# Patient Record
Sex: MALE | Race: WHITE | NOT HISPANIC OR LATINO | URBAN - METROPOLITAN AREA
[De-identification: names, ages, dates, MRNs, and addresses within clinical notes are randomized per-mention and may not be internally consistent; named-entity substitution may affect disease eponyms.]

---

## 2019-06-23 ENCOUNTER — INPATIENT HOSPITAL (OUTPATIENT)
Dept: URBAN - METROPOLITAN AREA HOSPITAL 90 | Facility: HOSPITAL | Age: 69
End: 2019-06-23

## 2019-06-23 DIAGNOSIS — F10.10 ALCOHOL ABUSE, UNCOMPLICATED: ICD-10-CM

## 2019-06-23 DIAGNOSIS — K85.20 ALCOHOL INDUCED ACUTE PANCREATITIS WITHOUT NECROSIS OR INFEC: ICD-10-CM

## 2019-06-23 DIAGNOSIS — R11.2 NAUSEA WITH VOMITING, UNSPECIFIED: ICD-10-CM

## 2019-06-23 DIAGNOSIS — K70.10 ALCOHOLIC HEPATITIS WITHOUT ASCITES: ICD-10-CM

## 2019-06-23 PROCEDURE — 99223 1ST HOSP IP/OBS HIGH 75: CPT

## 2019-06-24 ENCOUNTER — INPATIENT HOSPITAL (OUTPATIENT)
Dept: URBAN - METROPOLITAN AREA HOSPITAL 90 | Facility: HOSPITAL | Age: 69
End: 2019-06-24

## 2019-06-24 DIAGNOSIS — F10.10 ALCOHOL ABUSE, UNCOMPLICATED: ICD-10-CM

## 2019-06-24 DIAGNOSIS — R93.3 ABNORMAL FINDINGS ON DIAGNOSTIC IMAGING OF OTHER PARTS OF DI: ICD-10-CM

## 2019-06-24 DIAGNOSIS — R10.13 EPIGASTRIC PAIN: ICD-10-CM

## 2019-06-24 DIAGNOSIS — K22.70 BARRETT'S ESOPHAGUS WITHOUT DYSPLASIA: ICD-10-CM

## 2019-06-24 DIAGNOSIS — R11.2 NAUSEA WITH VOMITING, UNSPECIFIED: ICD-10-CM

## 2019-06-24 DIAGNOSIS — K31.7 POLYP OF STOMACH AND DUODENUM: ICD-10-CM

## 2019-06-24 DIAGNOSIS — K70.10 ALCOHOLIC HEPATITIS WITHOUT ASCITES: ICD-10-CM

## 2019-06-24 DIAGNOSIS — K85.20 ALCOHOL INDUCED ACUTE PANCREATITIS WITHOUT NECROSIS OR INFEC: ICD-10-CM

## 2019-06-24 PROCEDURE — 43239 EGD BIOPSY SINGLE/MULTIPLE: CPT

## 2019-07-30 ENCOUNTER — OFFICE VISIT (OUTPATIENT)
Dept: FAMILY MEDICINE CLINIC | Facility: CLINIC | Age: 69
End: 2019-07-30

## 2019-07-30 VITALS
DIASTOLIC BLOOD PRESSURE: 83 MMHG | SYSTOLIC BLOOD PRESSURE: 128 MMHG | HEIGHT: 61 IN | BODY MASS INDEX: 46.07 KG/M2 | OXYGEN SATURATION: 95 % | WEIGHT: 244 LBS | HEART RATE: 72 BPM | TEMPERATURE: 98.3 F

## 2019-07-30 DIAGNOSIS — I10 ESSENTIAL HYPERTENSION: ICD-10-CM

## 2019-07-30 DIAGNOSIS — R79.89 ELEVATED LIVER FUNCTION TESTS: ICD-10-CM

## 2019-07-30 DIAGNOSIS — N40.1 BENIGN PROSTATIC HYPERPLASIA WITH URINARY FREQUENCY: ICD-10-CM

## 2019-07-30 DIAGNOSIS — K22.70 BARRETT'S ESOPHAGUS WITHOUT DYSPLASIA: Primary | ICD-10-CM

## 2019-07-30 DIAGNOSIS — R79.9 ABNORMAL FINDING OF BLOOD CHEMISTRY: ICD-10-CM

## 2019-07-30 DIAGNOSIS — R35.0 BENIGN PROSTATIC HYPERPLASIA WITH URINARY FREQUENCY: ICD-10-CM

## 2019-07-30 DIAGNOSIS — F51.01 PRIMARY INSOMNIA: ICD-10-CM

## 2019-07-30 DIAGNOSIS — Z11.59 ENCOUNTER FOR SCREENING FOR OTHER VIRAL DISEASES: ICD-10-CM

## 2019-07-30 DIAGNOSIS — Z28.39 IMMUNIZATION DEFICIENCY: ICD-10-CM

## 2019-07-30 PROBLEM — M17.0 OSTEOARTHRITIS OF BOTH KNEES: Status: ACTIVE | Noted: 2019-07-30

## 2019-07-30 PROBLEM — R11.2 NON-INTRACTABLE VOMITING WITH NAUSEA: Status: ACTIVE | Noted: 2019-07-30

## 2019-07-30 PROBLEM — J43.8 OTHER EMPHYSEMA: Status: ACTIVE | Noted: 2019-07-30

## 2019-07-30 PROBLEM — F33.41 RECURRENT MAJOR DEPRESSIVE DISORDER, IN PARTIAL REMISSION (HCC): Status: ACTIVE | Noted: 2019-07-30

## 2019-07-30 PROBLEM — J30.1 SEASONAL ALLERGIC RHINITIS DUE TO POLLEN: Status: ACTIVE | Noted: 2019-07-30

## 2019-07-30 PROBLEM — K29.50 ANTRAL GASTRITIS: Status: ACTIVE | Noted: 2019-07-30

## 2019-07-30 PROBLEM — M1A.09X0 CHRONIC GOUT OF MULTIPLE SITES: Status: ACTIVE | Noted: 2019-07-30

## 2019-07-30 PROCEDURE — G0009 ADMIN PNEUMOCOCCAL VACCINE: HCPCS | Performed by: FAMILY MEDICINE

## 2019-07-30 PROCEDURE — 99214 OFFICE O/P EST MOD 30 MIN: CPT | Performed by: FAMILY MEDICINE

## 2019-07-30 PROCEDURE — 90732 PPSV23 VACC 2 YRS+ SUBQ/IM: CPT | Performed by: FAMILY MEDICINE

## 2019-07-30 RX ORDER — AMLODIPINE BESYLATE AND BENAZEPRIL HYDROCHLORIDE 5; 40 MG/1; MG/1
1 CAPSULE ORAL DAILY
Refills: 3 | COMMUNITY
Start: 2019-06-27 | End: 2019-11-24 | Stop reason: SDUPTHER

## 2019-07-30 RX ORDER — CLONAZEPAM 0.5 MG/1
0.5 TABLET ORAL DAILY
COMMUNITY
End: 2020-03-03 | Stop reason: SDUPTHER

## 2019-07-30 RX ORDER — PANTOPRAZOLE SODIUM 40 MG/1
40 TABLET, DELAYED RELEASE ORAL 2 TIMES DAILY
Refills: 11 | COMMUNITY
Start: 2019-06-20 | End: 2020-02-19

## 2019-07-30 RX ORDER — FLUTICASONE PROPIONATE 50 MCG
1 SPRAY, SUSPENSION (ML) NASAL DAILY
COMMUNITY
Start: 2019-05-12 | End: 2020-09-03 | Stop reason: SDUPTHER

## 2019-07-30 RX ORDER — AZELASTINE 1 MG/ML
2 SPRAY, METERED NASAL 2 TIMES DAILY
COMMUNITY
End: 2020-09-03

## 2019-07-30 RX ORDER — METOPROLOL TARTRATE 50 MG/1
1 TABLET, FILM COATED ORAL 2 TIMES DAILY
COMMUNITY
Start: 2019-07-27 | End: 2019-10-23 | Stop reason: SDUPTHER

## 2019-07-30 NOTE — PROGRESS NOTES
Chief Complaint   Patient presents with   • Anxiety   • Hypertension   • Heartburn       Subjective   Álvaro Jacques is a 69 y.o. male.     Pt had episode about a month ago where he was unable to keep anything down, vomiting but no diarrhea. Was admitted to [place]       Anxiety   Patient reports no chest pain, confusion, depressed mood or shortness of breath.       Hypertension   Associated symptoms include anxiety. Pertinent negatives include no blurred vision, chest pain or shortness of breath.   Heartburn   He reports no abdominal pain, no chest pain, no sore throat or no wheezing. Pertinent negatives include no fatigue.       F/U ALLA with Coronel's.  Doing well with meds.  No SE.   F/U HTN.  No orthostasis.  On meds regularly.    F/U nausea for 3 days.  Admitted to [facility] 6/22/19.  No real answer for problem.  Still with occ yellowing of eyes.   I am drinking about 2 drinks 3 times a week or so.      The following portions of the patient's history were reviewed and updated as appropriate: allergies, current medications, past family history, past medical history, past social history, past surgical history and problem list.    Review of Systems   Constitutional: Negative for appetite change and fatigue.   HENT: Negative for nosebleeds and sore throat.    Eyes: Negative for blurred vision and visual disturbance.   Respiratory: Negative for shortness of breath and wheezing.    Cardiovascular: Negative for chest pain and leg swelling.   Gastrointestinal: Negative for abdominal distention and abdominal pain.   Endocrine: Negative for cold intolerance and polyuria.   Genitourinary: Negative for dysuria and hematuria.   Musculoskeletal: Negative for arthralgias and myalgias.   Skin: Negative for color change and rash.   Neurological: Negative for weakness and confusion.   Psychiatric/Behavioral: Negative for agitation and depressed mood.       Patient Active Problem List   Diagnosis   • Seasonal allergic rhinitis  due to pollen   • Antral gastritis   • Coronel's esophagus without dysplasia   • Recurrent major depressive disorder, in partial remission (CMS/HCC)   • Elevated liver function tests   • Other emphysema (CMS/HCC)   • Essential hypertension   • Chronic gout of multiple sites   • Osteoarthritis of both knees   • Primary insomnia   • Benign prostatic hyperplasia with urinary frequency   • Immunization deficiency   • Non-intractable vomiting with nausea       No Known Allergies      Current Outpatient Medications:   •  amLODIPine-benazepril (LOTREL) 5-40 MG per capsule, Take 1 capsule by mouth Daily., Disp: , Rfl: 3  •  azelastine (ASTELIN) 0.1 % nasal spray, 2 sprays into the nostril(s) as directed by provider 2 (Two) Times a Day. Use in each nostril as directed, Disp: , Rfl:   •  clonazePAM (KlonoPIN) 0.5 MG tablet, Take 0.5 mg by mouth Daily., Disp: , Rfl:   •  fluticasone (FLONASE) 50 MCG/ACT nasal spray, 1 spray into the nostril(s) as directed by provider Daily., Disp: , Rfl:   •  metoprolol tartrate (LOPRESSOR) 50 MG tablet, Take 1 tablet by mouth 2 (Two) Times a Day., Disp: , Rfl:   •  pantoprazole (PROTONIX) 40 MG EC tablet, Take 40 mg by mouth 2 (Two) Times a Day., Disp: , Rfl: 11    Past Medical History:   Diagnosis Date   • Allergic rhinitis    • Anxiety    • BPH (benign prostatic hyperplasia)    • Emphysema lung (CMS/HCC)    • GERD (gastroesophageal reflux disease)    • Gout    • Hypertension    • Insomnia    • Osteoporosis        Past Surgical History:   Procedure Laterality Date   • COLONOSCOPY         History reviewed. No pertinent family history.    Social History     Tobacco Use   • Smoking status: Former Smoker     Types: Cigarettes, Cigars     Last attempt to quit: 1970     Years since quittin.0   • Smokeless tobacco: Never Used   • Tobacco comment: still smokes cigars occasionally    Substance Use Topics   • Alcohol use: Yes            Objective     Vitals:    19 1340   BP: 128/83    Pulse: 72   Temp: 98.3 °F (36.8 °C)   SpO2: 95%     Body mass index is 46.1 kg/m².    Physical Exam   Constitutional: He is oriented to person, place, and time. He appears well-developed and well-nourished.   HENT:   Head: Normocephalic and atraumatic.   Right Ear: External ear normal.   Left Ear: External ear normal.   Nose: Nose normal.   Mouth/Throat: Oropharynx is clear and moist.   Eyes: EOM are normal. Pupils are equal, round, and reactive to light.   Questionable sl icteric in conjunctiva   Neck: Normal range of motion. Neck supple. No tracheal deviation present. No thyromegaly present.   Cardiovascular: Normal rate, regular rhythm and normal heart sounds. Exam reveals no gallop and no friction rub.   No murmur heard.  Pulmonary/Chest: Effort normal and breath sounds normal. No respiratory distress. He exhibits no tenderness.   Abdominal: Soft. Bowel sounds are normal. He exhibits no distension. There is no tenderness.   Musculoskeletal: Normal range of motion. He exhibits no edema or tenderness.   Lymphadenopathy:     He has no cervical adenopathy.   Neurological: He is alert and oriented to person, place, and time. He displays normal reflexes. No cranial nerve deficit or sensory deficit. Coordination normal.   Skin: Skin is warm and dry.   Psychiatric: He has a normal mood and affect. His behavior is normal. Judgment and thought content normal.   Nursing note and vitals reviewed.      No results found for: GLUCOSE, BUN, CREATININE, EGFRIFNONA, EGFRIFAFRI, BCR, K, CO2, CALCIUM, PROTENTOTREF, ALBUMIN, LABIL2, AST, ALT    No results found for: WBC, RBC, HGB, HCT, MCV, MCH, MCHC, RDW, RDWSD, MPV, PLT, NEUTRORELPCT, LYMPHORELPCT, MONORELPCT, EOSRELPCT, BASORELPCT, AUTOIGPER, NEUTROABS, LYMPHSABS, MONOSABS, EOSABS, BASOSABS, AUTOIGNUM, NRBC    No results found for: HGBA1C    No results found for: [id number]    No results found for: TSH    No results found for: CHOL  No results found for: TRIG  No results found  for: HDL  No results found for: LDL  No results found for: VLDL  No results found for: LDLHDL      Procedures    Assessment/Plan   Problems Addressed this Visit        Cardiovascular and Mediastinum    Essential hypertension    Relevant Orders    Lipid Panel With / Chol / HDL Ratio    TSH Rfx On Abnormal To Free T4       Digestive    Coronel's esophagus without dysplasia - Primary       Genitourinary    Benign prostatic hyperplasia with urinary frequency    Relevant Orders    PSA DIAGNOSTIC       Other    Elevated liver function tests    Relevant Orders    CBC & Differential    Comprehensive Metabolic Panel    Amylase    Lipase    Lipid Panel With / Chol / HDL Ratio    Hepatitis C Virus Ab Cascade    Hepatitis B Surface Antigen    Hepatitis B Surface Antibody    Ferritin    Iron    TSH Rfx On Abnormal To Free T4    ALEXA by IFA, Reflex 9-biomarkers profile    Ceruloplasmin    Hepatitis A Antibody, IgM    Primary insomnia    Immunization deficiency      Other Visit Diagnoses     Encounter for screening for other viral diseases         Relevant Orders    Hepatitis B Surface Antigen    Hepatitis B Surface Antibody    Abnormal finding of blood chemistry         Relevant Orders    Ferritin    Iron          Orders Placed This Encounter   Procedures   • Pneumococcal Polysaccharide Vaccine 23-Valent Greater Than or Equal To 1yo Subcutaneous / IM   • Comprehensive Metabolic Panel   • Amylase   • Lipase   • Lipid Panel With / Chol / HDL Ratio   • PSA DIAGNOSTIC   • Hepatitis C Virus Ab Cascade   • Hepatitis B Surface Antigen   • Hepatitis B Surface Antibody   • Ferritin   • Iron   • TSH Rfx On Abnormal To Free T4   • ALEXA by IFA, Reflex 9-biomarkers profile   • Ceruloplasmin   • Hepatitis A Antibody, IgM   • CBC & Differential     Order Specific Question:   Manual Differential     Answer:   No       Current Outpatient Medications   Medication Sig Dispense Refill   • amLODIPine-benazepril (LOTREL) 5-40 MG per capsule Take 1  capsule by mouth Daily.  3   • azelastine (ASTELIN) 0.1 % nasal spray 2 sprays into the nostril(s) as directed by provider 2 (Two) Times a Day. Use in each nostril as directed     • clonazePAM (KlonoPIN) 0.5 MG tablet Take 0.5 mg by mouth Daily.     • fluticasone (FLONASE) 50 MCG/ACT nasal spray 1 spray into the nostril(s) as directed by provider Daily.     • metoprolol tartrate (LOPRESSOR) 50 MG tablet Take 1 tablet by mouth 2 (Two) Times a Day.     • pantoprazole (PROTONIX) 40 MG EC tablet Take 40 mg by mouth 2 (Two) Times a Day.  11     No current facility-administered medications for this visit.        Álvaro Jacques had no medications administered during this visit.    No Follow-up on file.    There are no Patient Instructions on file for this visit.

## 2019-07-31 LAB
ALBUMIN SERPL-MCNC: 4.7 G/DL (ref 3.5–5.2)
ALBUMIN/GLOB SERPL: 1.7 G/DL
ALP SERPL-CCNC: 62 U/L (ref 39–117)
ALT SERPL-CCNC: 32 U/L (ref 1–41)
AMYLASE SERPL-CCNC: 113 U/L (ref 28–100)
ANA TITR SER IF: NEGATIVE {TITER}
AST SERPL-CCNC: 37 U/L (ref 1–40)
BASOPHILS # BLD AUTO: 0.07 10*3/MM3 (ref 0–0.2)
BASOPHILS NFR BLD AUTO: 1.2 % (ref 0–1.5)
BILIRUB SERPL-MCNC: 2 MG/DL (ref 0.2–1.2)
BUN SERPL-MCNC: 7 MG/DL (ref 8–23)
BUN/CREAT SERPL: 7.7 (ref 7–25)
CALCIUM SERPL-MCNC: 9.6 MG/DL (ref 8.6–10.5)
CERULOPLASMIN SERPL-MCNC: 20.7 MG/DL (ref 16–31)
CHLORIDE SERPL-SCNC: 99 MMOL/L (ref 98–107)
CHOLEST SERPL-MCNC: 149 MG/DL (ref 0–200)
CHOLEST/HDLC SERPL: 1.54 {RATIO}
CO2 SERPL-SCNC: 25.7 MMOL/L (ref 22–29)
CREAT SERPL-MCNC: 0.91 MG/DL (ref 0.76–1.27)
EOSINOPHIL # BLD AUTO: 0.08 10*3/MM3 (ref 0–0.4)
EOSINOPHIL NFR BLD AUTO: 1.4 % (ref 0.3–6.2)
ERYTHROCYTE [DISTWIDTH] IN BLOOD BY AUTOMATED COUNT: 15.2 % (ref 12.3–15.4)
FERRITIN SERPL-MCNC: 218 NG/ML (ref 30–400)
GLOBULIN SER CALC-MCNC: 2.8 GM/DL
GLUCOSE SERPL-MCNC: 94 MG/DL (ref 65–99)
HAV IGM SERPL QL IA: NEGATIVE
HBV SURFACE AB SER QL: NON REACTIVE
HBV SURFACE AG SERPL QL IA: NEGATIVE
HCT VFR BLD AUTO: 47.5 % (ref 37.5–51)
HCV AB S/CO SERPL IA: <0.1 S/CO RATIO (ref 0–0.9)
HCV AB SERPL QL IA: NORMAL
HDLC SERPL-MCNC: 97 MG/DL (ref 40–60)
HGB BLD-MCNC: 16 G/DL (ref 13–17.7)
IMM GRANULOCYTES # BLD AUTO: 0.02 10*3/MM3 (ref 0–0.05)
IMM GRANULOCYTES NFR BLD AUTO: 0.3 % (ref 0–0.5)
IRON SERPL-MCNC: 181 MCG/DL (ref 59–158)
LABORATORY COMMENT REPORT: NORMAL
LDLC SERPL CALC-MCNC: 37 MG/DL (ref 0–100)
LIPASE SERPL-CCNC: 45 U/L (ref 13–60)
LYMPHOCYTES # BLD AUTO: 0.92 10*3/MM3 (ref 0.7–3.1)
LYMPHOCYTES NFR BLD AUTO: 15.8 % (ref 19.6–45.3)
MCH RBC QN AUTO: 30.7 PG (ref 26.6–33)
MCHC RBC AUTO-ENTMCNC: 33.7 G/DL (ref 31.5–35.7)
MCV RBC AUTO: 91 FL (ref 79–97)
MONOCYTES # BLD AUTO: 0.43 10*3/MM3 (ref 0.1–0.9)
MONOCYTES NFR BLD AUTO: 7.4 % (ref 5–12)
NEUTROPHILS # BLD AUTO: 4.32 10*3/MM3 (ref 1.7–7)
NEUTROPHILS NFR BLD AUTO: 73.9 % (ref 42.7–76)
NRBC BLD AUTO-RTO: 0 /100 WBC (ref 0–0.2)
PLATELET # BLD AUTO: 198 10*3/MM3 (ref 140–450)
POTASSIUM SERPL-SCNC: 3.9 MMOL/L (ref 3.5–5.2)
PROT SERPL-MCNC: 7.5 G/DL (ref 6–8.5)
PSA SERPL-MCNC: 3.78 NG/ML (ref 0–4)
RBC # BLD AUTO: 5.22 10*6/MM3 (ref 4.14–5.8)
SODIUM SERPL-SCNC: 138 MMOL/L (ref 136–145)
TRIGL SERPL-MCNC: 74 MG/DL (ref 0–150)
TSH SERPL DL<=0.005 MIU/L-ACNC: 2.85 MIU/ML (ref 0.27–4.2)
VLDLC SERPL CALC-MCNC: 14.8 MG/DL
WBC # BLD AUTO: 5.84 10*3/MM3 (ref 3.4–10.8)

## 2019-10-23 RX ORDER — METOPROLOL TARTRATE 50 MG/1
TABLET, FILM COATED ORAL
Qty: 180 TABLET | Refills: 0 | Status: SHIPPED | OUTPATIENT
Start: 2019-10-23 | End: 2020-02-20 | Stop reason: SDUPTHER

## 2019-10-31 ENCOUNTER — OFFICE VISIT (OUTPATIENT)
Dept: FAMILY MEDICINE CLINIC | Facility: CLINIC | Age: 69
End: 2019-10-31

## 2019-10-31 VITALS
TEMPERATURE: 98.5 F | BODY MASS INDEX: 47.58 KG/M2 | DIASTOLIC BLOOD PRESSURE: 70 MMHG | WEIGHT: 252 LBS | HEIGHT: 61 IN | OXYGEN SATURATION: 95 % | SYSTOLIC BLOOD PRESSURE: 128 MMHG | HEART RATE: 74 BPM

## 2019-10-31 DIAGNOSIS — F41.1 GAD (GENERALIZED ANXIETY DISORDER): ICD-10-CM

## 2019-10-31 DIAGNOSIS — R74.8 HYPERAMYLASEMIA: ICD-10-CM

## 2019-10-31 DIAGNOSIS — I10 ESSENTIAL HYPERTENSION: ICD-10-CM

## 2019-10-31 DIAGNOSIS — Z00.00 MEDICARE ANNUAL WELLNESS VISIT, SUBSEQUENT: Primary | ICD-10-CM

## 2019-10-31 PROCEDURE — 99214 OFFICE O/P EST MOD 30 MIN: CPT | Performed by: FAMILY MEDICINE

## 2019-10-31 PROCEDURE — G0439 PPPS, SUBSEQ VISIT: HCPCS | Performed by: FAMILY MEDICINE

## 2019-10-31 NOTE — PROGRESS NOTES
The ABCs of the Annual Wellness Visit  Subsequent Medicare Wellness Visit    Chief Complaint   Patient presents with   • Hypertension   • Anxiety   • Heartburn       Subjective   History of Present Illness:  Álvaro Jacques is a 69 y.o. male who presents for a Subsequent Medicare Wellness Visit.  F/U elevated amylase.  Amylase 113 from 3 months ago.  Drinking about 2 ounce of bourbon 5 times a week.  No nausea.  Doing well now.  Lipase normal.    FU HTN.  No orthostasis.  Doing well with meds.  I am exercising regularly now.     FU ELEAZAR.  Doing well with clonazepam prn and rarely given.    HEALTH RISK ASSESSMENT    Recent Hospitalizations:  Recently treated at the following:  Other: [facility]    Current Medical Providers:  Patient Care Team:  Corey Martínez MD as PCP - General (Family Medicine)    Smoking Status:  Social History     Tobacco Use   Smoking Status Former Smoker   • Types: Cigarettes, Cigars   • Last attempt to quit: 1970   • Years since quittin.3   Smokeless Tobacco Never Used   Tobacco Comment    still smokes cigars occasionally        Alcohol Consumption:  Social History     Substance and Sexual Activity   Alcohol Use Yes       Depression Screen:   PHQ-2/PHQ-9 Depression Screening 2019   Little interest or pleasure in doing things 0   Feeling down, depressed, or hopeless 0   Total Score 0       Fall Risk Screen:  STEADI Fall Risk Assessment has not been completed.    Health Habits and Functional and Cognitive Screening:  Functional & Cognitive Status 10/31/2019   Do you have difficulty preparing food and eating? No   Do you have difficulty bathing yourself, getting dressed or grooming yourself? No   Do you have difficulty using the toilet? No   Do you have difficulty moving around from place to place? No   Do you have trouble with steps or getting out of a bed or a chair? No   Current Diet Well Balanced Diet   Dental Exam Up to date   Eye Exam Not up to date   Exercise (times per  week) 4 times per week   Current Exercise Activities Include Walking   Do you need help using the phone?  No   Are you deaf or do you have serious difficulty hearing?  No   Do you need help with transportation? No   Do you need help shopping? No   Do you need help preparing meals?  No   Do you need help with housework?  No   Do you need help with laundry? No   Do you need help taking your medications? No   Do you need help managing money? No   Do you ever drive or ride in a car without wearing a seat belt? No   Have you felt unusual stress, anger or loneliness in the last month? No   Who do you live with? Spouse   If you need help, do you have trouble finding someone available to you? No   Have you been bothered in the last four weeks by sexual problems? No   Do you have difficulty concentrating, remembering or making decisions? No         Does the patient have evidence of cognitive impairment? No    Asprin use counseling:Does not need ASA (and currently is not on it)    Age-appropriate Screening Schedule:  Refer to the list below for future screening recommendations based on patient's age, sex and/or medical conditions. Orders for these recommended tests are listed in the plan section. The patient has been provided with a written plan.    Health Maintenance   Topic Date Due   • TDAP/TD VACCINES (1 - Tdap) 05/30/1971   • ZOSTER VACCINE (1 of 2) 05/30/2002   • DXA SCAN  07/30/2019   • COLONOSCOPY  06/24/2020   • INFLUENZA VACCINE  Completed   • PNEUMOCOCCAL VACCINES (65+ LOW/MEDIUM RISK)  Completed          The following portions of the patient's history were reviewed and updated as appropriate: allergies, current medications, past family history, past medical history, past social history, past surgical history and problem list.    Outpatient Medications Prior to Visit   Medication Sig Dispense Refill   • amLODIPine-benazepril (LOTREL) 5-40 MG per capsule Take 1 capsule by mouth Daily.  3   • azelastine (ASTELIN) 0.1  % nasal spray 2 sprays into the nostril(s) as directed by provider 2 (Two) Times a Day. Use in each nostril as directed     • clonazePAM (KlonoPIN) 0.5 MG tablet Take 0.5 mg by mouth Daily.     • fluticasone (FLONASE) 50 MCG/ACT nasal spray 1 spray into the nostril(s) as directed by provider Daily.     • metoprolol tartrate (LOPRESSOR) 50 MG tablet TAKE 1 TABLET BY MOUTH TWICE A  tablet 0   • pantoprazole (PROTONIX) 40 MG EC tablet Take 40 mg by mouth 2 (Two) Times a Day.  11     No facility-administered medications prior to visit.        Patient Active Problem List   Diagnosis   • Seasonal allergic rhinitis due to pollen   • Antral gastritis   • Coronel's esophagus without dysplasia   • Recurrent major depressive disorder, in partial remission (CMS/HCC)   • Elevated liver function tests   • Other emphysema (CMS/HCC)   • Essential hypertension   • Chronic gout of multiple sites   • Osteoarthritis of both knees   • Primary insomnia   • Benign prostatic hyperplasia with urinary frequency   • Immunization deficiency   • Non-intractable vomiting with nausea   • Medicare annual wellness visit, subsequent   • Hyperamylasemia   • ELEAZAR (generalized anxiety disorder)       Advanced Care Planning:  Patient does not have an advance directive - information provided to the patient today    Review of Systems   Constitutional: Negative for activity change, appetite change and fatigue.   HENT: Negative for hearing loss and postnasal drip.    Eyes: Negative for discharge and itching.   Respiratory: Negative for cough and shortness of breath.    Cardiovascular: Negative for chest pain and leg swelling.   Gastrointestinal: Negative for abdominal distention and abdominal pain.   Endocrine: Negative for cold intolerance and heat intolerance.   Genitourinary: Negative for difficulty urinating and flank pain.   Musculoskeletal: Negative for arthralgias and myalgias.   Skin: Negative for color change.   Neurological: Negative for  "dizziness and facial asymmetry.   Hematological: Negative for adenopathy.   Psychiatric/Behavioral: Negative for agitation and confusion.       Compared to one year ago, the patient feels his physical health is the same.  Compared to one year ago, the patient feels his mental health is the same.    Reviewed chart for potential of high risk medication in the elderly: yes  Reviewed chart for potential of harmful drug interactions in the elderly:yes    Objective         Vitals:    10/31/19 0851 10/31/19 0920   BP: 156/98 128/70   Pulse: 74    Temp: 98.5 °F (36.9 °C)    SpO2: 95%    Weight: 114 kg (252 lb)    Height: 154.9 cm (61\")        Body mass index is 47.61 kg/m².  Discussed the patient's BMI with him. The BMI is above average; BMI management plan is completed.    Physical Exam   Constitutional: He appears well-developed and well-nourished.   HENT:   Head: Normocephalic and atraumatic.   Right Ear: External ear normal.   Left Ear: External ear normal.   Nose: Nose normal.   Mouth/Throat: Oropharynx is clear and moist.   Eyes: Conjunctivae and EOM are normal. Pupils are equal, round, and reactive to light. Right eye exhibits no discharge. Left eye exhibits no discharge. No scleral icterus.   Neck: Normal range of motion. Neck supple. No JVD present. No tracheal deviation present. No thyromegaly present.   Cardiovascular: Normal rate, regular rhythm, normal heart sounds and intact distal pulses. Exam reveals no gallop and no friction rub.   No murmur heard.  Pulmonary/Chest: Effort normal and breath sounds normal. No respiratory distress. He has no wheezes. He has no rales. He exhibits no tenderness.   Abdominal: Soft. Bowel sounds are normal. He exhibits no distension and no mass. There is no tenderness. There is no rebound and no guarding. No hernia.   Musculoskeletal: Normal range of motion. He exhibits no edema or tenderness.   Lymphadenopathy:     He has no cervical adenopathy.   Neurological: He displays " normal reflexes. No cranial nerve deficit or sensory deficit. He exhibits normal muscle tone. Coordination normal.   Skin: Skin is warm and dry.   Psychiatric: He has a normal mood and affect. His behavior is normal. Judgment and thought content normal.   Nursing note and vitals reviewed.            Assessment/Plan   Medicare Risks and Personalized Health Plan  CMS Preventative Services Quick Reference  Inactivity/Sedentary    The above risks/problems have been discussed with the patient.  Pertinent information has been shared with the patient in the After Visit Summary.  Follow up plans and orders are seen below in the Assessment/Plan Section.    Diagnoses and all orders for this visit:    1. Medicare annual wellness visit, subsequent (Primary)    2. Essential hypertension    3. Hyperamylasemia  -     Amylase    4. ELEAZAR (generalized anxiety disorder)    Continue BP meds.  Continue clonazepam for anxiety.    Follow Up:  Return in about 4 months (around 2/29/2020).     An After Visit Summary and PPPS were given to the patient.     Get into seeing optometry.

## 2019-11-01 LAB — AMYLASE SERPL-CCNC: 71 U/L (ref 28–100)

## 2019-11-25 RX ORDER — AMLODIPINE BESYLATE AND BENAZEPRIL HYDROCHLORIDE 5; 40 MG/1; MG/1
CAPSULE ORAL
Qty: 30 CAPSULE | Refills: 5 | Status: SHIPPED | OUTPATIENT
Start: 2019-11-25 | End: 2020-03-03 | Stop reason: SDUPTHER

## 2020-02-19 RX ORDER — PANTOPRAZOLE SODIUM 40 MG/1
TABLET, DELAYED RELEASE ORAL
Qty: 180 TABLET | Refills: 3 | Status: SHIPPED | OUTPATIENT
Start: 2020-02-19 | End: 2021-01-05 | Stop reason: SDUPTHER

## 2020-02-20 ENCOUNTER — TELEPHONE (OUTPATIENT)
Dept: FAMILY MEDICINE CLINIC | Facility: CLINIC | Age: 70
End: 2020-02-20

## 2020-02-20 RX ORDER — METOPROLOL TARTRATE 50 MG/1
50 TABLET, FILM COATED ORAL 2 TIMES DAILY
Qty: 180 TABLET | Refills: 1 | Status: SHIPPED | OUTPATIENT
Start: 2020-02-20 | End: 2020-08-18

## 2020-02-20 NOTE — TELEPHONE ENCOUNTER
Pt is requesting a refill on the following medication,   metoprolol tartrate (LOPRESSOR) 50 MG tablet        Sig: TAKE 1 TABLET BY MOUTH TWICE A DAY

## 2020-03-03 ENCOUNTER — OFFICE VISIT (OUTPATIENT)
Dept: FAMILY MEDICINE CLINIC | Facility: CLINIC | Age: 70
End: 2020-03-03

## 2020-03-03 VITALS
WEIGHT: 252 LBS | BODY MASS INDEX: 47.58 KG/M2 | OXYGEN SATURATION: 95 % | SYSTOLIC BLOOD PRESSURE: 167 MMHG | TEMPERATURE: 98.2 F | DIASTOLIC BLOOD PRESSURE: 105 MMHG | HEIGHT: 61 IN | HEART RATE: 79 BPM

## 2020-03-03 DIAGNOSIS — R74.8 HYPERAMYLASEMIA: ICD-10-CM

## 2020-03-03 DIAGNOSIS — I10 ESSENTIAL HYPERTENSION: ICD-10-CM

## 2020-03-03 DIAGNOSIS — R79.89 ELEVATED LIVER FUNCTION TESTS: ICD-10-CM

## 2020-03-03 DIAGNOSIS — F41.1 GAD (GENERALIZED ANXIETY DISORDER): Primary | ICD-10-CM

## 2020-03-03 DIAGNOSIS — K22.70 BARRETT'S ESOPHAGUS WITHOUT DYSPLASIA: ICD-10-CM

## 2020-03-03 PROCEDURE — 99214 OFFICE O/P EST MOD 30 MIN: CPT | Performed by: FAMILY MEDICINE

## 2020-03-03 RX ORDER — AMLODIPINE BESYLATE AND BENAZEPRIL HYDROCHLORIDE 5; 40 MG/1; MG/1
1 CAPSULE ORAL DAILY
Qty: 90 CAPSULE | Refills: 3 | Status: SHIPPED | OUTPATIENT
Start: 2020-03-03 | End: 2020-08-25 | Stop reason: SDUPTHER

## 2020-03-03 RX ORDER — CLONAZEPAM 0.5 MG/1
TABLET ORAL
Qty: 15 TABLET | Refills: 3 | Status: SHIPPED | OUTPATIENT
Start: 2020-03-03 | End: 2021-10-19

## 2020-03-03 NOTE — PROGRESS NOTES
Chief Complaint   Patient presents with   • Hypertension   • Heartburn   • Anxiety       Subjective   Álvaro Jacques is a 69 y.o. male.     History of Present Illness   F/u HTN.  I have been without my BP meds for 3 weeks.    F/U ALLA with Coronel's esophagus.  I do well with BID PPI.  It is doing well.   C.o trouble with anxiety.  I use clonazepam prn only.  It does well for me for a few days at a time.  ANABELL appropriate with no refills seen.   C/o trouble with R knee pain.  Increased trouble with mobility.  Going to have surgery on R knee in a few weeks.     FU elevated liver tests.  I drink 4 to 6 ounces of bourbon a night.   About 4 times a week.    The following portions of the patient's history were reviewed and updated as appropriate: allergies, current medications, past family history, past medical history, past social history, past surgical history and problem list.    Review of Systems   Constitutional: Negative for appetite change and fatigue.   HENT: Negative for nosebleeds and sore throat.    Eyes: Negative for blurred vision and visual disturbance.   Respiratory: Negative for shortness of breath and wheezing.    Cardiovascular: Negative for chest pain and leg swelling.   Gastrointestinal: Negative for abdominal distention and abdominal pain.   Endocrine: Negative for cold intolerance and polyuria.   Genitourinary: Negative for dysuria and hematuria.   Musculoskeletal: Negative for arthralgias and myalgias.   Skin: Negative for color change and rash.   Neurological: Negative for weakness and confusion.   Psychiatric/Behavioral: Negative for agitation and depressed mood.       Patient Active Problem List   Diagnosis   • Seasonal allergic rhinitis due to pollen   • Antral gastritis   • Coronel's esophagus without dysplasia   • Recurrent major depressive disorder, in partial remission (CMS/HCC)   • Elevated liver function tests   • Other emphysema (CMS/HCC)   • Essential hypertension   • Chronic gout of  multiple sites   • Osteoarthritis of both knees   • Primary insomnia   • Benign prostatic hyperplasia with urinary frequency   • Immunization deficiency   • Non-intractable vomiting with nausea   • Medicare annual wellness visit, subsequent   • Hyperamylasemia   • ELEAZAR (generalized anxiety disorder)       No Known Allergies      Current Outpatient Medications:   •  amLODIPine-benazepril (LOTREL) 5-40 MG per capsule, Take 1 capsule by mouth Daily., Disp: 90 capsule, Rfl: 3  •  azelastine (ASTELIN) 0.1 % nasal spray, 2 sprays into the nostril(s) as directed by provider 2 (Two) Times a Day. Use in each nostril as directed, Disp: , Rfl:   •  clonazePAM (KlonoPIN) 0.5 MG tablet, One a day prn anxiety.  Not to exceed 15 per month., Disp: 15 tablet, Rfl: 3  •  fluticasone (FLONASE) 50 MCG/ACT nasal spray, 1 spray into the nostril(s) as directed by provider Daily., Disp: , Rfl:   •  metoprolol tartrate (LOPRESSOR) 50 MG tablet, Take 1 tablet by mouth 2 (Two) Times a Day., Disp: 180 tablet, Rfl: 1  •  pantoprazole (PROTONIX) 40 MG EC tablet, TAKE 1 TABLET TWICE A DAY, Disp: 180 tablet, Rfl: 3    Past Medical History:   Diagnosis Date   • Allergic rhinitis    • Antral gastritis    • Anxiety    • Coronel's esophagus    • Benign enlargement of prostate    • BPH (benign prostatic hyperplasia)    • Calcaneal spur    • Depression with anxiety    • Elevated liver function tests    • Emphysema lung (CMS/HCC)    • Flu vaccine need    • GERD (gastroesophageal reflux disease)    • Gout    • H/O echocardiogram    • History of allergy    • Hypertension    • Inability to attain erection    • Influenza    • Insomnia    • Knee osteoarthritis    • Medicare annual wellness visit, initial    • Nausea and vomiting    • Osteoarthritis    • Osteoporosis    • Primary insomnia    • Urinary frequency        Past Surgical History:   Procedure Laterality Date   • COLONOSCOPY  2010       Family History   Family history unknown: Yes       Social History      Tobacco Use   • Smoking status: Former Smoker     Types: Cigarettes, Cigars     Last attempt to quit: 1970     Years since quittin.6   • Smokeless tobacco: Never Used   • Tobacco comment: still smokes cigars occasionally    Substance Use Topics   • Alcohol use: Yes            Objective     Vitals:    20 0905   BP: (!) 167/105   Pulse: 79   Temp: 98.2 °F (36.8 °C)   SpO2: 95%     Body mass index is 47.61 kg/m².    Physical Exam   Constitutional: He appears well-developed and well-nourished.   HENT:   Head: Normocephalic and atraumatic.   Mouth/Throat: Oropharynx is clear and moist.   Eyes: Pupils are equal, round, and reactive to light. No scleral icterus.   Neck: No thyromegaly present.   Cardiovascular: Normal rate and regular rhythm. Exam reveals no gallop and no friction rub.   No murmur heard.  Pulmonary/Chest: Effort normal. No respiratory distress. He has no wheezes. He has no rales. He exhibits no tenderness.   Abdominal: Soft. Bowel sounds are normal. He exhibits no distension. There is no tenderness.   Musculoskeletal: Normal range of motion. He exhibits no edema or deformity.   Lymphadenopathy:     He has no cervical adenopathy.   Neurological: No cranial nerve deficit. He exhibits normal muscle tone.   Skin: Skin is warm and dry. No rash noted. He is not diaphoretic.   Vitals reviewed.      Lab Results   Component Value Date    BUN 7 (L) 2019    CREATININE 0.91 2019    EGFRIFNONA 83 2019    EGFRIFAFRI 101 2019    BCR 7.7 2019    K 3.9 2019    CO2 25.7 2019    CALCIUM 9.6 2019    PROTENTOTREF 7.5 2019    ALBUMIN 4.70 2019    LABIL2 1.7 2019    AST 37 2019    ALT 32 2019       WBC   Date Value Ref Range Status   2019 5.84 3.40 - 10.80 10*3/mm3 Final     RBC   Date Value Ref Range Status   2019 5.22 4.14 - 5.80 10*6/mm3 Final     Hemoglobin   Date Value Ref Range Status   2019 16.0 13.0 - 17.7  g/dL Final     Hematocrit   Date Value Ref Range Status   07/30/2019 47.5 37.5 - 51.0 % Final     MCV   Date Value Ref Range Status   07/30/2019 91.0 79.0 - 97.0 fL Final     MCH   Date Value Ref Range Status   07/30/2019 30.7 26.6 - 33.0 pg Final     MCHC   Date Value Ref Range Status   07/30/2019 33.7 31.5 - 35.7 g/dL Final     RDW   Date Value Ref Range Status   07/30/2019 15.2 12.3 - 15.4 % Final     Platelets   Date Value Ref Range Status   07/30/2019 198 140 - 450 10*3/mm3 Final     Neutrophil Rel %   Date Value Ref Range Status   07/30/2019 73.9 42.7 - 76.0 % Final     Lymphocyte Rel %   Date Value Ref Range Status   07/30/2019 15.8 (L) 19.6 - 45.3 % Final     Monocyte Rel %   Date Value Ref Range Status   07/30/2019 7.4 5.0 - 12.0 % Final     Eosinophil Rel %   Date Value Ref Range Status   07/30/2019 1.4 0.3 - 6.2 % Final     Basophil Rel %   Date Value Ref Range Status   07/30/2019 1.2 0.0 - 1.5 % Final     Neutrophils Absolute   Date Value Ref Range Status   07/30/2019 4.32 1.70 - 7.00 10*3/mm3 Final     Lymphocytes Absolute   Date Value Ref Range Status   07/30/2019 0.92 0.70 - 3.10 10*3/mm3 Final     Monocytes Absolute   Date Value Ref Range Status   07/30/2019 0.43 0.10 - 0.90 10*3/mm3 Final     Eosinophils Absolute   Date Value Ref Range Status   07/30/2019 0.08 0.00 - 0.40 10*3/mm3 Final     Basophils Absolute   Date Value Ref Range Status   07/30/2019 0.07 0.00 - 0.20 10*3/mm3 Final     nRBC   Date Value Ref Range Status   07/30/2019 0.0 0.0 - 0.2 /100 WBC Final       No results found for: HGBA1C    No results found for: [id number]    TSH   Date Value Ref Range Status   07/30/2019 2.850 0.270 - 4.200 mIU/mL Final       No results found for: CHOL  Lab Results   Component Value Date    TRIG 74 07/30/2019     Lab Results   Component Value Date    HDL 97 (H) 07/30/2019     Lab Results   Component Value Date    LDL 37 07/30/2019     Lab Results   Component Value Date    VLDL 14.8 07/30/2019     [facility]  [facility]: [facility]      Procedures    Assessment/Plan   Problems Addressed this Visit        Cardiovascular and Mediastinum    Essential hypertension    Relevant Medications    amLODIPine-benazepril (LOTREL) 5-40 MG per capsule       Digestive    Coronel's esophagus without dysplasia       Other    Elevated liver function tests    Relevant Orders    Comprehensive Metabolic Panel    Hyperamylasemia    Relevant Orders    Amylase    ELEAZAR (generalized anxiety disorder) - Primary    Relevant Medications    clonazePAM (KlonoPIN) 0.5 MG tablet      Continue PPI BID.    Keep ETOH down to 14 ounces of bourbon a week.    Orders Placed This Encounter   Procedures   • Amylase   • Comprehensive Metabolic Panel       Current Outpatient Medications   Medication Sig Dispense Refill   • amLODIPine-benazepril (LOTREL) 5-40 MG per capsule Take 1 capsule by mouth Daily. 90 capsule 3   • azelastine (ASTELIN) 0.1 % nasal spray 2 sprays into the nostril(s) as directed by provider 2 (Two) Times a Day. Use in each nostril as directed     • clonazePAM (KlonoPIN) 0.5 MG tablet One a day prn anxiety.  Not to exceed 15 per month. 15 tablet 3   • fluticasone (FLONASE) 50 MCG/ACT nasal spray 1 spray into the nostril(s) as directed by provider Daily.     • metoprolol tartrate (LOPRESSOR) 50 MG tablet Take 1 tablet by mouth 2 (Two) Times a Day. 180 tablet 1   • pantoprazole (PROTONIX) 40 MG EC tablet TAKE 1 TABLET TWICE A  tablet 3     No current facility-administered medications for this visit.        Álvaro Georgie had no medications administered during this visit.    Return in about 6 months (around 9/3/2020).    There are no Patient Instructions on file for this visit.

## 2020-03-04 LAB
ALBUMIN SERPL-MCNC: 4.8 G/DL (ref 3.5–5.2)
ALBUMIN/GLOB SERPL: 1.7 G/DL
ALP SERPL-CCNC: 65 U/L (ref 39–117)
ALT SERPL-CCNC: 54 U/L (ref 1–41)
AMYLASE SERPL-CCNC: 76 U/L (ref 28–100)
AST SERPL-CCNC: 62 U/L (ref 1–40)
BILIRUB SERPL-MCNC: 2.7 MG/DL (ref 0.2–1.2)
BUN SERPL-MCNC: 10 MG/DL (ref 8–23)
BUN/CREAT SERPL: 9.4 (ref 7–25)
CALCIUM SERPL-MCNC: 9.5 MG/DL (ref 8.6–10.5)
CHLORIDE SERPL-SCNC: 98 MMOL/L (ref 98–107)
CO2 SERPL-SCNC: 26.2 MMOL/L (ref 22–29)
CREAT SERPL-MCNC: 1.06 MG/DL (ref 0.76–1.27)
GLOBULIN SER CALC-MCNC: 2.8 GM/DL
GLUCOSE SERPL-MCNC: 115 MG/DL (ref 65–99)
POTASSIUM SERPL-SCNC: 4.4 MMOL/L (ref 3.5–5.2)
PROT SERPL-MCNC: 7.6 G/DL (ref 6–8.5)
SODIUM SERPL-SCNC: 139 MMOL/L (ref 136–145)

## 2020-03-04 NOTE — PROGRESS NOTES
We are seeing some damage to your liver from the alcohol.  Minimize alcohol in your diet to help your liver.  This will also help the slightly elevated sugar.  See me in 6 months to recheck your labs.  Thanks.

## 2020-04-27 ENCOUNTER — TELEPHONE (OUTPATIENT)
Dept: FAMILY MEDICINE CLINIC | Facility: CLINIC | Age: 70
End: 2020-04-27

## 2020-04-27 NOTE — TELEPHONE ENCOUNTER
In Care Everywhere are is an Cardio appointment on 3/16 with an ekg.  I will call and ask for it to be sent.

## 2020-04-27 NOTE — TELEPHONE ENCOUNTER
I do not see an EKG anywhere including Care everywhere.  Please get EKG and let patient know we are waiting to receive EKG in order to review it.

## 2020-04-27 NOTE — TELEPHONE ENCOUNTER
Patients wife called in stating that the patient was suppose to have knee surgery today, the anesthesiologist  doesn't like THE LOOK OF HIS EKG and is waiting on  to look at his records and approve of his EKG results, if approves, then he can have the surgery,  EGK was preformed at [facility] in [facility]. Doesn't know if  could get them, Office of surgery needs an approval letter  Faxed to them In order of surgery.    Surgery was scheduled for this morning and the request for  was sent over on Friday and haven't from them since    Needs EKG revewied and approved for knee surgery

## 2020-07-27 ENCOUNTER — TELEMEDICINE (OUTPATIENT)
Dept: FAMILY MEDICINE CLINIC | Facility: CLINIC | Age: 70
End: 2020-07-27

## 2020-07-27 DIAGNOSIS — J43.8 OTHER EMPHYSEMA (HCC): ICD-10-CM

## 2020-07-27 DIAGNOSIS — J40 BRONCHITIS: Primary | ICD-10-CM

## 2020-07-27 DIAGNOSIS — Z12.11 COLON CANCER SCREENING: ICD-10-CM

## 2020-07-27 PROCEDURE — 99213 OFFICE O/P EST LOW 20 MIN: CPT | Performed by: FAMILY MEDICINE

## 2020-07-27 RX ORDER — BENZONATATE 200 MG/1
200 CAPSULE ORAL 3 TIMES DAILY PRN
Qty: 30 CAPSULE | Refills: 0 | Status: SHIPPED | OUTPATIENT
Start: 2020-07-27 | End: 2020-09-03

## 2020-07-27 RX ORDER — AZITHROMYCIN 250 MG/1
TABLET, FILM COATED ORAL
Qty: 6 TABLET | Refills: 0 | Status: SHIPPED | OUTPATIENT
Start: 2020-07-27 | End: 2020-09-03

## 2020-07-27 RX ORDER — PREDNISONE 1 MG/1
TABLET ORAL
Qty: 21 TABLET | Refills: 0 | Status: SHIPPED | OUTPATIENT
Start: 2020-07-27 | End: 2020-09-03

## 2020-07-27 NOTE — PROGRESS NOTES
CC:cough    Subjective   Álvaro Jacques is a 70 y.o. male.     History of Present Illness   The patient presents today for follow-up via a video visit due to the COVID-19 pandemic for  PT has been having ongoing cough in the morning and over past few days has been all day long.  He has been having a lot of phlegm.  He has been using MN bid and helps some.  He has tried nasal spay in past and didn't help.  He is currently not on any inhalers bc didn't really help much.  He does have emphysema as well.  Some wheezing.  No fever or chills or loss or taste or smell.  He has been coughing up a lot of phlegm.  He has not been on steroids recently.  He has not seen pulm in the past          The following portions of the patient's history were reviewed and updated as appropriate: allergies, current medications, past family history, past medical history, past social history, past surgical history and problem list.    Review of Systems see above    Patient Active Problem List   Diagnosis   • Seasonal allergic rhinitis due to pollen   • Antral gastritis   • Coronel's esophagus without dysplasia   • Recurrent major depressive disorder, in partial remission (CMS/HCC)   • Elevated liver function tests   • Other emphysema (CMS/HCC)   • Essential hypertension   • Chronic gout of multiple sites   • Osteoarthritis of both knees   • Primary insomnia   • Benign prostatic hyperplasia with urinary frequency   • Immunization deficiency   • Non-intractable vomiting with nausea   • Medicare annual wellness visit, subsequent   • Hyperamylasemia   • ELEAZAR (generalized anxiety disorder)   • Bronchitis       No Known Allergies      Current Outpatient Medications:   •  amLODIPine-benazepril (LOTREL) 5-40 MG per capsule, Take 1 capsule by mouth Daily., Disp: 90 capsule, Rfl: 3  •  azelastine (ASTELIN) 0.1 % nasal spray, 2 sprays into the nostril(s) as directed by provider 2 (Two) Times a Day. Use in each nostril as directed, Disp: , Rfl:   •   azithromycin (Zithromax) 250 MG tablet, Take 2 tablets the first day, then 1 tablet daily for 4 days., Disp: 6 tablet, Rfl: 0  •  benzonatate (TESSALON) 200 MG capsule, Take 1 capsule by mouth 3 (Three) Times a Day As Needed for Cough., Disp: 30 capsule, Rfl: 0  •  clonazePAM (KlonoPIN) 0.5 MG tablet, One a day prn anxiety.  Not to exceed 15 per month., Disp: 15 tablet, Rfl: 3  •  fluticasone (FLONASE) 50 MCG/ACT nasal spray, 1 spray into the nostril(s) as directed by provider Daily., Disp: , Rfl:   •  metoprolol tartrate (LOPRESSOR) 50 MG tablet, Take 1 tablet by mouth 2 (Two) Times a Day., Disp: 180 tablet, Rfl: 1  •  pantoprazole (PROTONIX) 40 MG EC tablet, TAKE 1 TABLET TWICE A DAY, Disp: 180 tablet, Rfl: 3  •  predniSONE (DELTASONE) 5 MG tablet, 6 pills day 1 5 pills day 2 4 pills day 3 3 pills day 4 2 pills day 5 1 pill day 6, Disp: 21 tablet, Rfl: 0    Past Medical History:   Diagnosis Date   • Allergic rhinitis    • Antral gastritis    • Coronel's esophagus    • Calcaneal spur    • Depression with anxiety    • Elevated liver function tests    • Emphysema lung (CMS/HCC)    • Flu vaccine need    • GERD (gastroesophageal reflux disease)    • Gout    • H/O echocardiogram    • Inability to attain erection    • Influenza    • Knee osteoarthritis    • Osteoarthritis    • Osteoporosis    • Primary insomnia    • Urinary frequency        Past Surgical History:   Procedure Laterality Date   • COLONOSCOPY         Family History   Family history unknown: Yes       Social History     Tobacco Use   • Smoking status: Former Smoker     Types: Cigarettes, Cigars     Last attempt to quit: 1970     Years since quittin.0   • Smokeless tobacco: Never Used   • Tobacco comment: still smokes cigars occasionally    Substance Use Topics   • Alcohol use: Yes            Objective     There were no vitals taken for this visit. Video Visit    Physical Exam  NAD  AAOx3  No labored breathing.  EOMI   PERRLA      Lab Results    Component Value Date    BUN 9 03/16/2020    CREATININE 0.7 03/16/2020    EGFRIFNONA 70 03/03/2020    EGFRIFAFRI 84 03/03/2020    BCR 12.9 03/16/2020    K 4.1 03/16/2020    CO2 28 03/16/2020    CALCIUM 9.3 03/16/2020    PROTENTOTREF 7.6 03/03/2020    ALBUMIN 4.80 03/03/2020    LABIL2 1.7 03/03/2020    AST 62 (H) 03/03/2020    ALT 54 (H) 03/03/2020       WBC   Date Value Ref Range Status   03/16/2020 5.57 4.5 - 11.0 10*3/uL Final     RBC   Date Value Ref Range Status   03/16/2020 5.11 4.5 - 5.9 10*6/uL Final     Hemoglobin   Date Value Ref Range Status   03/16/2020 15.6 13.5 - 17.5 g/dL Final     Hematocrit   Date Value Ref Range Status   03/16/2020 44.8 41.0 - 53.0 % Final     MCV   Date Value Ref Range Status   03/16/2020 87.7 80.0 - 100.0 fL Final     MCH   Date Value Ref Range Status   03/16/2020 30.5 26.0 - 34.0 pg Final     MCHC   Date Value Ref Range Status   03/16/2020 34.8 31.0 - 37.0 g/dL Final     RDW   Date Value Ref Range Status   03/16/2020 15.4 12.0 - 16.8 % Final     MPV   Date Value Ref Range Status   03/16/2020 8.8 6.7 - 10.8 fL Final     Platelets   Date Value Ref Range Status   03/16/2020 164 140 - 440 10*3/uL Final     Neutrophil Rel %   Date Value Ref Range Status   03/16/2020 70.7 45 - 80 % Final     Lymphocyte Rel %   Date Value Ref Range Status   03/16/2020 17.2 15 - 50 % Final     Monocyte Rel %   Date Value Ref Range Status   03/16/2020 8.1 0 - 15 % Final     Eosinophil %   Date Value Ref Range Status   03/16/2020 2.9 0 - 7 % Final     Basophil Rel %   Date Value Ref Range Status   03/16/2020 0.7 0 - 2 % Final     Immature Grans %   Date Value Ref Range Status   03/16/2020 0.4 (H) 0 % Final     Neutrophils Absolute   Date Value Ref Range Status   03/16/2020 3.94 2.0 - 8.8 10*3/uL Final     Lymphocytes Absolute   Date Value Ref Range Status   03/16/2020 0.96 0.7 - 5.5 10*3/uL Final     Monocytes Absolute   Date Value Ref Range Status   03/16/2020 0.45 0.0 - 1.7 10*3/uL Final     Eosinophils  Absolute   Date Value Ref Range Status   03/16/2020 0.16 0.0 - 0.8 10*3/uL Final     Basophils Absolute   Date Value Ref Range Status   03/16/2020 0.04 0.0 - 0.2 10*3/uL Final     Immature Grans, Absolute   Date Value Ref Range Status   03/16/2020 0.02 <1 10*3/uL Final     nRBC   Date Value Ref Range Status   03/16/2020 0 0 /100(WBC) Final       No results found for: HGBA1C    No results found for: [id number]    TSH   Date Value Ref Range Status   07/30/2019 2.850 0.270 - 4.200 mIU/mL Final       No results found for: CHOL  Lab Results   Component Value Date    TRIG 74 07/30/2019     Lab Results   Component Value Date    HDL 97 (H) 07/30/2019     Lab Results   Component Value Date    LDL 37 07/30/2019     Lab Results   Component Value Date    VLDL 14.8 07/30/2019     No results found for: LDLHDL      Procedures    Assessment/Plan   Problems Addressed this Visit        Respiratory    Other emphysema (CMS/[facility])    Relevant Medications    azithromycin (Zithromax) 250 MG tablet    benzonatate (TESSALON) 200 MG capsule    predniSONE (DELTASONE) 5 MG tablet    Other Relevant Orders    Ambulatory Referral to Pulmonology    Bronchitis - Primary    Relevant Medications    azithromycin (Zithromax) 250 MG tablet    benzonatate (TESSALON) 200 MG capsule    predniSONE (DELTASONE) 5 MG tablet    Other Relevant Orders    Ambulatory Referral to Pulmonology      Other Visit Diagnoses     Colon cancer screening        Relevant Orders    Ambulatory Referral For Screening Colonoscopy          Orders Placed This Encounter   Procedures   • Ambulatory Referral For Screening Colonoscopy     Referral Priority:   Routine     Referral Type:   Diagnostic Medical     Referral Reason:   Specialty Services Required     Number of Visits Requested:   1   • Ambulatory Referral to Pulmonology     Referral Priority:   Routine     Referral Type:   Consultation     Referral Reason:   Specialty Services Required     Requested Specialty:   Pulmonary  Disease     Number of Visits Requested:   1       Current Outpatient Medications   Medication Sig Dispense Refill   • amLODIPine-benazepril (LOTREL) 5-40 MG per capsule Take 1 capsule by mouth Daily. 90 capsule 3   • azelastine (ASTELIN) 0.1 % nasal spray 2 sprays into the nostril(s) as directed by provider 2 (Two) Times a Day. Use in each nostril as directed     • azithromycin (Zithromax) 250 MG tablet Take 2 tablets the first day, then 1 tablet daily for 4 days. 6 tablet 0   • benzonatate (TESSALON) 200 MG capsule Take 1 capsule by mouth 3 (Three) Times a Day As Needed for Cough. 30 capsule 0   • clonazePAM (KlonoPIN) 0.5 MG tablet One a day prn anxiety.  Not to exceed 15 per month. 15 tablet 3   • fluticasone (FLONASE) 50 MCG/ACT nasal spray 1 spray into the nostril(s) as directed by provider Daily.     • metoprolol tartrate (LOPRESSOR) 50 MG tablet Take 1 tablet by mouth 2 (Two) Times a Day. 180 tablet 1   • pantoprazole (PROTONIX) 40 MG EC tablet TAKE 1 TABLET TWICE A  tablet 3   • predniSONE (DELTASONE) 5 MG tablet 6 pills day 1 5 pills day 2 4 pills day 3 3 pills day 4 2 pills day 5 1 pill day 6 21 tablet 0     No current facility-administered medications for this visit.        No follow-ups on file.    There are no Patient Instructions on file for this visit.  pulm referral, start meds and follow up if no better.       Time spent on visit:  12   minutes.  This patient has consented to a telehealth visit via video. The visit was scheduled as a video visit to comply with patient safety concerns in accordance with CDC recommendations.  All vitals recorded within this visit are reported by the patient.

## 2020-08-18 RX ORDER — METOPROLOL TARTRATE 50 MG/1
TABLET, FILM COATED ORAL
Qty: 180 TABLET | Refills: 1 | Status: SHIPPED | OUTPATIENT
Start: 2020-08-18 | End: 2021-01-05 | Stop reason: SDUPTHER

## 2020-08-25 RX ORDER — AMLODIPINE BESYLATE AND BENAZEPRIL HYDROCHLORIDE 5; 40 MG/1; MG/1
1 CAPSULE ORAL DAILY
Qty: 90 CAPSULE | Refills: 3 | Status: SHIPPED | OUTPATIENT
Start: 2020-08-25 | End: 2020-09-03

## 2020-09-03 ENCOUNTER — OFFICE VISIT (OUTPATIENT)
Dept: FAMILY MEDICINE CLINIC | Facility: CLINIC | Age: 70
End: 2020-09-03

## 2020-09-03 VITALS
OXYGEN SATURATION: 97 % | HEART RATE: 69 BPM | WEIGHT: 257.4 LBS | DIASTOLIC BLOOD PRESSURE: 84 MMHG | TEMPERATURE: 98.6 F | HEIGHT: 70 IN | BODY MASS INDEX: 36.85 KG/M2 | RESPIRATION RATE: 16 BRPM | SYSTOLIC BLOOD PRESSURE: 138 MMHG

## 2020-09-03 DIAGNOSIS — J30.1 SEASONAL ALLERGIC RHINITIS DUE TO POLLEN: ICD-10-CM

## 2020-09-03 DIAGNOSIS — I10 ESSENTIAL HYPERTENSION: ICD-10-CM

## 2020-09-03 DIAGNOSIS — J43.8 OTHER EMPHYSEMA (HCC): ICD-10-CM

## 2020-09-03 DIAGNOSIS — R05.3 CHRONIC COUGH: Primary | ICD-10-CM

## 2020-09-03 PROCEDURE — 99214 OFFICE O/P EST MOD 30 MIN: CPT | Performed by: FAMILY MEDICINE

## 2020-09-03 RX ORDER — AMLODIPINE BESYLATE 5 MG/1
5 TABLET ORAL DAILY
Qty: 90 TABLET | Refills: 1 | Status: SHIPPED | OUTPATIENT
Start: 2020-09-03 | End: 2021-01-05 | Stop reason: SDUPTHER

## 2020-09-03 RX ORDER — MONTELUKAST SODIUM 10 MG/1
10 TABLET ORAL NIGHTLY
Qty: 90 TABLET | Refills: 3 | Status: SHIPPED | OUTPATIENT
Start: 2020-09-03 | End: 2021-09-14

## 2020-09-03 RX ORDER — FLUTICASONE PROPIONATE 50 MCG
1 SPRAY, SUSPENSION (ML) NASAL DAILY
Qty: 16 G | Refills: 5 | Status: SHIPPED | OUTPATIENT
Start: 2020-09-03 | End: 2020-10-23 | Stop reason: SDUPTHER

## 2020-09-03 NOTE — PROGRESS NOTES
Chief Complaint   Patient presents with   • Cough     follow up  finished antibiotics  and steroid pack    symptoms returning  no fever       Subjective   Álvaro Jacques is a 70 y.o. male.     History of Present Illness   C/o cough for 5 weeks.  Increased trouble for last few weeks.  No better.  Tx with z pack and prednisone and tessalon perles.  I feel like I have a lot of drainage in am. Lot of phlem.  No fever.  No help with zyrtec/flonase separately without help.  Appt with pulmonology in October.  Hx of emphysema.  No increased shortness of breath.  Quit smoking 30 years ago.    fU ALLA.  Doing well with meds.  No Se.    F/U HTN.  No orthostasis.  Doing well wiht meds.    The following portions of the patient's history were reviewed and updated as appropriate: allergies, current medications, past family history, past medical history, past social history, past surgical history and problem list.    Review of Systems   Constitutional: Negative for appetite change and fatigue.   HENT: Negative for nosebleeds and sore throat.    Eyes: Negative for blurred vision and visual disturbance.   Respiratory: Negative for shortness of breath and wheezing.    Cardiovascular: Negative for chest pain and leg swelling.   Gastrointestinal: Negative for abdominal distention and abdominal pain.   Endocrine: Negative for cold intolerance and polyuria.   Genitourinary: Negative for dysuria and hematuria.   Musculoskeletal: Negative for arthralgias and myalgias.   Skin: Negative for color change and rash.   Neurological: Negative for weakness and confusion.   Psychiatric/Behavioral: Negative for agitation and depressed mood.       Patient Active Problem List   Diagnosis   • Seasonal allergic rhinitis due to pollen   • Antral gastritis   • Coronel's esophagus without dysplasia   • Recurrent major depressive disorder, in partial remission (CMS/HCC)   • Elevated liver function tests   • Other emphysema (CMS/HCC)   • Essential hypertension    • Chronic gout of multiple sites   • Osteoarthritis of both knees   • Primary insomnia   • Benign prostatic hyperplasia with urinary frequency   • Immunization deficiency   • Non-intractable vomiting with nausea   • Medicare annual wellness visit, subsequent   • Hyperamylasemia   • ELEAZAR (generalized anxiety disorder)   • Bronchitis   • Chronic cough       No Known Allergies      Current Outpatient Medications:   •  metoprolol tartrate (LOPRESSOR) 50 MG tablet, TAKE 1 TABLET BY MOUTH TWICE A DAY, Disp: 180 tablet, Rfl: 1  •  pantoprazole (PROTONIX) 40 MG EC tablet, TAKE 1 TABLET TWICE A DAY, Disp: 180 tablet, Rfl: 3  •  amLODIPine (NORVASC) 5 MG tablet, Take 1 tablet by mouth Daily., Disp: 90 tablet, Rfl: 1  •  clonazePAM (KlonoPIN) 0.5 MG tablet, One a day prn anxiety.  Not to exceed 15 per month., Disp: 15 tablet, Rfl: 3  •  fluticasone (FLONASE) 50 MCG/ACT nasal spray, 1 spray into the nostril(s) as directed by provider Daily., Disp: 16 g, Rfl: 5  •  montelukast (Singulair) 10 MG tablet, Take 1 tablet by mouth Every Night., Disp: 90 tablet, Rfl: 3    Past Medical History:   Diagnosis Date   • Allergic rhinitis    • Antral gastritis    • Coronel's esophagus    • Calcaneal spur    • Depression with anxiety    • Elevated liver function tests    • Emphysema lung (CMS/HCC)    • Flu vaccine need    • GERD (gastroesophageal reflux disease)    • Gout    • H/O echocardiogram    • Inability to attain erection    • Influenza    • Knee osteoarthritis    • Osteoarthritis    • Osteoporosis    • Primary insomnia    • Urinary frequency        Past Surgical History:   Procedure Laterality Date   • COLONOSCOPY         Family History   Problem Relation Age of Onset   • Heart disease Mother        Social History     Tobacco Use   • Smoking status: Former Smoker     Types: Cigarettes, Cigars     Last attempt to quit: 1970     Years since quittin.1   • Smokeless tobacco: Never Used   • Tobacco comment: still smokes  cigars occasionally    Substance Use Topics   • Alcohol use: Yes     Alcohol/week: 10.0 standard drinks     Types: 10 Shots of liquor per week     Comment: per week            Objective     Vitals:    09/03/20 0904   BP: 138/84   Pulse: 69   Resp: 16   Temp: 98.6 °F (37 °C)   SpO2: 97%     Body mass index is 36.93 kg/m².    Physical Exam   Constitutional: He appears well-developed and well-nourished.   HENT:   Head: Normocephalic and atraumatic.   Mouth/Throat: Oropharynx is clear and moist.   Eyes: Pupils are equal, round, and reactive to light. No scleral icterus.   Neck: No thyromegaly present.   Cardiovascular: Normal rate and regular rhythm. Exam reveals no gallop and no friction rub.   No murmur heard.  Pulmonary/Chest: Effort normal. No respiratory distress. He has no wheezes. He has no rales. He exhibits no tenderness.   Abdominal: Soft. Bowel sounds are normal. He exhibits no distension. There is no tenderness.   Musculoskeletal: Normal range of motion. He exhibits no edema or deformity.   Lymphadenopathy:     He has no cervical adenopathy.   Neurological: No cranial nerve deficit. He exhibits normal muscle tone.   Skin: Skin is warm and dry. No rash noted. He is not diaphoretic.   Vitals reviewed.      Lab Results   Component Value Date    BUN 9 03/16/2020    CREATININE 0.7 03/16/2020    EGFRIFNONA 70 03/03/2020    EGFRIFAFRI 84 03/03/2020    BCR 12.9 03/16/2020    K 4.1 03/16/2020    CO2 28 03/16/2020    CALCIUM 9.3 03/16/2020    PROTENTOTREF 7.6 03/03/2020    ALBUMIN 4.80 03/03/2020    LABIL2 1.7 03/03/2020    AST 62 (H) 03/03/2020    ALT 54 (H) 03/03/2020       WBC   Date Value Ref Range Status   03/16/2020 5.57 4.5 - 11.0 10*3/uL Final     RBC   Date Value Ref Range Status   03/16/2020 5.11 4.5 - 5.9 10*6/uL Final     Hemoglobin   Date Value Ref Range Status   03/16/2020 15.6 13.5 - 17.5 g/dL Final     Hematocrit   Date Value Ref Range Status   03/16/2020 44.8 41.0 - 53.0 % Final     MCV   Date Value  Ref Range Status   03/16/2020 87.7 80.0 - 100.0 fL Final     MCH   Date Value Ref Range Status   03/16/2020 30.5 26.0 - 34.0 pg Final     MCHC   Date Value Ref Range Status   03/16/2020 34.8 31.0 - 37.0 g/dL Final     RDW   Date Value Ref Range Status   03/16/2020 15.4 12.0 - 16.8 % Final     MPV   Date Value Ref Range Status   03/16/2020 8.8 6.7 - 10.8 fL Final     Platelets   Date Value Ref Range Status   03/16/2020 164 140 - 440 10*3/uL Final     Neutrophil Rel %   Date Value Ref Range Status   03/16/2020 70.7 45 - 80 % Final     Lymphocyte Rel %   Date Value Ref Range Status   03/16/2020 17.2 15 - 50 % Final     Monocyte Rel %   Date Value Ref Range Status   03/16/2020 8.1 0 - 15 % Final     Eosinophil %   Date Value Ref Range Status   03/16/2020 2.9 0 - 7 % Final     Basophil Rel %   Date Value Ref Range Status   03/16/2020 0.7 0 - 2 % Final     Immature Grans %   Date Value Ref Range Status   03/16/2020 0.4 (H) 0 % Final     Neutrophils Absolute   Date Value Ref Range Status   03/16/2020 3.94 2.0 - 8.8 10*3/uL Final     Lymphocytes Absolute   Date Value Ref Range Status   03/16/2020 0.96 0.7 - 5.5 10*3/uL Final     Monocytes Absolute   Date Value Ref Range Status   03/16/2020 0.45 0.0 - 1.7 10*3/uL Final     Eosinophils Absolute   Date Value Ref Range Status   03/16/2020 0.16 0.0 - 0.8 10*3/uL Final     Basophils Absolute   Date Value Ref Range Status   03/16/2020 0.04 0.0 - 0.2 10*3/uL Final     Immature Grans, Absolute   Date Value Ref Range Status   03/16/2020 0.02 <1 10*3/uL Final     nRBC   Date Value Ref Range Status   03/16/2020 0 0 /100(WBC) Final       No results found for: HGBA1C    No results found for: FBLQRMWQ23    TSH   Date Value Ref Range Status   07/30/2019 2.850 0.270 - 4.200 mIU/mL Final       No results found for: CHOL  Lab Results   Component Value Date    TRIG 74 07/30/2019     Lab Results   Component Value Date    HDL 97 (H) 07/30/2019     Lab Results   Component Value Date    LDL 37  07/30/2019     Lab Results   Component Value Date    VLDL 14.8 07/30/2019     No results found for: LDLHDL      Procedures    Assessment/Plan   Problems Addressed this Visit        Cardiovascular and Mediastinum    Essential hypertension    Relevant Medications    amLODIPine (NORVASC) 5 MG tablet       Respiratory    Seasonal allergic rhinitis due to pollen    Relevant Medications    amLODIPine (NORVASC) 5 MG tablet    fluticasone (FLONASE) 50 MCG/ACT nasal spray    montelukast (Singulair) 10 MG tablet    Other emphysema (CMS/HCC)    Relevant Medications    fluticasone (FLONASE) 50 MCG/ACT nasal spray    montelukast (Singulair) 10 MG tablet    Other Relevant Orders    CT Chest With Contrast    Chronic cough - Primary    Relevant Medications    amLODIPine (NORVASC) 5 MG tablet    fluticasone (FLONASE) 50 MCG/ACT nasal spray    montelukast (Singulair) 10 MG tablet    Other Relevant Orders    CT Chest With Contrast      Stop amlodipine/benazepril. Start amlodipine 5mg a day.    If no better, add inhaled corticosteroid / LABA combo next visit.      Orders Placed This Encounter   Procedures   • CT Chest With Contrast     Standing Status:   Future     Standing Expiration Date:   9/3/2021       Current Outpatient Medications   Medication Sig Dispense Refill   • metoprolol tartrate (LOPRESSOR) 50 MG tablet TAKE 1 TABLET BY MOUTH TWICE A  tablet 1   • pantoprazole (PROTONIX) 40 MG EC tablet TAKE 1 TABLET TWICE A  tablet 3   • amLODIPine (NORVASC) 5 MG tablet Take 1 tablet by mouth Daily. 90 tablet 1   • clonazePAM (KlonoPIN) 0.5 MG tablet One a day prn anxiety.  Not to exceed 15 per month. 15 tablet 3   • fluticasone (FLONASE) 50 MCG/ACT nasal spray 1 spray into the nostril(s) as directed by provider Daily. 16 g 5   • montelukast (Singulair) 10 MG tablet Take 1 tablet by mouth Every Night. 90 tablet 3     No current facility-administered medications for this visit.        Álvaro Jacques had no medications  administered during this visit.    No follow-ups on file.    There are no Patient Instructions on file for this visit.

## 2020-09-22 ENCOUNTER — HOSPITAL ENCOUNTER (OUTPATIENT)
Dept: CT IMAGING | Facility: HOSPITAL | Age: 70
Discharge: HOME | End: 2020-09-22
Admitting: FAMILY MEDICINE

## 2020-09-22 DIAGNOSIS — J43.8 OTHER EMPHYSEMA (HCC): ICD-10-CM

## 2020-09-22 DIAGNOSIS — R05.3 CHRONIC COUGH: ICD-10-CM

## 2020-09-22 LAB — CREAT BLDA-MCNC: 0.8 MG/DL (ref 0.6–1.3)

## 2020-09-22 PROCEDURE — 25010000002 IOPAMIDOL 61 % SOLUTION: Performed by: FAMILY MEDICINE

## 2020-09-22 PROCEDURE — 71260 CT THORAX DX C+: CPT

## 2020-09-22 PROCEDURE — 82565 ASSAY OF CREATININE: CPT

## 2020-09-22 RX ADMIN — IOPAMIDOL 85 ML: 612 INJECTION, SOLUTION INTRAVENOUS at 08:06

## 2020-09-22 NOTE — PROGRESS NOTES
No suspicious or concerning areas on CT scan.  Minimal emphysema seen and only treatment needed is staying away from smoke/inhalants.

## 2020-09-24 ENCOUNTER — PREP FOR SURGERY (OUTPATIENT)
Dept: OTHER | Facility: HOSPITAL | Age: 70
End: 2020-09-24

## 2020-09-24 DIAGNOSIS — K22.70 BARRETT'S ESOPHAGUS WITHOUT DYSPLASIA: ICD-10-CM

## 2020-09-24 DIAGNOSIS — Z12.11 COLON CANCER SCREENING: Primary | ICD-10-CM

## 2020-09-24 RX ORDER — SODIUM CHLORIDE, SODIUM LACTATE, POTASSIUM CHLORIDE, CALCIUM CHLORIDE 600; 310; 30; 20 MG/100ML; MG/100ML; MG/100ML; MG/100ML
30 INJECTION, SOLUTION INTRAVENOUS CONTINUOUS
Status: CANCELLED | OUTPATIENT
Start: 2020-12-17

## 2020-10-23 DIAGNOSIS — J30.1 SEASONAL ALLERGIC RHINITIS DUE TO POLLEN: ICD-10-CM

## 2020-10-23 DIAGNOSIS — R05.3 CHRONIC COUGH: ICD-10-CM

## 2020-10-26 RX ORDER — FLUTICASONE PROPIONATE 50 MCG
1 SPRAY, SUSPENSION (ML) NASAL DAILY
Qty: 16 G | Refills: 5 | Status: SHIPPED | OUTPATIENT
Start: 2020-10-26

## 2020-11-09 ENCOUNTER — TELEPHONE (OUTPATIENT)
Dept: GASTROENTEROLOGY | Facility: CLINIC | Age: 70
End: 2020-11-09

## 2020-11-10 ENCOUNTER — TELEPHONE (OUTPATIENT)
Dept: GASTROENTEROLOGY | Facility: CLINIC | Age: 70
End: 2020-11-10

## 2020-11-10 PROBLEM — Z12.11 COLON CANCER SCREENING: Status: ACTIVE | Noted: 2020-11-10

## 2020-11-23 ENCOUNTER — TRANSCRIBE ORDERS (OUTPATIENT)
Dept: SLEEP MEDICINE | Facility: HOSPITAL | Age: 70
End: 2020-11-23

## 2020-11-23 DIAGNOSIS — Z01.818 OTHER SPECIFIED PRE-OPERATIVE EXAMINATION: Primary | ICD-10-CM

## 2020-12-15 ENCOUNTER — LAB (OUTPATIENT)
Dept: LAB | Facility: HOSPITAL | Age: 70
End: 2020-12-15

## 2020-12-15 DIAGNOSIS — Z01.818 OTHER SPECIFIED PRE-OPERATIVE EXAMINATION: ICD-10-CM

## 2020-12-15 PROCEDURE — U0004 COV-19 TEST NON-CDC HGH THRU: HCPCS

## 2020-12-15 PROCEDURE — C9803 HOPD COVID-19 SPEC COLLECT: HCPCS

## 2020-12-16 LAB — SARS-COV-2 RNA RESP QL NAA+PROBE: NOT DETECTED

## 2020-12-17 ENCOUNTER — HOSPITAL ENCOUNTER (OUTPATIENT)
Facility: HOSPITAL | Age: 70
Setting detail: HOSPITAL OUTPATIENT SURGERY
Discharge: HOME | End: 2020-12-17
Attending: INTERNAL MEDICINE | Admitting: INTERNAL MEDICINE

## 2020-12-17 ENCOUNTER — ANESTHESIA EVENT (OUTPATIENT)
Dept: GASTROENTEROLOGY | Facility: HOSPITAL | Age: 70
End: 2020-12-17

## 2020-12-17 ENCOUNTER — ANESTHESIA (OUTPATIENT)
Dept: GASTROENTEROLOGY | Facility: HOSPITAL | Age: 70
End: 2020-12-17

## 2020-12-17 VITALS
HEART RATE: 73 BPM | OXYGEN SATURATION: 96 % | HEIGHT: 70 IN | BODY MASS INDEX: 37.08 KG/M2 | DIASTOLIC BLOOD PRESSURE: 89 MMHG | SYSTOLIC BLOOD PRESSURE: 130 MMHG | RESPIRATION RATE: 16 BRPM | WEIGHT: 259 LBS

## 2020-12-17 DIAGNOSIS — Z12.11 COLON CANCER SCREENING: ICD-10-CM

## 2020-12-17 DIAGNOSIS — K22.70 BARRETT'S ESOPHAGUS WITHOUT DYSPLASIA: ICD-10-CM

## 2020-12-17 PROCEDURE — 88305 TISSUE EXAM BY PATHOLOGIST: CPT | Performed by: INTERNAL MEDICINE

## 2020-12-17 PROCEDURE — 25010000002 PROPOFOL 10 MG/ML EMULSION: Performed by: ANESTHESIOLOGY

## 2020-12-17 PROCEDURE — 43239 EGD BIOPSY SINGLE/MULTIPLE: CPT | Performed by: INTERNAL MEDICINE

## 2020-12-17 PROCEDURE — 45385 COLONOSCOPY W/LESION REMOVAL: CPT | Performed by: INTERNAL MEDICINE

## 2020-12-17 RX ORDER — LIDOCAINE HYDROCHLORIDE 20 MG/ML
INJECTION, SOLUTION INFILTRATION; PERINEURAL AS NEEDED
Status: DISCONTINUED | OUTPATIENT
Start: 2020-12-17 | End: 2020-12-17 | Stop reason: SURG

## 2020-12-17 RX ORDER — PROPOFOL 10 MG/ML
VIAL (ML) INTRAVENOUS CONTINUOUS PRN
Status: DISCONTINUED | OUTPATIENT
Start: 2020-12-17 | End: 2020-12-17 | Stop reason: SURG

## 2020-12-17 RX ORDER — PROPOFOL 10 MG/ML
VIAL (ML) INTRAVENOUS AS NEEDED
Status: DISCONTINUED | OUTPATIENT
Start: 2020-12-17 | End: 2020-12-17 | Stop reason: SURG

## 2020-12-17 RX ORDER — SODIUM CHLORIDE, SODIUM LACTATE, POTASSIUM CHLORIDE, CALCIUM CHLORIDE 600; 310; 30; 20 MG/100ML; MG/100ML; MG/100ML; MG/100ML
30 INJECTION, SOLUTION INTRAVENOUS CONTINUOUS
Status: DISCONTINUED | OUTPATIENT
Start: 2020-12-17 | End: 2020-12-17 | Stop reason: HOSPADM

## 2020-12-17 RX ADMIN — PROPOFOL 140 MG: 10 INJECTION, EMULSION INTRAVENOUS at 08:27

## 2020-12-17 RX ADMIN — SODIUM CHLORIDE, POTASSIUM CHLORIDE, SODIUM LACTATE AND CALCIUM CHLORIDE 30 ML/HR: 600; 310; 30; 20 INJECTION, SOLUTION INTRAVENOUS at 07:45

## 2020-12-17 RX ADMIN — PROPOFOL 140 MCG/KG/MIN: 10 INJECTION, EMULSION INTRAVENOUS at 08:28

## 2020-12-17 RX ADMIN — LIDOCAINE HYDROCHLORIDE 30 MG: 20 INJECTION, SOLUTION INFILTRATION; PERINEURAL at 08:26

## 2020-12-17 NOTE — ANESTHESIA POSTPROCEDURE EVALUATION
"Patient: Álvaro Jacques    Procedure Summary     Date: 12/17/20 Room / Location:  [facility] /  [facility]    Anesthesia Start: 0826 Anesthesia Stop: 0918    Procedures:       ESOPHAGOGASTRODUODENOSCOPY WITH COLD BIOPSIES (N/A Esophagus)      COLONOSCOPY TO CECUM WITH COLD AND HOT SNARE POLYPECTOMIES AND COLD BIOPSY POLYPECTOMY (N/A ) Diagnosis:       Colon cancer screening      Coronel's esophagus without dysplasia      (Colon cancer screening [Z12.11])      (Coronel's esophagus without dysplasia [K22.70])    Surgeon: Vincent Man MD Provider: Rena Mckeon MD    Anesthesia Type: MAC ASA Status: 3          Anesthesia Type: MAC    Vitals  Vitals Value Taken Time   /88 12/17/20 0924   Temp     Pulse 80 12/17/20 0924   Resp 16 12/17/20 0924   SpO2 96 % 12/17/20 0924           Post Anesthesia Care and Evaluation    Patient location during evaluation: PHASE II  Patient participation: complete - patient participated  Level of consciousness: awake and alert  Pain management: adequate  Airway patency: patent  Anesthetic complications: No anesthetic complications    Cardiovascular status: acceptable  Respiratory status: acceptable  Hydration status: acceptable    Comments: /88 (BP Location: Left arm, Patient Position: Lying)   Pulse 80   Resp 16   Ht 177.8 cm (70\")   Wt 117 kg (259 lb)   SpO2 96%   BMI 37.16 kg/m²         "

## 2020-12-17 NOTE — ANESTHESIA PREPROCEDURE EVALUATION
Anesthesia Evaluation     no history of anesthetic complications:               Airway   Mallampati: I  TM distance: >3 FB  Neck ROM: full  Dental      Comment: Small chips on upper front teeth    Pulmonary    (+) a smoker Former, COPD,   (-) shortness of breath, recent URI, sleep apnea  Cardiovascular     (+) hypertension,   (-) dysrhythmias, angina, hyperlipidemia      Neuro/Psych  (+) psychiatric history Anxiety and Depression,     GI/Hepatic/Renal/Endo    (+) obesity,  GERD,  liver disease history of elevated LFT,   (-) diabetes    Musculoskeletal     Abdominal    Substance History      OB/GYN          Other                      Anesthesia Plan    ASA 3     MAC     intravenous induction     Anesthetic plan, all risks, benefits, and alternatives have been provided, discussed and informed consent has been obtained with: patient.

## 2020-12-17 NOTE — H&P
[facility]  Pre Procedure History & Physical    Chief Complaint:   GERD  Time for my colonoscopy    Subjective     HPI:   70 y.o. male here for screening colonoscopy.  Will also perform EGD for long standing GERD    Past Medical History:   Past Medical History:   Diagnosis Date   • Allergic rhinitis    • Antral gastritis    • Coronel's esophagus    • Calcaneal spur    • Depression with anxiety    • Elevated liver function tests    • Emphysema lung (CMS/HCC)    • GERD (gastroesophageal reflux disease)    • Gout    • Hypertension    • Inability to attain erection    • Influenza    • Knee osteoarthritis    • Osteoarthritis    • Osteoporosis    • Primary insomnia    • Urinary frequency        Family History:  Family History   Problem Relation Age of Onset   • Heart disease Mother    • Malig Hyperthermia Neg Hx        Social History:   reports that he quit smoking about 50 years ago. His smoking use included cigarettes and cigars. He has never used smokeless tobacco. He reports current alcohol use of about 10.0 standard drinks of alcohol per week. He reports that he does not use drugs.    Medications:   Medications Prior to Admission   Medication Sig Dispense Refill Last Dose   • amLODIPine (NORVASC) 5 MG tablet Take 1 tablet by mouth Daily. 90 tablet 1 12/17/2020 at Unknown time   • clonazePAM (KlonoPIN) 0.5 MG tablet One a day prn anxiety.  Not to exceed 15 per month. 15 tablet 3 Past Month at Unknown time   • fluticasone (FLONASE) 50 MCG/ACT nasal spray 1 spray into the nostril(s) as directed by provider Daily. 16 g 5 12/16/2020 at Unknown time   • metoprolol tartrate (LOPRESSOR) 50 MG tablet TAKE 1 TABLET BY MOUTH TWICE A  tablet 1 12/17/2020 at Unknown time   • montelukast (Singulair) 10 MG tablet Take 1 tablet by mouth Every Night. 90 tablet 3 12/16/2020 at Unknown time   • pantoprazole (PROTONIX) 40 MG EC tablet TAKE 1 TABLET TWICE A  tablet 3 12/17/2020 at Unknown time  "      Allergies:  Patient has no known allergies.    ROS:    Pertinent items are noted in HPI     Objective     Blood pressure 146/89, pulse 89, resp. rate 16, height 177.8 cm (70\"), weight 117 kg (259 lb), SpO2 95 %.    Physical Exam   Constitutional: Pt is oriented to person, place, and time and well-developed, well-nourished, and in no distress.   HENT:   Mouth/Throat: Oropharynx is clear and moist.   Neck: Normal range of motion. Neck supple.   Cardiovascular: Normal rate, regular rhythm and normal heart sounds.    Pulmonary/Chest: Effort normal and breath sounds normal. No respiratory distress. No  wheezes.   Abdominal: Soft. Bowel sounds are normal.   Skin: Skin is warm and dry.   Psychiatric: Mood, memory, affect and judgment normal.     Assessment/Plan     Diagnosis:  GERD  Encounter for screening for colon cancer    Anticipated Surgical Procedure:  EGD  Colonoscopy    The risks, benefits, and alternatives of this procedure have been discussed with the patient or the responsible party- the patient understands and agrees to proceed.                                                                "

## 2020-12-18 LAB
LAB AP CASE REPORT: NORMAL
LAB AP DIAGNOSIS COMMENT: NORMAL
PATH REPORT.FINAL DX SPEC: NORMAL
PATH REPORT.GROSS SPEC: NORMAL

## 2021-01-05 ENCOUNTER — OFFICE VISIT (OUTPATIENT)
Dept: FAMILY MEDICINE CLINIC | Facility: CLINIC | Age: 71
End: 2021-01-05

## 2021-01-05 VITALS
BODY MASS INDEX: 37.08 KG/M2 | HEIGHT: 70 IN | SYSTOLIC BLOOD PRESSURE: 140 MMHG | DIASTOLIC BLOOD PRESSURE: 89 MMHG | WEIGHT: 259 LBS

## 2021-01-05 DIAGNOSIS — N40.1 BENIGN PROSTATIC HYPERPLASIA WITH URINARY FREQUENCY: ICD-10-CM

## 2021-01-05 DIAGNOSIS — Z00.00 MEDICARE ANNUAL WELLNESS VISIT, SUBSEQUENT: Primary | ICD-10-CM

## 2021-01-05 DIAGNOSIS — R35.0 BENIGN PROSTATIC HYPERPLASIA WITH URINARY FREQUENCY: ICD-10-CM

## 2021-01-05 DIAGNOSIS — R05.3 CHRONIC COUGH: ICD-10-CM

## 2021-01-05 DIAGNOSIS — M1A.09X0 CHRONIC GOUT OF MULTIPLE SITES, UNSPECIFIED CAUSE: ICD-10-CM

## 2021-01-05 DIAGNOSIS — F41.1 GAD (GENERALIZED ANXIETY DISORDER): ICD-10-CM

## 2021-01-05 DIAGNOSIS — I10 ESSENTIAL HYPERTENSION: ICD-10-CM

## 2021-01-05 DIAGNOSIS — K22.70 BARRETT'S ESOPHAGUS WITHOUT DYSPLASIA: ICD-10-CM

## 2021-01-05 DIAGNOSIS — R73.9 HYPERGLYCEMIA: ICD-10-CM

## 2021-01-05 PROCEDURE — G0439 PPPS, SUBSEQ VISIT: HCPCS | Performed by: FAMILY MEDICINE

## 2021-01-05 RX ORDER — PANTOPRAZOLE SODIUM 40 MG/1
40 TABLET, DELAYED RELEASE ORAL 2 TIMES DAILY
Qty: 180 TABLET | Refills: 3 | Status: SHIPPED | OUTPATIENT
Start: 2021-01-05 | End: 2021-12-02 | Stop reason: SDUPTHER

## 2021-01-05 RX ORDER — METOPROLOL TARTRATE 50 MG/1
50 TABLET, FILM COATED ORAL 2 TIMES DAILY
Qty: 180 TABLET | Refills: 3 | Status: SHIPPED | OUTPATIENT
Start: 2021-01-05 | End: 2021-12-02 | Stop reason: SDUPTHER

## 2021-01-05 RX ORDER — AMLODIPINE BESYLATE 5 MG/1
5 TABLET ORAL DAILY
Qty: 90 TABLET | Refills: 3 | Status: SHIPPED | OUTPATIENT
Start: 2021-01-05 | End: 2021-05-10

## 2021-01-05 NOTE — PROGRESS NOTES
The ABCs of the Annual Wellness Visit  Subsequent Medicare Wellness Visit    F/U HTN.    F/U ELEAZAR.      Subjective   History of Present Illness:  Álvaro Jacques is a 70 y.o. male who presents for a Subsequent Medicare Wellness Visit.  Video visit due to Covid.  Consent obtained.  15-minute visit.  F/U HTN. No orhtostasis.    C/o cough and achy for 2 days.   Around step son who had covid yesterday as positive.  Exposed 2020.  Dereased appetite and no taste.   F/U ELEAZAR.   Doing well with benzo only prn.    F/U ALLA with Coronel's esophagus.  Scope surveillance done .  On PPI BID.    HEALTH RISK ASSESSMENT    Recent Hospitalizations:  No hospitalization(s) within the last year.    Current Medical Providers:  Patient Care Team:  Corey Martínez MD as PCP - General (Family Medicine)    Smoking Status:  Social History     Tobacco Use   Smoking Status Former Smoker   • Types: Cigarettes, Cigars   • Quit date: 1970   • Years since quittin.4   Smokeless Tobacco Never Used   Tobacco Comment    still smokes cigars occasionally        Alcohol Consumption:  Social History     Substance and Sexual Activity   Alcohol Use Yes   • Alcohol/week: 10.0 standard drinks   • Types: 10 Shots of liquor per week    Comment: per week       Depression Screen:   PHQ-2/PHQ-9 Depression Screening 2019   Little interest or pleasure in doing things 0   Feeling down, depressed, or hopeless 0   Total Score 0       Fall Risk Screen:  STEADI Fall Risk Assessment has not been completed.    Health Habits and Functional and Cognitive Screening:  Functional & Cognitive Status 2021   Do you have difficulty preparing food and eating? No   Do you have difficulty bathing yourself, getting dressed or grooming yourself? No   Do you have difficulty using the toilet? No   Do you have difficulty moving around from place to place? No   Do you have trouble with steps or getting out of a bed or a chair? No   Current Diet Well Balanced Diet    Dental Exam Up to date   Eye Exam Up to date   Exercise (times per week) 0 times per week   Current Exercises Include No Regular Exercise   Current Exercise Activities Include -   Do you need help using the phone?  No   Are you deaf or do you have serious difficulty hearing?  No   Do you need help with transportation? No   Do you need help shopping? No   Do you need help preparing meals?  No   Do you need help with housework?  No   Do you need help with laundry? No   Do you need help taking your medications? No   Do you need help managing money? No   Do you ever drive or ride in a car without wearing a seat belt? No   Have you felt unusual stress, anger or loneliness in the last month? No   Who do you live with? Spouse   If you need help, do you have trouble finding someone available to you? No   Have you been bothered in the last four weeks by sexual problems? No   Do you have difficulty concentrating, remembering or making decisions? No         Does the patient have evidence of cognitive impairment? No    Asprin use counseling:Does not need ASA (and currently is not on it)    Age-appropriate Screening Schedule:  Refer to the list below for future screening recommendations based on patient's age, sex and/or medical conditions. Orders for these recommended tests are listed in the plan section. The patient has been provided with a written plan.    Health Maintenance   Topic Date Due   • TDAP/TD VACCINES (1 - Tdap) 05/30/1971   • ZOSTER VACCINE (1 of 2) 05/30/2002   • DXA SCAN  07/30/2019   • COLONOSCOPY  12/17/2023   • INFLUENZA VACCINE  Completed          The following portions of the patient's history were reviewed and updated as appropriate: allergies, current medications, past family history, past medical history, past social history, past surgical history and problem list.    Outpatient Medications Prior to Visit   Medication Sig Dispense Refill   • clonazePAM (KlonoPIN) 0.5 MG tablet One a day prn anxiety.   Not to exceed 15 per month. 15 tablet 3   • fluticasone (FLONASE) 50 MCG/ACT nasal spray 1 spray into the nostril(s) as directed by provider Daily. 16 g 5   • montelukast (Singulair) 10 MG tablet Take 1 tablet by mouth Every Night. 90 tablet 3   • amLODIPine (NORVASC) 5 MG tablet Take 1 tablet by mouth Daily. 90 tablet 1   • metoprolol tartrate (LOPRESSOR) 50 MG tablet TAKE 1 TABLET BY MOUTH TWICE A  tablet 1   • pantoprazole (PROTONIX) 40 MG EC tablet TAKE 1 TABLET TWICE A  tablet 3     No facility-administered medications prior to visit.        Patient Active Problem List   Diagnosis   • Seasonal allergic rhinitis due to pollen   • Antral gastritis   • Coronel's esophagus without dysplasia   • Recurrent major depressive disorder, in partial remission (CMS/HCC)   • Elevated liver function tests   • Other emphysema (CMS/HCC)   • Essential hypertension   • Chronic gout of multiple sites   • Osteoarthritis of both knees   • Primary insomnia   • Benign prostatic hyperplasia with urinary frequency   • Immunization deficiency   • Non-intractable vomiting with nausea   • Medicare annual wellness visit, subsequent   • Hyperamylasemia   • ELEAZAR (generalized anxiety disorder)   • Bronchitis   • Chronic cough   • Colon cancer screening   • Hyperglycemia       Advanced Care Planning:  ACP discussion was held with the patient during this visit. Patient does not have an advance directive, information provided.    Review of Systems   Constitutional: Positive for fatigue and fever. Negative for activity change and appetite change.   HENT: Positive for postnasal drip and rhinorrhea. Negative for hearing loss.    Eyes: Negative for discharge and itching.   Respiratory: Positive for cough. Negative for shortness of breath.    Cardiovascular: Negative for chest pain and leg swelling.   Gastrointestinal: Negative for abdominal distention and abdominal pain.   Endocrine: Negative for cold intolerance and heat intolerance.  "  Genitourinary: Negative for difficulty urinating and flank pain.   Musculoskeletal: Negative for arthralgias and myalgias.   Skin: Negative for color change.   Neurological: Negative for dizziness and facial asymmetry.   Hematological: Negative for adenopathy.   Psychiatric/Behavioral: Negative for agitation and confusion.       Compared to one year ago, the patient feels his physical health is the same.  Compared to one year ago, the patient feels his mental health is the same.    Reviewed chart for potential of high risk medication in the elderly: yes  Reviewed chart for potential of harmful drug interactions in the elderly:yes    Objective         Vitals:    01/05/21 0915   BP: 140/89   Weight: 117 kg (259 lb)   Height: 177.8 cm (70\")       Body mass index is 37.16 kg/m².  Discussed the patient's BMI with him. The BMI is above average; BMI management plan is completed.    Physical Exam  Pulmonary:      Effort: Pulmonary effort is normal.   Neurological:      Mental Status: He is oriented to person, place, and time.   Psychiatric:         Thought Content: Thought content normal.         Judgment: Judgment normal.               Assessment/Plan   Medicare Risks and Personalized Health Plan  CMS Preventative Services Quick Reference  Inactivity/Sedentary    The above risks/problems have been discussed with the patient.  Pertinent information has been shared with the patient in the After Visit Summary.  Follow up plans and orders are seen below in the Assessment/Plan Section.    Diagnoses and all orders for this visit:    1. Medicare annual wellness visit, subsequent (Primary)    2. Chronic cough    3. Benign prostatic hyperplasia with urinary frequency  -     Cancel: PSA DIAGNOSTIC  -     PSA DIAGNOSTIC; Future    4. Coronel's esophagus without dysplasia  -     pantoprazole (PROTONIX) 40 MG EC tablet; Take 1 tablet by mouth 2 (Two) Times a Day.  Dispense: 180 tablet; Refill: 3    5. Essential hypertension  -     " Cancel: Comprehensive Metabolic Panel  -     Cancel: Lipid Panel With / Chol / HDL Ratio  -     metoprolol tartrate (LOPRESSOR) 50 MG tablet; Take 1 tablet by mouth 2 (Two) Times a Day.  Dispense: 180 tablet; Refill: 3  -     amLODIPine (NORVASC) 5 MG tablet; Take 1 tablet by mouth Daily.  Dispense: 90 tablet; Refill: 3  -     Comprehensive Metabolic Panel; Future  -     Lipid Panel With / Chol / HDL Ratio; Future    6. ELEAZAR (generalized anxiety disorder)    7. Chronic gout of multiple sites, unspecified cause  -     Cancel: Comprehensive Metabolic Panel  -     Cancel: Uric Acid  -     Comprehensive Metabolic Panel; Future  -     Uric Acid; Future    8. Hyperglycemia  -     Cancel: Comprehensive Metabolic Panel  -     Comprehensive Metabolic Panel; Future  -     Hemoglobin A1c; Future    Get tested for covid.  Quarantine measures recommended.    Continue PPI BID.    RF metoprolol and amlodipine.    Continue prn benzo.    Check A1C.  Check uric acid.      Follow Up:  No follow-ups on file.     An After Visit Summary and PPPS were given to the patient.     Preventive Counseling:  C scope utd.  Encouraged to start regular exercise.  Check PSA.  Pneumovax and flu utd.

## 2021-01-06 ENCOUNTER — TELEPHONE (OUTPATIENT)
Dept: FAMILY MEDICINE CLINIC | Facility: CLINIC | Age: 71
End: 2021-01-06

## 2021-01-06 NOTE — TELEPHONE ENCOUNTER
The patients spouse called in stating the patient was diagnosed with covid. She wants to know if the doctor can prescribe a antibiotic and steroid?    [facility] [address]    Best call back # [phone]

## 2021-01-07 ENCOUNTER — TRANSCRIBE ORDERS (OUTPATIENT)
Dept: ADMINISTRATIVE | Facility: HOSPITAL | Age: 71
End: 2021-01-07

## 2021-01-07 ENCOUNTER — TELEPHONE (OUTPATIENT)
Dept: FAMILY MEDICINE CLINIC | Facility: CLINIC | Age: 71
End: 2021-01-07

## 2021-01-07 DIAGNOSIS — U07.1 CLINICAL DIAGNOSIS OF SEVERE ACUTE RESPIRATORY SYNDROME CORONAVIRUS 2 (SARS-COV-2) DISEASE: Primary | ICD-10-CM

## 2021-01-07 NOTE — TELEPHONE ENCOUNTER
RICKEY pt.  Describe antibody infusion tx with risks/benefits.  Pt wishes to proceed.  Will set up.

## 2021-01-07 NOTE — TELEPHONE ENCOUNTER
Caller: NGOC NAGEL    Relationship: Emergency Contact-WIFE    Best call back number: [phone]    Medication needed: ANTIBIOTIC AND STEROID    When do you need the refill by: 1/8/2021    What details did the patient provide when requesting the medication: PATIENT'S WIFE IS REQUESTING ANTIBIOTIC/STEROID FOR PATIENT. PATIENT TESTED POSITIVE FOR COVID WITH EXTREME COUGH. REQUESTED ROUTED TO WRONG OFFICE 1/6/21.     Does the patient have less than a 3 day supply:  [x] Yes  [] No    What is the patient's preferred pharmacy: [id number] - [place], [place] - [address] AT [facility] - [phone]  - [phone] FX

## 2021-01-08 ENCOUNTER — HOSPITAL ENCOUNTER (OUTPATIENT)
Dept: INFUSION THERAPY | Facility: HOSPITAL | Age: 71
Discharge: HOME | End: 2021-01-08
Admitting: FAMILY MEDICINE

## 2021-01-08 VITALS
DIASTOLIC BLOOD PRESSURE: 77 MMHG | TEMPERATURE: 97.5 F | SYSTOLIC BLOOD PRESSURE: 115 MMHG | RESPIRATION RATE: 20 BRPM | OXYGEN SATURATION: 98 % | HEART RATE: 83 BPM

## 2021-01-08 DIAGNOSIS — U07.1 CLINICAL DIAGNOSIS OF COVID-19: Primary | ICD-10-CM

## 2021-01-08 PROCEDURE — 25010000006 BAMLANIVIMAB 700 MG/20ML SOLUTION 20 ML VIAL: Performed by: FAMILY MEDICINE

## 2021-01-08 PROCEDURE — M0239 BAMLANIVIMAB-XXXX INFUSION: HCPCS | Performed by: FAMILY MEDICINE

## 2021-01-08 PROCEDURE — 96365 THER/PROPH/DIAG IV INF INIT: CPT | Performed by: NURSE PRACTITIONER

## 2021-01-08 RX ORDER — EPINEPHRINE 1 MG/ML
0.3 INJECTION, SOLUTION, CONCENTRATE INTRAVENOUS AS NEEDED
Status: DISCONTINUED | OUTPATIENT
Start: 2021-01-08 | End: 2021-01-10 | Stop reason: HOSPADM

## 2021-01-08 RX ORDER — METHYLPREDNISOLONE SODIUM SUCCINATE 125 MG/2ML
125 INJECTION, POWDER, LYOPHILIZED, FOR SOLUTION INTRAMUSCULAR; INTRAVENOUS AS NEEDED
Status: DISCONTINUED | OUTPATIENT
Start: 2021-01-08 | End: 2021-01-10 | Stop reason: HOSPADM

## 2021-01-08 RX ORDER — SODIUM CHLORIDE 9 MG/ML
250 INJECTION, SOLUTION INTRAVENOUS ONCE
Status: CANCELLED | OUTPATIENT
Start: 2021-01-08

## 2021-01-08 RX ORDER — SODIUM CHLORIDE 9 MG/ML
250 INJECTION, SOLUTION INTRAVENOUS ONCE
Status: COMPLETED | OUTPATIENT
Start: 2021-01-08 | End: 2021-01-08

## 2021-01-08 RX ORDER — DIPHENHYDRAMINE HYDROCHLORIDE 50 MG/ML
50 INJECTION INTRAMUSCULAR; INTRAVENOUS AS NEEDED
Status: DISCONTINUED | OUTPATIENT
Start: 2021-01-08 | End: 2021-01-10 | Stop reason: HOSPADM

## 2021-01-08 RX ORDER — DIPHENHYDRAMINE HYDROCHLORIDE 50 MG/ML
50 INJECTION INTRAMUSCULAR; INTRAVENOUS AS NEEDED
Status: CANCELLED | OUTPATIENT
Start: 2021-01-08

## 2021-01-08 RX ORDER — METHYLPREDNISOLONE SODIUM SUCCINATE 125 MG/2ML
125 INJECTION, POWDER, LYOPHILIZED, FOR SOLUTION INTRAMUSCULAR; INTRAVENOUS AS NEEDED
Status: CANCELLED | OUTPATIENT
Start: 2021-01-08

## 2021-01-08 RX ORDER — EPINEPHRINE 1 MG/ML
0.3 INJECTION, SOLUTION, CONCENTRATE INTRAVENOUS AS NEEDED
Status: CANCELLED | OUTPATIENT
Start: 2021-01-08

## 2021-01-08 RX ADMIN — SODIUM CHLORIDE 700 MG: 9 INJECTION, SOLUTION INTRAVENOUS at 09:41

## 2021-01-08 RX ADMIN — SODIUM CHLORIDE 100 ML: 9 INJECTION, SOLUTION INTRAVENOUS at 10:54

## 2021-02-26 ENCOUNTER — TELEPHONE (OUTPATIENT)
Dept: GASTROENTEROLOGY | Facility: CLINIC | Age: 71
End: 2021-02-26

## 2021-02-26 DIAGNOSIS — K22.70 BARRETT'S ESOPHAGUS WITHOUT DYSPLASIA: Primary | ICD-10-CM

## 2021-03-04 ENCOUNTER — TELEPHONE (OUTPATIENT)
Dept: GASTROENTEROLOGY | Facility: CLINIC | Age: 71
End: 2021-03-04

## 2021-03-09 NOTE — TELEPHONE ENCOUNTER
spoke with pt scheduled at [facility] on april 15 arrive at 0830 am keron pate

## 2021-03-19 ENCOUNTER — BULK ORDERING (OUTPATIENT)
Dept: CASE MANAGEMENT | Facility: OTHER | Age: 71
End: 2021-03-19

## 2021-03-19 DIAGNOSIS — Z23 IMMUNIZATION DUE: ICD-10-CM

## 2021-03-30 ENCOUNTER — TRANSCRIBE ORDERS (OUTPATIENT)
Dept: SLEEP MEDICINE | Facility: HOSPITAL | Age: 71
End: 2021-03-30

## 2021-03-30 DIAGNOSIS — Z01.818 OTHER SPECIFIED PRE-OPERATIVE EXAMINATION: Primary | ICD-10-CM

## 2021-04-02 ENCOUNTER — IMMUNIZATION (OUTPATIENT)
Dept: VACCINE CLINIC | Facility: HOSPITAL | Age: 71
End: 2021-04-02

## 2021-04-02 DIAGNOSIS — Z23 IMMUNIZATION DUE: ICD-10-CM

## 2021-04-02 PROCEDURE — 0001A: CPT | Performed by: INTERNAL MEDICINE

## 2021-04-02 PROCEDURE — 91300 HC SARSCOV02 VAC 30MCG/0.3ML IM: CPT | Performed by: INTERNAL MEDICINE

## 2021-04-13 ENCOUNTER — LAB (OUTPATIENT)
Dept: LAB | Facility: HOSPITAL | Age: 71
End: 2021-04-13

## 2021-04-13 DIAGNOSIS — Z01.818 OTHER SPECIFIED PRE-OPERATIVE EXAMINATION: ICD-10-CM

## 2021-04-13 PROCEDURE — C9803 HOPD COVID-19 SPEC COLLECT: HCPCS

## 2021-04-13 PROCEDURE — U0004 COV-19 TEST NON-CDC HGH THRU: HCPCS

## 2021-04-13 PROCEDURE — U0005 INFEC AGEN DETEC AMPLI PROBE: HCPCS

## 2021-04-14 LAB — SARS-COV-2 RNA RESP QL NAA+PROBE: NOT DETECTED

## 2021-04-14 RX ORDER — IBUPROFEN 600 MG/1
600 TABLET ORAL DAILY PRN
COMMUNITY

## 2021-04-15 ENCOUNTER — ANESTHESIA (OUTPATIENT)
Dept: GASTROENTEROLOGY | Facility: HOSPITAL | Age: 71
End: 2021-04-15

## 2021-04-15 ENCOUNTER — HOSPITAL ENCOUNTER (OUTPATIENT)
Facility: HOSPITAL | Age: 71
Setting detail: HOSPITAL OUTPATIENT SURGERY
Discharge: HOME | End: 2021-04-15
Attending: INTERNAL MEDICINE | Admitting: INTERNAL MEDICINE

## 2021-04-15 ENCOUNTER — ANESTHESIA EVENT (OUTPATIENT)
Dept: GASTROENTEROLOGY | Facility: HOSPITAL | Age: 71
End: 2021-04-15

## 2021-04-15 VITALS
DIASTOLIC BLOOD PRESSURE: 112 MMHG | BODY MASS INDEX: 37.21 KG/M2 | SYSTOLIC BLOOD PRESSURE: 173 MMHG | HEART RATE: 80 BPM | RESPIRATION RATE: 16 BRPM | OXYGEN SATURATION: 99 % | TEMPERATURE: 98.1 F | HEIGHT: 70 IN | WEIGHT: 259.9 LBS

## 2021-04-15 DIAGNOSIS — K22.70 BARRETT'S ESOPHAGUS WITHOUT DYSPLASIA: ICD-10-CM

## 2021-04-15 PROCEDURE — 43239 EGD BIOPSY SINGLE/MULTIPLE: CPT | Performed by: INTERNAL MEDICINE

## 2021-04-15 PROCEDURE — 88305 TISSUE EXAM BY PATHOLOGIST: CPT | Performed by: INTERNAL MEDICINE

## 2021-04-15 PROCEDURE — 25010000002 PROPOFOL 10 MG/ML EMULSION: Performed by: ANESTHESIOLOGY

## 2021-04-15 RX ORDER — SODIUM CHLORIDE, SODIUM LACTATE, POTASSIUM CHLORIDE, CALCIUM CHLORIDE 600; 310; 30; 20 MG/100ML; MG/100ML; MG/100ML; MG/100ML
30 INJECTION, SOLUTION INTRAVENOUS CONTINUOUS PRN
Status: DISCONTINUED | OUTPATIENT
Start: 2021-04-15 | End: 2021-04-15 | Stop reason: HOSPADM

## 2021-04-15 RX ORDER — LIDOCAINE HYDROCHLORIDE 20 MG/ML
INJECTION, SOLUTION INFILTRATION; PERINEURAL AS NEEDED
Status: DISCONTINUED | OUTPATIENT
Start: 2021-04-15 | End: 2021-04-15 | Stop reason: SURG

## 2021-04-15 RX ORDER — PROPOFOL 10 MG/ML
VIAL (ML) INTRAVENOUS AS NEEDED
Status: DISCONTINUED | OUTPATIENT
Start: 2021-04-15 | End: 2021-04-15 | Stop reason: SURG

## 2021-04-15 RX ADMIN — SODIUM CHLORIDE, POTASSIUM CHLORIDE, SODIUM LACTATE AND CALCIUM CHLORIDE 30 ML/HR: 600; 310; 30; 20 INJECTION, SOLUTION INTRAVENOUS at 08:46

## 2021-04-15 RX ADMIN — PROPOFOL 300 MG: 10 INJECTION, EMULSION INTRAVENOUS at 09:26

## 2021-04-15 RX ADMIN — LIDOCAINE HYDROCHLORIDE 100 MG: 20 INJECTION, SOLUTION INFILTRATION; PERINEURAL at 09:26

## 2021-04-15 NOTE — ANESTHESIA POSTPROCEDURE EVALUATION
Patient: Álvaro Jacques    Procedure Summary     Date: 04/15/21 Room / Location:  [facility] /  [facility]    Anesthesia Start: 0922 Anesthesia Stop: 0947    Procedure: ESOPHAGOGASTRODUODENOSCOPY WITH BIOPSIES (N/A Esophagus) Diagnosis:       Coronel's esophagus without dysplasia      (Coronel's esophagus without dysplasia [K22.70])    Surgeons: Vincent Man MD Provider: Pradeep Jasmine MD    Anesthesia Type: MAC ASA Status: 3          Anesthesia Type: MAC    Vitals  Vitals Value Taken Time   /112 04/15/21 1008   Temp     Pulse 80 04/15/21 1005   Resp 16 04/15/21 1005   SpO2 99 % 04/15/21 1005           Post Anesthesia Care and Evaluation    Patient location during evaluation: bedside  Patient participation: complete - patient participated  Level of consciousness: responsive to light touch, responsive to verbal stimuli and sleepy but conscious  Pain score: 0  Pain management: adequate  Airway patency: patent  Anesthetic complications: No anesthetic complications  PONV Status: none  Cardiovascular status: acceptable and hemodynamically stable  Respiratory status: acceptable  Hydration status: acceptable

## 2021-04-15 NOTE — H&P
[facility]  Pre Procedure History & Physical    Chief Complaint:   Barretts    Subjective     HPI:   70 y.o. male here for surveillance EGD - hx of Barretts - EGD from December with Coronel's, indeterminate for LGD.  Here for surveillance EGD with rebiopsy    Past Medical History:   Past Medical History:   Diagnosis Date   • Allergic rhinitis    • Antral gastritis    • Coronel's esophagus    • Calcaneal spur    • COVID-19     January 2021; had infusion   • Depression with anxiety    • Elevated liver function tests    • Emphysema lung (CMS/HCC)    • Enlarged aorta (CMS/HCC)     no treatment needed per patient   • GERD (gastroesophageal reflux disease)    • Gout    • Hypertension    • Inability to attain erection    • Influenza    • Knee osteoarthritis    • Osteoarthritis    • Osteoporosis    • Primary insomnia    • Urinary frequency        Family History:  Family History   Problem Relation Age of Onset   • Heart disease Mother    • Malig Hyperthermia Neg Hx        Social History:   reports that he quit smoking about 50 years ago. His smoking use included cigarettes and cigars. He has never used smokeless tobacco. He reports current alcohol use of about 10.0 standard drinks of alcohol per week. He reports that he does not use drugs.    Medications:   Medications Prior to Admission   Medication Sig Dispense Refill Last Dose   • amLODIPine (NORVASC) 5 MG tablet Take 1 tablet by mouth Daily. 90 tablet 3 4/15/2021 at Unknown time   • ibuprofen (ADVIL,MOTRIN) 600 MG tablet Take 600 mg by mouth Daily As Needed for Mild Pain .   4/13/2021   • metoprolol tartrate (LOPRESSOR) 50 MG tablet Take 1 tablet by mouth 2 (Two) Times a Day. 180 tablet 3 4/15/2021 at Unknown time   • montelukast (Singulair) 10 MG tablet Take 1 tablet by mouth Every Night. 90 tablet 3 4/14/2021 at Unknown time   • pantoprazole (PROTONIX) 40 MG EC tablet Take 1 tablet by mouth 2 (Two) Times a Day. 180 tablet 3 4/14/2021 at Unknown  "time   • clonazePAM (KlonoPIN) 0.5 MG tablet One a day prn anxiety.  Not to exceed 15 per month. (Patient taking differently: One a day prn anxiety.  Not to exceed 15 per month. NO LONGER TAKING) 15 tablet 3    • fluticasone (FLONASE) 50 MCG/ACT nasal spray 1 spray into the nostril(s) as directed by provider Daily. 16 g 5        Allergies:  Patient has no known allergies.    ROS:    Pertinent items are noted in HPI     Objective     Blood pressure 137/96, pulse 74, temperature 98.1 °F (36.7 °C), temperature source Oral, resp. rate 18, height 177.8 cm (70\"), weight 118 kg (259 lb 14.4 oz), SpO2 96 %.    Physical Exam   Constitutional: Pt is oriented to person, place, and time and well-developed, well-nourished, and in no distress.   HENT:   Mouth/Throat: Oropharynx is clear and moist.   Neck: Normal range of motion. Neck supple.   Cardiovascular: Normal rate, regular rhythm and normal heart sounds.    Pulmonary/Chest: Effort normal and breath sounds normal. No respiratory distress. No  wheezes.   Abdominal: Soft. Bowel sounds are normal.   Skin: Skin is warm and dry.   Psychiatric: Mood, memory, affect and judgment normal.     Assessment/Plan     Diagnosis:  Coronel's esophagus    Anticipated Surgical Procedure:  EGD    The risks, benefits, and alternatives of this procedure have been discussed with the patient or the responsible party- the patient understands and agrees to proceed.                                                                "

## 2021-04-15 NOTE — ANESTHESIA PREPROCEDURE EVALUATION
Anesthesia Evaluation     Patient summary reviewed and Nursing notes reviewed   no history of anesthetic complications:  NPO Solid Status: > 8 hours             Airway   Mallampati: I  TM distance: >3 FB  Neck ROM: full  Dental      Comment: Small chips on upper front teeth    Pulmonary    (+) a smoker Former, COPD,   (-) shortness of breath, recent URI, sleep apnea  Cardiovascular     (+) hypertension,   (-) dysrhythmias, angina, hyperlipidemia      Neuro/Psych  (+) psychiatric history Anxiety and Depression,     GI/Hepatic/Renal/Endo    (+) obesity,  GERD,  liver disease history of elevated LFT,   (-) diabetes    Musculoskeletal     Abdominal    Substance History      OB/GYN          Other                          Anesthesia Plan    ASA 3     MAC     intravenous induction     Anesthetic plan, all risks, benefits, and alternatives have been provided, discussed and informed consent has been obtained with: patient.

## 2021-04-15 NOTE — DISCHARGE INSTRUCTIONS
For the next 24 hours patient needs to be with a responsible adult.    For 24 hours DO NOT drive, operate machinery, appliances, drink alcohol, make important decisions or sign legal documents.    Start with a light or bland diet if you are feeling sick to your stomach otherwise advance to regular diet as tolerated.    Follow recommendations on procedure report if provided by your doctor.    Call Dr Man for problems [phone]    Problems may include but not limited to: large amounts of bleeding, trouble breathing, repeated vomiting, severe unrelieved pain, fever or chills.

## 2021-04-16 LAB
CYTO UR: NORMAL
LAB AP CASE REPORT: NORMAL
LAB AP DIAGNOSIS COMMENT: NORMAL
PATH REPORT.FINAL DX SPEC: NORMAL
PATH REPORT.GROSS SPEC: NORMAL

## 2021-04-23 ENCOUNTER — IMMUNIZATION (OUTPATIENT)
Dept: VACCINE CLINIC | Facility: HOSPITAL | Age: 71
End: 2021-04-23

## 2021-04-23 PROCEDURE — 0002A: CPT | Performed by: INTERNAL MEDICINE

## 2021-04-23 PROCEDURE — 91300 HC SARSCOV02 VAC 30MCG/0.3ML IM: CPT | Performed by: INTERNAL MEDICINE

## 2021-05-10 ENCOUNTER — OFFICE VISIT (OUTPATIENT)
Dept: FAMILY MEDICINE CLINIC | Facility: CLINIC | Age: 71
End: 2021-05-10

## 2021-05-10 VITALS
BODY MASS INDEX: 37.22 KG/M2 | OXYGEN SATURATION: 98 % | HEART RATE: 77 BPM | TEMPERATURE: 97.8 F | HEIGHT: 70 IN | DIASTOLIC BLOOD PRESSURE: 74 MMHG | WEIGHT: 260 LBS | SYSTOLIC BLOOD PRESSURE: 130 MMHG

## 2021-05-10 DIAGNOSIS — R60.0 LOCALIZED EDEMA: Primary | ICD-10-CM

## 2021-05-10 DIAGNOSIS — I10 ESSENTIAL HYPERTENSION: ICD-10-CM

## 2021-05-10 DIAGNOSIS — M1A.09X0 CHRONIC GOUT OF MULTIPLE SITES, UNSPECIFIED CAUSE: ICD-10-CM

## 2021-05-10 DIAGNOSIS — R73.9 HYPERGLYCEMIA: ICD-10-CM

## 2021-05-10 DIAGNOSIS — N40.1 BENIGN PROSTATIC HYPERPLASIA WITH URINARY FREQUENCY: ICD-10-CM

## 2021-05-10 DIAGNOSIS — R35.0 BENIGN PROSTATIC HYPERPLASIA WITH URINARY FREQUENCY: ICD-10-CM

## 2021-05-10 PROCEDURE — 99214 OFFICE O/P EST MOD 30 MIN: CPT | Performed by: FAMILY MEDICINE

## 2021-05-10 RX ORDER — SPIRONOLACTONE 25 MG/1
25 TABLET ORAL DAILY
Qty: 90 TABLET | Refills: 3 | Status: SHIPPED | OUTPATIENT
Start: 2021-05-10 | End: 2021-09-02 | Stop reason: SDUPTHER

## 2021-05-10 NOTE — PROGRESS NOTES
Chief Complaint   Patient presents with   • Edema       Subjective   Álvaro Jacques is a 70 y.o. male.     History of Present Illness   F/U HTN.  No orthostasis.  BP doing well at home.    C/o 3-4 month history of LE edema bilaterally.   Increased.  No SOA.    The following portions of the patient's history were reviewed and updated as appropriate: allergies, current medications, past family history, past medical history, past social history, past surgical history and problem list.    Review of Systems   Constitutional: Negative for appetite change and fatigue.   HENT: Negative for nosebleeds and sore throat.    Eyes: Negative for blurred vision and visual disturbance.   Respiratory: Negative for shortness of breath and wheezing.    Cardiovascular: Positive for leg swelling. Negative for chest pain.   Gastrointestinal: Negative for abdominal distention and abdominal pain.   Endocrine: Negative for cold intolerance and polyuria.   Genitourinary: Negative for dysuria and hematuria.   Musculoskeletal: Negative for arthralgias and myalgias.   Skin: Negative for color change and rash.   Neurological: Negative for weakness and confusion.   Psychiatric/Behavioral: Negative for agitation and depressed mood.       Patient Active Problem List   Diagnosis   • Seasonal allergic rhinitis due to pollen   • Antral gastritis   • Coronel's esophagus without dysplasia   • Recurrent major depressive disorder, in partial remission (CMS/HCC)   • Elevated liver function tests   • Other emphysema (CMS/HCC)   • Essential hypertension   • Chronic gout of multiple sites   • Osteoarthritis of both knees   • Primary insomnia   • Benign prostatic hyperplasia with urinary frequency   • Immunization deficiency   • Non-intractable vomiting with nausea   • Medicare annual wellness visit, subsequent   • Hyperamylasemia   • ELEAZAR (generalized anxiety disorder)   • Bronchitis   • Chronic cough   • Colon cancer screening   • Hyperglycemia   • Clinical  diagnosis of COVID-19   • Localized edema       No Known Allergies      Current Outpatient Medications:   •  fluticasone (FLONASE) 50 MCG/ACT nasal spray, 1 spray into the nostril(s) as directed by provider Daily., Disp: 16 g, Rfl: 5  •  ibuprofen (ADVIL,MOTRIN) 600 MG tablet, Take 600 mg by mouth Daily As Needed for Mild Pain ., Disp: , Rfl:   •  metoprolol tartrate (LOPRESSOR) 50 MG tablet, Take 1 tablet by mouth 2 (Two) Times a Day., Disp: 180 tablet, Rfl: 3  •  montelukast (Singulair) 10 MG tablet, Take 1 tablet by mouth Every Night., Disp: 90 tablet, Rfl: 3  •  pantoprazole (PROTONIX) 40 MG EC tablet, Take 1 tablet by mouth 2 (Two) Times a Day., Disp: 180 tablet, Rfl: 3  •  clonazePAM (KlonoPIN) 0.5 MG tablet, One a day prn anxiety.  Not to exceed 15 per month. (Patient taking differently: One a day prn anxiety.  Not to exceed 15 per month. NO LONGER TAKING), Disp: 15 tablet, Rfl: 3  •  spironolactone (Aldactone) 25 MG tablet, Take 1 tablet by mouth Daily., Disp: 90 tablet, Rfl: 3    Past Medical History:   Diagnosis Date   • Allergic rhinitis    • Antral gastritis    • Coronel's esophagus    • Calcaneal spur    • COVID-19     January 2021; had infusion   • Depression with anxiety    • Elevated liver function tests    • Emphysema lung (CMS/HCC)    • Enlarged aorta (CMS/HCC)     no treatment needed per patient   • GERD (gastroesophageal reflux disease)    • Gout    • Hypertension    • Inability to attain erection    • Influenza    • Knee osteoarthritis    • Osteoarthritis    • Osteoporosis    • Primary insomnia    • Urinary frequency        Past Surgical History:   Procedure Laterality Date   • COLONOSCOPY  2010   • COLONOSCOPY W/ POLYPECTOMY N/A 12/17/2020    Procedure: COLONOSCOPY TO CECUM WITH COLD AND HOT SNARE POLYPECTOMIES AND COLD BIOPSY POLYPECTOMY;  Surgeon: Vincent Man MD;  Location: [facility] ENDOSCOPY;  Service: Gastroenterology;  Laterality: N/A;  PRE: SCREENING  POST: POLYPS, DIVERTICULOSIS    • ENDOSCOPY N/A 2020    Procedure: ESOPHAGOGASTRODUODENOSCOPY WITH COLD BIOPSIES;  Surgeon: Vincent Man MD;  Location:  [facility] ENDOSCOPY;  Service: Gastroenterology;  Laterality: N/A;  PRE: HX BARRETTS ESOPHAGUS  POST: BARRETTS ESOPHAGUS, GASTRIC POLYPS, HIATAL HERNIA, GASTRITIS, DUODENAL LYPOMA   • ENDOSCOPY N/A 4/15/2021    Procedure: ESOPHAGOGASTRODUODENOSCOPY WITH BIOPSIES;  Surgeon: Vincent Man MD;  Location:  [facility] ENDOSCOPY;  Service: Gastroenterology;  Laterality: N/A;  PRE OP - H/O SALDANA'S  POST OP -   • EYE SURGERY      CATARACTS   • HERNIA REPAIR     • KNEE ARTHROSCOPY         Family History   Problem Relation Age of Onset   • Heart disease Mother    • Malig Hyperthermia Neg Hx        Social History     Tobacco Use   • Smoking status: Former Smoker     Types: Cigarettes, Cigars     Quit date: 1970     Years since quittin.8   • Smokeless tobacco: Never Used   • Tobacco comment: still smokes cigars occasionally    Substance Use Topics   • Alcohol use: Yes     Alcohol/week: 10.0 standard drinks     Types: 10 Shots of liquor per week     Comment: per week            Objective     Vitals:    05/10/21 1056   BP: 130/74   Pulse: 77   Temp: 97.8 °F (36.6 °C)   SpO2: 98%     Body mass index is 37.31 kg/m².    Physical Exam  Vitals reviewed.   Constitutional:       Appearance: He is well-developed. He is not diaphoretic.   HENT:      Head: Normocephalic and atraumatic.   Eyes:      General: No scleral icterus.     Pupils: Pupils are equal, round, and reactive to light.   Neck:      Thyroid: No thyromegaly.   Cardiovascular:      Rate and Rhythm: Normal rate and regular rhythm.      Heart sounds: No murmur heard.   No friction rub. No gallop.       Comments: 1 plus edema bilaterally to 2/3 of legs.    Pulmonary:      Effort: Pulmonary effort is normal. No respiratory distress.      Breath sounds: No wheezing or rales.   Chest:      Chest wall: No tenderness.   Abdominal:       General: Bowel sounds are normal. There is no distension.      Palpations: Abdomen is soft.      Tenderness: There is no abdominal tenderness.   Musculoskeletal:         General: No deformity. Normal range of motion.   Lymphadenopathy:      Cervical: No cervical adenopathy.   Skin:     General: Skin is warm and dry.      Findings: No rash.   Neurological:      Cranial Nerves: No cranial nerve deficit.      Motor: No abnormal muscle tone.         Lab Results   Component Value Date    BUN 9 03/16/2020    CREATININE 0.80 09/22/2020    EGFRIFNONA 70 03/03/2020    EGFRIFAFRI 84 03/03/2020    BCR 12.9 03/16/2020    K 4.1 03/16/2020    CO2 28 03/16/2020    CALCIUM 9.3 03/16/2020    PROTENTOTREF 7.6 03/03/2020    ALBUMIN 4.80 03/03/2020    LABIL2 1.7 03/03/2020    AST 62 (H) 03/03/2020    ALT 54 (H) 03/03/2020       WBC   Date Value Ref Range Status   03/16/2020 5.57 4.5 - 11.0 10*3/uL Final     RBC   Date Value Ref Range Status   03/16/2020 5.11 4.5 - 5.9 10*6/uL Final     Hemoglobin   Date Value Ref Range Status   03/16/2020 15.6 13.5 - 17.5 g/dL Final     Hematocrit   Date Value Ref Range Status   03/16/2020 44.8 41.0 - 53.0 % Final     MCV   Date Value Ref Range Status   03/16/2020 87.7 80.0 - 100.0 fL Final     MCH   Date Value Ref Range Status   03/16/2020 30.5 26.0 - 34.0 pg Final     MCHC   Date Value Ref Range Status   03/16/2020 34.8 31.0 - 37.0 g/dL Final     RDW   Date Value Ref Range Status   03/16/2020 15.4 12.0 - 16.8 % Final     MPV   Date Value Ref Range Status   03/16/2020 8.8 6.7 - 10.8 fL Final     Platelets   Date Value Ref Range Status   03/16/2020 164 140 - 440 10*3/uL Final     Neutrophil Rel %   Date Value Ref Range Status   03/16/2020 70.7 45 - 80 % Final     Lymphocyte Rel %   Date Value Ref Range Status   03/16/2020 17.2 15 - 50 % Final     Monocyte Rel %   Date Value Ref Range Status   03/16/2020 8.1 0 - 15 % Final     Eosinophil %   Date Value Ref Range Status   03/16/2020 2.9 0 - 7 % Final      Basophil Rel %   Date Value Ref Range Status   03/16/2020 0.7 0 - 2 % Final     Immature Grans %   Date Value Ref Range Status   03/16/2020 0.4 (H) 0 % Final     Neutrophils Absolute   Date Value Ref Range Status   03/16/2020 3.94 2.0 - 8.8 10*3/uL Final     Lymphocytes Absolute   Date Value Ref Range Status   03/16/2020 0.96 0.7 - 5.5 10*3/uL Final     Monocytes Absolute   Date Value Ref Range Status   03/16/2020 0.45 0.0 - 1.7 10*3/uL Final     Eosinophils Absolute   Date Value Ref Range Status   03/16/2020 0.16 0.0 - 0.8 10*3/uL Final     Basophils Absolute   Date Value Ref Range Status   03/16/2020 0.04 0.0 - 0.2 10*3/uL Final     Immature Grans, Absolute   Date Value Ref Range Status   03/16/2020 0.02 <1 10*3/uL Final     nRBC   Date Value Ref Range Status   03/16/2020 0 0 /100(WBC) Final       No results found for: HGBA1C    No results found for: [id number]    TSH   Date Value Ref Range Status   07/30/2019 2.850 0.270 - 4.200 mIU/mL Final       No results found for: CHOL  Lab Results   Component Value Date    TRIG 74 07/30/2019     Lab Results   Component Value Date    HDL 97 (H) 07/30/2019     Lab Results   Component Value Date    LDL 37 07/30/2019     Lab Results   Component Value Date    VLDL 14.8 07/30/2019     No results found for: LDLHDL      Procedures    Assessment/Plan   Problems Addressed this Visit     Benign prostatic hyperplasia with urinary frequency    Relevant Orders    PSA DIAGNOSTIC    Chronic gout of multiple sites    Relevant Orders    Uric Acid    Essential hypertension    Relevant Medications    spironolactone (Aldactone) 25 MG tablet    Other Relevant Orders    Comprehensive Metabolic Panel    Lipid Panel With / Chol / HDL Ratio    Hyperglycemia    Relevant Orders    Hemoglobin A1c    Localized edema - Primary    Relevant Medications    spironolactone (Aldactone) 25 MG tablet      Diagnoses       Codes Comments    Localized edema    -  Primary ICD-10-CM: R60.0  ICD-9-CM: 782.3      Essential hypertension     ICD-10-CM: I10  ICD-9-CM: 401.9     Benign prostatic hyperplasia with urinary frequency     ICD-10-CM: N40.1, R35.0  ICD-9-CM: 600.01, 788.41     Hyperglycemia     ICD-10-CM: R73.9  ICD-9-CM: 790.29     Chronic gout of multiple sites, unspecified cause     ICD-10-CM: M1A.09X0  ICD-9-CM: 274.02       Edema uncontrolled.  New problem.  Start spironolactone 25 once a day.  Stop amlodipine.    HTn.  Controlled.  Stop amlodipine due to above.  Continue metoprolol.       Orders Placed This Encounter   Procedures   • Comprehensive Metabolic Panel     Order Specific Question:   Release to patient     Answer:   Immediate   • Lipid Panel With / Chol / HDL Ratio     Order Specific Question:   Release to patient     Answer:   Immediate   • Hemoglobin A1c     Order Specific Question:   Release to patient     Answer:   Immediate   • PSA DIAGNOSTIC     Order Specific Question:   Release to patient     Answer:   Immediate   • Uric Acid     Order Specific Question:   Release to patient     Answer:   Immediate       Current Outpatient Medications   Medication Sig Dispense Refill   • fluticasone (FLONASE) 50 MCG/ACT nasal spray 1 spray into the nostril(s) as directed by provider Daily. 16 g 5   • ibuprofen (ADVIL,MOTRIN) 600 MG tablet Take 600 mg by mouth Daily As Needed for Mild Pain .     • metoprolol tartrate (LOPRESSOR) 50 MG tablet Take 1 tablet by mouth 2 (Two) Times a Day. 180 tablet 3   • montelukast (Singulair) 10 MG tablet Take 1 tablet by mouth Every Night. 90 tablet 3   • pantoprazole (PROTONIX) 40 MG EC tablet Take 1 tablet by mouth 2 (Two) Times a Day. 180 tablet 3   • clonazePAM (KlonoPIN) 0.5 MG tablet One a day prn anxiety.  Not to exceed 15 per month. (Patient taking differently: One a day prn anxiety.  Not to exceed 15 per month. NO LONGER TAKING) 15 tablet 3   • spironolactone (Aldactone) 25 MG tablet Take 1 tablet by mouth Daily. 90 tablet 3     No current facility-administered  medications for this visit.       Álvaro Georgie had no medications administered during this visit.    Return in about 3 months (around 8/10/2021).    There are no Patient Instructions on file for this visit.

## 2021-05-11 LAB
ALBUMIN SERPL-MCNC: 4.5 G/DL (ref 3.5–5.2)
ALBUMIN/GLOB SERPL: 1.9 G/DL
ALP SERPL-CCNC: 75 U/L (ref 39–117)
ALT SERPL-CCNC: 39 U/L (ref 1–41)
AST SERPL-CCNC: 38 U/L (ref 1–40)
BILIRUB SERPL-MCNC: 1.8 MG/DL (ref 0–1.2)
BUN SERPL-MCNC: 8 MG/DL (ref 8–23)
BUN/CREAT SERPL: 9.2 (ref 7–25)
CALCIUM SERPL-MCNC: 9.4 MG/DL (ref 8.6–10.5)
CHLORIDE SERPL-SCNC: 104 MMOL/L (ref 98–107)
CHOLEST SERPL-MCNC: 130 MG/DL (ref 0–200)
CHOLEST/HDLC SERPL: 1.6 {RATIO}
CO2 SERPL-SCNC: 27.6 MMOL/L (ref 22–29)
CREAT SERPL-MCNC: 0.87 MG/DL (ref 0.76–1.27)
GLOBULIN SER CALC-MCNC: 2.4 GM/DL
GLUCOSE SERPL-MCNC: 90 MG/DL (ref 65–99)
HBA1C MFR BLD: 4.5 % (ref 4.8–5.6)
HDLC SERPL-MCNC: 81 MG/DL (ref 40–60)
LDLC SERPL CALC-MCNC: 35 MG/DL (ref 0–100)
POTASSIUM SERPL-SCNC: 3.7 MMOL/L (ref 3.5–5.2)
PROT SERPL-MCNC: 6.9 G/DL (ref 6–8.5)
PSA SERPL-MCNC: 2.91 NG/ML (ref 0–4)
SODIUM SERPL-SCNC: 140 MMOL/L (ref 136–145)
TRIGL SERPL-MCNC: 68 MG/DL (ref 0–150)
URATE SERPL-MCNC: 5.8 MG/DL (ref 3.4–7)
VLDLC SERPL CALC-MCNC: 14 MG/DL (ref 5–40)

## 2021-08-04 ENCOUNTER — TELEPHONE (OUTPATIENT)
Dept: GASTROENTEROLOGY | Facility: CLINIC | Age: 71
End: 2021-08-04

## 2021-08-04 NOTE — TELEPHONE ENCOUNTER
----- Message from Vincent Man MD sent at 8/4/2021  7:28 AM EDT -----  No evidence of dysplasia.  Repeat EGD in 6 mos.

## 2021-09-02 ENCOUNTER — OFFICE VISIT (OUTPATIENT)
Dept: FAMILY MEDICINE CLINIC | Facility: CLINIC | Age: 71
End: 2021-09-02

## 2021-09-02 VITALS
DIASTOLIC BLOOD PRESSURE: 86 MMHG | BODY MASS INDEX: 37.37 KG/M2 | TEMPERATURE: 98.2 F | HEIGHT: 70 IN | WEIGHT: 261 LBS | SYSTOLIC BLOOD PRESSURE: 128 MMHG | OXYGEN SATURATION: 98 % | HEART RATE: 74 BPM

## 2021-09-02 DIAGNOSIS — R60.0 LOCALIZED EDEMA: ICD-10-CM

## 2021-09-02 DIAGNOSIS — I10 ESSENTIAL HYPERTENSION: ICD-10-CM

## 2021-09-02 DIAGNOSIS — I87.2 VENOUS INSUFFICIENCY: Primary | ICD-10-CM

## 2021-09-02 PROCEDURE — 99214 OFFICE O/P EST MOD 30 MIN: CPT | Performed by: FAMILY MEDICINE

## 2021-09-02 RX ORDER — SPIRONOLACTONE 25 MG/1
25 TABLET ORAL 2 TIMES DAILY
Qty: 180 TABLET | Refills: 3 | Status: SHIPPED | OUTPATIENT
Start: 2021-09-02 | End: 2021-12-02 | Stop reason: SDUPTHER

## 2021-09-02 NOTE — PROGRESS NOTES
Chief Complaint   Patient presents with   • Hypertension       Subjective   Álvaro Jacques is a 71 y.o. male.     History of Present Illness   F/U LE edema.  Going on 6 months now. No SOA.     F/U HTN. nO orthostasis.      The following portions of the patient's history were reviewed and updated as appropriate: allergies, current medications, past family history, past medical history, past social history, past surgical history and problem list.    Review of Systems   Constitutional: Negative for appetite change and fatigue.   HENT: Negative for nosebleeds and sore throat.    Eyes: Negative for blurred vision and visual disturbance.   Respiratory: Negative for shortness of breath and wheezing.    Cardiovascular: Positive for leg swelling. Negative for chest pain.   Gastrointestinal: Negative for abdominal distention and abdominal pain.   Endocrine: Negative for cold intolerance and polyuria.   Genitourinary: Negative for dysuria and hematuria.   Musculoskeletal: Negative for arthralgias and myalgias.   Skin: Negative for color change and rash.   Neurological: Negative for weakness and confusion.   Psychiatric/Behavioral: Negative for agitation and depressed mood.       Patient Active Problem List   Diagnosis   • Seasonal allergic rhinitis due to pollen   • Antral gastritis   • Coronel's esophagus without dysplasia   • Recurrent major depressive disorder, in partial remission (CMS/HCC)   • Elevated liver function tests   • Other emphysema (CMS/HCC)   • Essential hypertension   • Chronic gout of multiple sites   • Osteoarthritis of both knees   • Primary insomnia   • Benign prostatic hyperplasia with urinary frequency   • Immunization deficiency   • Non-intractable vomiting with nausea   • Medicare annual wellness visit, subsequent   • Hyperamylasemia   • ELEAZAR (generalized anxiety disorder)   • Bronchitis   • Chronic cough   • Colon cancer screening   • Hyperglycemia   • Clinical diagnosis of COVID-19   • Localized edema    • Venous insufficiency       No Known Allergies      Current Outpatient Medications:   •  clonazePAM (KlonoPIN) 0.5 MG tablet, One a day prn anxiety.  Not to exceed 15 per month. (Patient taking differently: One a day prn anxiety.  Not to exceed 15 per month. NO LONGER TAKING), Disp: 15 tablet, Rfl: 3  •  fluticasone (FLONASE) 50 MCG/ACT nasal spray, 1 spray into the nostril(s) as directed by provider Daily., Disp: 16 g, Rfl: 5  •  ibuprofen (ADVIL,MOTRIN) 600 MG tablet, Take 600 mg by mouth Daily As Needed for Mild Pain ., Disp: , Rfl:   •  metoprolol tartrate (LOPRESSOR) 50 MG tablet, Take 1 tablet by mouth 2 (Two) Times a Day., Disp: 180 tablet, Rfl: 3  •  montelukast (Singulair) 10 MG tablet, Take 1 tablet by mouth Every Night., Disp: 90 tablet, Rfl: 3  •  pantoprazole (PROTONIX) 40 MG EC tablet, Take 1 tablet by mouth 2 (Two) Times a Day., Disp: 180 tablet, Rfl: 3  •  spironolactone (Aldactone) 25 MG tablet, Take 1 tablet by mouth 2 (Two) Times a Day., Disp: 180 tablet, Rfl: 3    Past Medical History:   Diagnosis Date   • Allergic rhinitis    • Antral gastritis    • Coronel's esophagus    • Calcaneal spur    • COVID-19     January 2021; had infusion   • Depression with anxiety    • Elevated liver function tests    • Emphysema lung (CMS/HCC)    • Enlarged aorta (CMS/HCC)     no treatment needed per patient   • GERD (gastroesophageal reflux disease)    • Gout    • Hypertension    • Inability to attain erection    • Influenza    • Knee osteoarthritis    • Osteoarthritis    • Osteoporosis    • Primary insomnia    • Urinary frequency        Past Surgical History:   Procedure Laterality Date   • COLONOSCOPY  2010   • COLONOSCOPY W/ POLYPECTOMY N/A 12/17/2020    Procedure: COLONOSCOPY TO CECUM WITH COLD AND HOT SNARE POLYPECTOMIES AND COLD BIOPSY POLYPECTOMY;  Surgeon: Vincent Man MD;  Location: [facility] ENDOSCOPY;  Service: Gastroenterology;  Laterality: N/A;  PRE: SCREENING  POST: POLYPS, DIVERTICULOSIS    • ENDOSCOPY N/A 2020    Procedure: ESOPHAGOGASTRODUODENOSCOPY WITH COLD BIOPSIES;  Surgeon: Vincent Man MD;  Location:  [facility] ENDOSCOPY;  Service: Gastroenterology;  Laterality: N/A;  PRE: HX BARRETTS ESOPHAGUS  POST: BARRETTS ESOPHAGUS, GASTRIC POLYPS, HIATAL HERNIA, GASTRITIS, DUODENAL LYPOMA   • ENDOSCOPY N/A 4/15/2021    Procedure: ESOPHAGOGASTRODUODENOSCOPY WITH BIOPSIES;  Surgeon: Vincent Man MD;  Location:  [facility] ENDOSCOPY;  Service: Gastroenterology;  Laterality: N/A;  PRE OP - H/O SALDANA'S  POST OP -   • EYE SURGERY      CATARACTS   • HERNIA REPAIR     • KNEE ARTHROSCOPY         Family History   Problem Relation Age of Onset   • Heart disease Mother    • Malig Hyperthermia Neg Hx        Social History     Tobacco Use   • Smoking status: Former Smoker     Types: Cigarettes, Cigars     Quit date: 1970     Years since quittin.1   • Smokeless tobacco: Never Used   • Tobacco comment: still smokes cigars occasionally    Substance Use Topics   • Alcohol use: Yes     Alcohol/week: 10.0 standard drinks     Types: 10 Shots of liquor per week     Comment: per week            Objective     Vitals:    21 0817   BP: 128/86   Pulse: 74   Temp: 98.2 °F (36.8 °C)   SpO2: 98%     Body mass index is 37.45 kg/m².    Physical Exam  Cardiovascular:      Rate and Rhythm: Normal rate.      Heart sounds: Normal heart sounds.      Comments: Trace edema bilaterally in legs distal to knees.        Lab Results   Component Value Date    BUN 8 05/10/2021    CREATININE 0.87 05/10/2021    EGFRIFNONA 87 05/10/2021    EGFRIFAFRI 106 05/10/2021    BCR 9.2 05/10/2021    K 3.7 05/10/2021    CO2 27.6 05/10/2021    CALCIUM 9.4 05/10/2021    PROTENTOTREF 6.9 05/10/2021    ALBUMIN 4.50 05/10/2021    LABIL2 1.9 05/10/2021    AST 38 05/10/2021    ALT 39 05/10/2021       WBC   Date Value Ref Range Status   2020 5.57 4.5 - 11.0 10*3/uL Final     RBC   Date Value Ref Range Status   2020 5.11 4.5  - 5.9 10*6/uL Final     Hemoglobin   Date Value Ref Range Status   03/16/2020 15.6 13.5 - 17.5 g/dL Final     Hematocrit   Date Value Ref Range Status   03/16/2020 44.8 41.0 - 53.0 % Final     MCV   Date Value Ref Range Status   03/16/2020 87.7 80.0 - 100.0 fL Final     MCH   Date Value Ref Range Status   03/16/2020 30.5 26.0 - 34.0 pg Final     MCHC   Date Value Ref Range Status   03/16/2020 34.8 31.0 - 37.0 g/dL Final     RDW   Date Value Ref Range Status   03/16/2020 15.4 12.0 - 16.8 % Final     MPV   Date Value Ref Range Status   03/16/2020 8.8 6.7 - 10.8 fL Final     Platelets   Date Value Ref Range Status   03/16/2020 164 140 - 440 10*3/uL Final     Neutrophil Rel %   Date Value Ref Range Status   03/16/2020 70.7 45 - 80 % Final     Lymphocyte Rel %   Date Value Ref Range Status   03/16/2020 17.2 15 - 50 % Final     Monocyte Rel %   Date Value Ref Range Status   03/16/2020 8.1 0 - 15 % Final     Eosinophil %   Date Value Ref Range Status   03/16/2020 2.9 0 - 7 % Final     Basophil Rel %   Date Value Ref Range Status   03/16/2020 0.7 0 - 2 % Final     Immature Grans %   Date Value Ref Range Status   03/16/2020 0.4 (H) 0 % Final     Neutrophils Absolute   Date Value Ref Range Status   03/16/2020 3.94 2.0 - 8.8 10*3/uL Final     Lymphocytes Absolute   Date Value Ref Range Status   03/16/2020 0.96 0.7 - 5.5 10*3/uL Final     Monocytes Absolute   Date Value Ref Range Status   03/16/2020 0.45 0.0 - 1.7 10*3/uL Final     Eosinophils Absolute   Date Value Ref Range Status   03/16/2020 0.16 0.0 - 0.8 10*3/uL Final     Basophils Absolute   Date Value Ref Range Status   03/16/2020 0.04 0.0 - 0.2 10*3/uL Final     Immature Grans, Absolute   Date Value Ref Range Status   03/16/2020 0.02 <1 10*3/uL Final     nRBC   Date Value Ref Range Status   03/16/2020 0 0 /100(WBC) Final       Lab Results   Component Value Date    HGBA1C 4.50 (L) 05/10/2021       No results found for: [id number]    TSH   Date Value Ref Range Status    07/30/2019 2.850 0.270 - 4.200 mIU/mL Final       No results found for: CHOL  Lab Results   Component Value Date    TRIG 68 05/10/2021     Lab Results   Component Value Date    HDL 81 (H) 05/10/2021     Lab Results   Component Value Date    LDL 35 05/10/2021     Lab Results   Component Value Date    VLDL 14 05/10/2021     No results found for: LDLHDL      Procedures    Assessment/Plan   Problems Addressed this Visit     Essential hypertension    Relevant Medications    spironolactone (Aldactone) 25 MG tablet    Localized edema    Relevant Medications    spironolactone (Aldactone) 25 MG tablet    Venous insufficiency - Primary      Diagnoses       Codes Comments    Venous insufficiency    -  Primary ICD-10-CM: I87.2  ICD-9-CM: 459.81     Localized edema     ICD-10-CM: R60.0  ICD-9-CM: 782.3     Essential hypertension     ICD-10-CM: I10  ICD-9-CM: 401.9       Venous insuff.  Uncontrolled.  Increase spironolactone 25 BID.  B compression stockings.    HTN.  Controlled.  Continue meds.      No orders of the defined types were placed in this encounter.      Current Outpatient Medications   Medication Sig Dispense Refill   • clonazePAM (KlonoPIN) 0.5 MG tablet One a day prn anxiety.  Not to exceed 15 per month. (Patient taking differently: One a day prn anxiety.  Not to exceed 15 per month. NO LONGER TAKING) 15 tablet 3   • fluticasone (FLONASE) 50 MCG/ACT nasal spray 1 spray into the nostril(s) as directed by provider Daily. 16 g 5   • ibuprofen (ADVIL,MOTRIN) 600 MG tablet Take 600 mg by mouth Daily As Needed for Mild Pain .     • metoprolol tartrate (LOPRESSOR) 50 MG tablet Take 1 tablet by mouth 2 (Two) Times a Day. 180 tablet 3   • montelukast (Singulair) 10 MG tablet Take 1 tablet by mouth Every Night. 90 tablet 3   • pantoprazole (PROTONIX) 40 MG EC tablet Take 1 tablet by mouth 2 (Two) Times a Day. 180 tablet 3   • spironolactone (Aldactone) 25 MG tablet Take 1 tablet by mouth 2 (Two) Times a Day. 180 tablet 3      No current facility-administered medications for this visit.       Álvaro Jacques had no medications administered during this visit.    Return in about 3 months (around 12/2/2021).    There are no Patient Instructions on file for this visit.

## 2021-09-14 DIAGNOSIS — J30.1 SEASONAL ALLERGIC RHINITIS DUE TO POLLEN: ICD-10-CM

## 2021-09-14 DIAGNOSIS — R05.3 CHRONIC COUGH: ICD-10-CM

## 2021-09-14 RX ORDER — MONTELUKAST SODIUM 10 MG/1
10 TABLET ORAL NIGHTLY
Qty: 90 TABLET | Refills: 3 | Status: SHIPPED | OUTPATIENT
Start: 2021-09-14 | End: 2022-09-18

## 2021-10-04 DIAGNOSIS — Z01.818 PREOPERATIVE CLEARANCE: ICD-10-CM

## 2021-10-04 DIAGNOSIS — I10 ESSENTIAL HYPERTENSION: Primary | ICD-10-CM

## 2021-10-12 ENCOUNTER — TELEPHONE (OUTPATIENT)
Dept: FAMILY MEDICINE CLINIC | Facility: CLINIC | Age: 71
End: 2021-10-12

## 2021-10-12 NOTE — TELEPHONE ENCOUNTER
.    Caller: Álvaro Jacques    Relationship: Self    Best call back number: [phone]    What form or medical record are you requesting: RECENT MEDICAL RECORDS FROM OFFICE TO INCLUDE MEDICATION LIST     Who is requesting this form or medical record from you:  CARDIO (DR NORMAN ZAPIEN) PHONE: [phone]    How would you like to receive the form or medical records (pick-up, mail, fax):   If fax, what is the fax number: [phone]      Timeframe paperwork needed: BY October 18TH 2021    Additional notes:

## 2021-10-19 ENCOUNTER — OFFICE VISIT (OUTPATIENT)
Dept: CARDIOLOGY | Facility: CLINIC | Age: 71
End: 2021-10-19

## 2021-10-19 VITALS
WEIGHT: 261.2 LBS | BODY MASS INDEX: 37.39 KG/M2 | SYSTOLIC BLOOD PRESSURE: 132 MMHG | HEIGHT: 70 IN | DIASTOLIC BLOOD PRESSURE: 70 MMHG | HEART RATE: 69 BPM

## 2021-10-19 DIAGNOSIS — I10 ESSENTIAL HYPERTENSION: ICD-10-CM

## 2021-10-19 DIAGNOSIS — R06.83 SNORING: ICD-10-CM

## 2021-10-19 DIAGNOSIS — R94.31 ABNORMAL EKG: ICD-10-CM

## 2021-10-19 DIAGNOSIS — Z01.810 PREOPERATIVE CARDIOVASCULAR EXAMINATION: Primary | ICD-10-CM

## 2021-10-19 PROCEDURE — 93000 ELECTROCARDIOGRAM COMPLETE: CPT | Performed by: INTERNAL MEDICINE

## 2021-10-19 PROCEDURE — 99204 OFFICE O/P NEW MOD 45 MIN: CPT | Performed by: INTERNAL MEDICINE

## 2021-10-19 NOTE — PROGRESS NOTES
Date of Office Visit: 10/19/2021  Encounter Provider: Cathie La MD  Place of Service: [facility] CARDIOLOGY  Patient Name: Álvaro Jacques  :1950      Patient ID:  Álvaro Jacques is a 71 y.o. male is here for preoperative clearance.            History of Present Illness    He is scheduled for a right meniscus surgery and was told his EKG is abnormal.  He is here for preoperative clearance.  He is scheduled for the surgery with Dr. Bah on 21.    He has history of GERD, COPD, hypertension.    His half brothers had strokes his mother heart failure.    He is  and has 2 biologic children and 2 stepchildren, he is retired, has a history of smoking, has a couple of bourbons daily and 2 cups of coffee daily.  He does not use drugs.    Echocardiogram done 3/1/2017 shows ejection fraction 50-55% with small pericardial effusion, mild dilation of the aortic root and mild tricuspid insufficiency.  Cardiac catheterization done 2017 showed normal left main, separate origin of the LAD and circumflex and the aortic root and they were normal, nondominant right coronary artery not selectively engaged.  CT of the chest done 2017 showed no aortic dissection, no thoracic or suprarenal abdominal aortic aneurysm, mild to moderate emphysema, colonic diverticulosis without acute diverticulitis and small cysts in the liver.  EGD done for chest pain on 2017 showed Coronel's esophagus with mild to moderate prepyloric antral erosive gastritis, normal duodenum.    Labs done 2021 show normal BMP and CBC.      He has no exertional chest tightness or pressure.  He has some pinprick left-sided chest pain if he lies on his left side which gets better when he rolls over.  He snores heavily but is never been tested for sleep apnea.  He has had no palpitations, tachycardia or syncope.  He has no orthopnea or PND.  He has had a chronically abnormal EKG for a while.  He does  not exercise regularly because of his knee pain.  He has no fevers, chills or cough.  He has no change in exertional dyspnea but has some mild chronic exertional dyspnea.  He has some mild chronic lower extremity edema which is better with spironolactone.  He takes his medications as directed without difficulty.    Past Medical History:   Diagnosis Date   • Allergic rhinitis    • Antral gastritis    • Saldana's esophagus    • Calcaneal spur    • COVID-19 January 2021; had infusion   • Depression with anxiety    • Elevated liver function tests    • Emphysema lung (HCC)    • Enlarged aorta (HCC)     no treatment needed per patient   • GERD (gastroesophageal reflux disease)    • Gout    • Hypertension    • Inability to attain erection    • Influenza    • Knee osteoarthritis    • Osteoarthritis    • Osteoporosis    • Primary insomnia    • Urinary frequency          Past Surgical History:   Procedure Laterality Date   • COLONOSCOPY  2010   • COLONOSCOPY W/ POLYPECTOMY N/A 12/17/2020    Procedure: COLONOSCOPY TO CECUM WITH COLD AND HOT SNARE POLYPECTOMIES AND COLD BIOPSY POLYPECTOMY;  Surgeon: Vincent Man MD;  Location: [facility] ENDOSCOPY;  Service: Gastroenterology;  Laterality: N/A;  PRE: SCREENING  POST: POLYPS, DIVERTICULOSIS   • ENDOSCOPY N/A 12/17/2020    Procedure: ESOPHAGOGASTRODUODENOSCOPY WITH COLD BIOPSIES;  Surgeon: Vincent Man MD;  Location: [facility] ENDOSCOPY;  Service: Gastroenterology;  Laterality: N/A;  PRE: HX BARRETTS ESOPHAGUS  POST: BARRETTS ESOPHAGUS, GASTRIC POLYPS, HIATAL HERNIA, GASTRITIS, DUODENAL LYPOMA   • ENDOSCOPY N/A 4/15/2021    Procedure: ESOPHAGOGASTRODUODENOSCOPY WITH BIOPSIES;  Surgeon: Vincent Man MD;  Location: [facility] ENDOSCOPY;  Service: Gastroenterology;  Laterality: N/A;  PRE OP - H/O SALDANA'S  POST OP -   • EYE SURGERY      CATARACTS   • HERNIA REPAIR     • KNEE ARTHROSCOPY         Current Outpatient Medications on File Prior to Visit   Medication  Sig Dispense Refill   • fluticasone (FLONASE) 50 MCG/ACT nasal spray 1 spray into the nostril(s) as directed by provider Daily. 16 g 5   • ibuprofen (ADVIL,MOTRIN) 600 MG tablet Take 600 mg by mouth Daily As Needed for Mild Pain .     • metoprolol tartrate (LOPRESSOR) 50 MG tablet Take 1 tablet by mouth 2 (Two) Times a Day. 180 tablet 3   • montelukast (SINGULAIR) 10 MG tablet TAKE 1 TABLET BY MOUTH EVERY NIGHT 90 tablet 3   • pantoprazole (PROTONIX) 40 MG EC tablet Take 1 tablet by mouth 2 (Two) Times a Day. 180 tablet 3   • spironolactone (Aldactone) 25 MG tablet Take 1 tablet by mouth 2 (Two) Times a Day. 180 tablet 3   • [DISCONTINUED] clonazePAM (KlonoPIN) 0.5 MG tablet One a day prn anxiety.  Not to exceed 15 per month. (Patient taking differently: One a day prn anxiety.  Not to exceed 15 per month. NO LONGER TAKING) 15 tablet 3     No current facility-administered medications on file prior to visit.       Social History     Socioeconomic History   • Marital status:    Tobacco Use   • Smoking status: Former Smoker     Types: Cigarettes, Cigars     Quit date: 1970     Years since quittin.2   • Smokeless tobacco: Never Used   • Tobacco comment: still smokes cigars occasionally    Vaping Use   • Vaping Use: Never used   Substance and Sexual Activity   • Alcohol use: Yes     Alcohol/week: 10.0 standard drinks     Types: 10 Shots of liquor per week     Comment: per week//Caffeine use: 2 cups daily    • Drug use: No   • Sexual activity: Yes           ROS    Procedures    ECG 12 Lead    Date/Time: 10/19/2021 8:04 AM  Performed by: Cathie La MD  Authorized by: Cathie La MD   Comparison: compared with previous ECG   Similar to previous ECG  Rhythm: sinus rhythm  T inversion: V3  T flattening: V2 and V4    Clinical impression: abnormal EKG                Objective:      Vitals:    10/19/21 0755 10/19/21 0802   BP: 128/70 132/70   BP Location: Left arm Right arm   Pulse: 69   "  Weight: 118 kg (261 lb 3.2 oz)    Height: 177.8 cm (70\")      Body mass index is 37.48 kg/m².    Vitals reviewed.   Constitutional:       General: Not in acute distress.     Appearance: Well-developed. Not diaphoretic.   Eyes:      General: No scleral icterus.     Conjunctiva/sclera: Conjunctivae normal.   HENT:      Head: Normocephalic and atraumatic.   Neck:      Thyroid: No thyromegaly.      Vascular: No carotid bruit or JVD.      Lymphadenopathy: No cervical adenopathy.   Pulmonary:      Effort: Pulmonary effort is normal. No respiratory distress.      Breath sounds: Normal breath sounds. No wheezing. No rhonchi. No rales.   Chest:      Chest wall: Not tender to palpatation.   Cardiovascular:      Normal rate. Regular rhythm.      Murmurs: There is no murmur.      No gallop.   Pulses:     Intact distal pulses.   Edema:     Peripheral edema absent.   Abdominal:      General: Bowel sounds are normal. There is no distension or abdominal bruit.      Palpations: Abdomen is soft. There is no abdominal mass.      Tenderness: There is no abdominal tenderness.   Musculoskeletal:         General: No deformity.      Extremities: No clubbing present.     Cervical back: Neck supple. Skin:     General: Skin is warm and dry. There is no cyanosis.      Coloration: Skin is not pale.      Findings: No rash.   Neurological:      Mental Status: Alert and oriented to person, place, and time.      Cranial Nerves: No cranial nerve deficit.   Psychiatric:         Judgment: Judgment normal.         Lab Review:       Assessment:      Diagnosis Plan   1. Preoperative cardiovascular examination  Adult Transthoracic Echo Complete W/ Cont if Necessary Per Protocol   2. Essential hypertension  Adult Transthoracic Echo Complete W/ Cont if Necessary Per Protocol    Ambulatory Referral to Sleep Medicine   3. Abnormal EKG  Adult Transthoracic Echo Complete W/ Cont if Necessary Per Protocol    Ambulatory Referral to Sleep Medicine   4. Snoring  " Ambulatory Referral to Sleep Medicine     1. Preoperative evaluation for right meniscal tear surgery.  2. Chronically abnormal ECG, no anginal chest pain  3. Essential hypertension, goal less than 120/80.  4. COPD, well controlled  5. Heavy snoring, evaluate for sleep apnea.  6. History of Coronel's esophagus and gastritis.  He follows with gastroenterology regularly but he does not remember who is his gastroenterologist.     Plan:       If his echocardiogram is normal, he may proceed with surgery.  If it is abnormal, he may need more of a cardiac work-up.  No medication changes today.  He will not need follow-up if his echo is normal.  We will keep you informed of the results of his studies.

## 2021-11-03 ENCOUNTER — TELEPHONE (OUTPATIENT)
Dept: CARDIOLOGY | Facility: CLINIC | Age: 71
End: 2021-11-03

## 2021-11-03 ENCOUNTER — HOSPITAL ENCOUNTER (OUTPATIENT)
Dept: CARDIOLOGY | Facility: HOSPITAL | Age: 71
Discharge: HOME | End: 2021-11-03
Admitting: INTERNAL MEDICINE

## 2021-11-03 DIAGNOSIS — I10 ESSENTIAL HYPERTENSION: ICD-10-CM

## 2021-11-03 DIAGNOSIS — Z01.810 PREOPERATIVE CARDIOVASCULAR EXAMINATION: ICD-10-CM

## 2021-11-03 DIAGNOSIS — R94.31 ABNORMAL EKG: ICD-10-CM

## 2021-11-03 LAB
ASCENDING AORTA: 4.1 CM
BH CV ECHO MEAS - ACS: 2 CM
BH CV ECHO MEAS - AO MAX PG (FULL): 0.89 MMHG
BH CV ECHO MEAS - AO MAX PG: 5.1 MMHG
BH CV ECHO MEAS - AO MEAN PG (FULL): 0.91 MMHG
BH CV ECHO MEAS - AO MEAN PG: 2.7 MMHG
BH CV ECHO MEAS - AO ROOT AREA (BSA CORRECTED): 1.9
BH CV ECHO MEAS - AO ROOT AREA: 14.7 CM^2
BH CV ECHO MEAS - AO ROOT DIAM: 4.3 CM
BH CV ECHO MEAS - AO V2 MAX: 113.1 CM/SEC
BH CV ECHO MEAS - AO V2 MEAN: 76 CM/SEC
BH CV ECHO MEAS - AO V2 VTI: 25.2 CM
BH CV ECHO MEAS - ASC AORTA: 4.1 CM
BH CV ECHO MEAS - AVA(I,A): 2.5 CM^2
BH CV ECHO MEAS - AVA(I,D): 2.5 CM^2
BH CV ECHO MEAS - AVA(V,A): 2.9 CM^2
BH CV ECHO MEAS - AVA(V,D): 2.9 CM^2
BH CV ECHO MEAS - BSA(HAYCOCK): 2.5 M^2
BH CV ECHO MEAS - BSA: 2.3 M^2
BH CV ECHO MEAS - BZI_BMI: 37.4 KILOGRAMS/M^2
BH CV ECHO MEAS - BZI_METRIC_HEIGHT: 177.8 CM
BH CV ECHO MEAS - BZI_METRIC_WEIGHT: 118.4 KG
BH CV ECHO MEAS - EDV(MOD-SP2): 105 ML
BH CV ECHO MEAS - EDV(MOD-SP4): 136 ML
BH CV ECHO MEAS - EDV(TEICH): 104.6 ML
BH CV ECHO MEAS - EF(CUBED): 70.1 %
BH CV ECHO MEAS - EF(MOD-BP): 66.2 %
BH CV ECHO MEAS - EF(MOD-SP2): 61.9 %
BH CV ECHO MEAS - EF(MOD-SP4): 68.4 %
BH CV ECHO MEAS - EF(TEICH): 61.6 %
BH CV ECHO MEAS - ESV(MOD-SP2): 40 ML
BH CV ECHO MEAS - ESV(MOD-SP4): 43 ML
BH CV ECHO MEAS - ESV(TEICH): 40.1 ML
BH CV ECHO MEAS - FS: 33.1 %
BH CV ECHO MEAS - IVS/LVPW: 1
BH CV ECHO MEAS - IVSD: 1 CM
BH CV ECHO MEAS - LAT PEAK E' VEL: 8.6 CM/SEC
BH CV ECHO MEAS - LV DIASTOLIC VOL/BSA (35-75): 58.2 ML/M^2
BH CV ECHO MEAS - LV MASS(C)D: 175.3 GRAMS
BH CV ECHO MEAS - LV MASS(C)DI: 75 GRAMS/M^2
BH CV ECHO MEAS - LV MAX PG: 4.2 MMHG
BH CV ECHO MEAS - LV MEAN PG: 1.8 MMHG
BH CV ECHO MEAS - LV SYSTOLIC VOL/BSA (12-30): 18.4 ML/M^2
BH CV ECHO MEAS - LV V1 MAX: 102.8 CM/SEC
BH CV ECHO MEAS - LV V1 MEAN: 59.3 CM/SEC
BH CV ECHO MEAS - LV V1 VTI: 19.8 CM
BH CV ECHO MEAS - LVIDD: 4.7 CM
BH CV ECHO MEAS - LVIDS: 3.2 CM
BH CV ECHO MEAS - LVLD AP2: 8.4 CM
BH CV ECHO MEAS - LVLD AP4: 8.7 CM
BH CV ECHO MEAS - LVLS AP2: 7.4 CM
BH CV ECHO MEAS - LVLS AP4: 7.2 CM
BH CV ECHO MEAS - LVOT AREA (M): 3.1 CM^2
BH CV ECHO MEAS - LVOT AREA: 3.2 CM^2
BH CV ECHO MEAS - LVOT DIAM: 2 CM
BH CV ECHO MEAS - LVPWD: 1 CM
BH CV ECHO MEAS - MED PEAK E' VEL: 5.9 CM/SEC
BH CV ECHO MEAS - MR MAX PG: 35.3 MMHG
BH CV ECHO MEAS - MR MAX VEL: 297.2 CM/SEC
BH CV ECHO MEAS - MV A DUR: 0.15 SEC
BH CV ECHO MEAS - MV A MAX VEL: 79.3 CM/SEC
BH CV ECHO MEAS - MV DEC SLOPE: 563.1 CM/SEC^2
BH CV ECHO MEAS - MV DEC TIME: 0.13 SEC
BH CV ECHO MEAS - MV E MAX VEL: 87.5 CM/SEC
BH CV ECHO MEAS - MV E/A: 1.1
BH CV ECHO MEAS - MV MAX PG: 5.2 MMHG
BH CV ECHO MEAS - MV MEAN PG: 2.1 MMHG
BH CV ECHO MEAS - MV P1/2T MAX VEL: 112.3 CM/SEC
BH CV ECHO MEAS - MV P1/2T: 58.4 MSEC
BH CV ECHO MEAS - MV V2 MAX: 114 CM/SEC
BH CV ECHO MEAS - MV V2 MEAN: 71.6 CM/SEC
BH CV ECHO MEAS - MV V2 VTI: 33 CM
BH CV ECHO MEAS - MVA P1/2T LCG: 2 CM^2
BH CV ECHO MEAS - MVA(P1/2T): 3.8 CM^2
BH CV ECHO MEAS - MVA(VTI): 1.9 CM^2
BH CV ECHO MEAS - PA ACC TIME: 0.12 SEC
BH CV ECHO MEAS - PA MAX PG (FULL): 2.5 MMHG
BH CV ECHO MEAS - PA MAX PG: 4 MMHG
BH CV ECHO MEAS - PA PR(ACCEL): 25.1 MMHG
BH CV ECHO MEAS - PA V2 MAX: 99.5 CM/SEC
BH CV ECHO MEAS - PULM A REVS DUR: 0.11 SEC
BH CV ECHO MEAS - PULM A REVS VEL: 21.9 CM/SEC
BH CV ECHO MEAS - PULM DIAS VEL: 23.4 CM/SEC
BH CV ECHO MEAS - PULM S/D: 1.3
BH CV ECHO MEAS - PULM SYS VEL: 29.9 CM/SEC
BH CV ECHO MEAS - PVA(V,A): 2.4 CM^2
BH CV ECHO MEAS - PVA(V,D): 2.4 CM^2
BH CV ECHO MEAS - QP/QS: 0.76
BH CV ECHO MEAS - RAP SYSTOLE: 3 MMHG
BH CV ECHO MEAS - RV MAX PG: 1.5 MMHG
BH CV ECHO MEAS - RV MEAN PG: 0.79 MMHG
BH CV ECHO MEAS - RV V1 MAX: 61.3 CM/SEC
BH CV ECHO MEAS - RV V1 MEAN: 41.6 CM/SEC
BH CV ECHO MEAS - RV V1 VTI: 12.3 CM
BH CV ECHO MEAS - RVOT AREA: 3.9 CM^2
BH CV ECHO MEAS - RVOT DIAM: 2.2 CM
BH CV ECHO MEAS - RVSP: 17 MMHG
BH CV ECHO MEAS - SI(AO): 158.7 ML/M^2
BH CV ECHO MEAS - SI(CUBED): 32 ML/M^2
BH CV ECHO MEAS - SI(LVOT): 27.3 ML/M^2
BH CV ECHO MEAS - SI(MOD-SP2): 27.8 ML/M^2
BH CV ECHO MEAS - SI(MOD-SP4): 39.8 ML/M^2
BH CV ECHO MEAS - SI(TEICH): 27.6 ML/M^2
BH CV ECHO MEAS - SV(AO): 370.9 ML
BH CV ECHO MEAS - SV(CUBED): 74.8 ML
BH CV ECHO MEAS - SV(LVOT): 63.8 ML
BH CV ECHO MEAS - SV(MOD-SP2): 65 ML
BH CV ECHO MEAS - SV(MOD-SP4): 93 ML
BH CV ECHO MEAS - SV(RVOT): 48.5 ML
BH CV ECHO MEAS - SV(TEICH): 64.5 ML
BH CV ECHO MEAS - TAPSE (>1.6): 2.1 CM
BH CV ECHO MEAS - TR MAX VEL: 187 CM/SEC
BH CV ECHO MEASUREMENTS AVERAGE E/E' RATIO: 12.07
BH CV XLRA - RV BASE: 4.2 CM
BH CV XLRA - RV LENGTH: 7.8 CM
BH CV XLRA - RV MID: 3.1 CM
BH CV XLRA - TDI S': 6.7 CM/SEC
LEFT ATRIUM VOLUME INDEX: 33 ML/M2
LV EF 2D ECHO EST: 66 %
MAXIMAL PREDICTED HEART RATE: 151 BPM
SINUS: 3.9 CM
STJ: 3.6 CM
STRESS TARGET HR: 128 BPM

## 2021-11-03 PROCEDURE — 25010000002 PERFLUTREN (DEFINITY) 8.476 MG IN SODIUM CHLORIDE (PF) 0.9 % 10 ML INJECTION: Performed by: INTERNAL MEDICINE

## 2021-11-03 PROCEDURE — 93306 TTE W/DOPPLER COMPLETE: CPT

## 2021-11-03 PROCEDURE — 93306 TTE W/DOPPLER COMPLETE: CPT | Performed by: INTERNAL MEDICINE

## 2021-11-03 RX ADMIN — PERFLUTREN 1.5 ML: 6.52 INJECTION, SUSPENSION INTRAVENOUS at 10:34

## 2021-11-03 NOTE — TELEPHONE ENCOUNTER
I called and gave echo results - had normal CT chest done 9/2020 without aortic dilation - no changes.     No further testing needed - he is clear for surgery with Dr. kureger    rm

## 2021-11-16 ENCOUNTER — CLINICAL SUPPORT (OUTPATIENT)
Dept: FAMILY MEDICINE CLINIC | Facility: CLINIC | Age: 71
End: 2021-11-16

## 2021-11-16 DIAGNOSIS — Z23 NEED FOR COVID-19 VACCINE: Primary | ICD-10-CM

## 2021-11-16 PROCEDURE — 0002A COVID-19 (PFIZER): CPT | Performed by: FAMILY MEDICINE

## 2021-11-16 PROCEDURE — 91300 COVID-19 (PFIZER): CPT | Performed by: FAMILY MEDICINE

## 2021-11-30 ENCOUNTER — OFFICE VISIT (OUTPATIENT)
Dept: SLEEP MEDICINE | Facility: HOSPITAL | Age: 71
End: 2021-11-30

## 2021-11-30 VITALS
WEIGHT: 260 LBS | DIASTOLIC BLOOD PRESSURE: 71 MMHG | HEART RATE: 72 BPM | OXYGEN SATURATION: 97 % | BODY MASS INDEX: 37.22 KG/M2 | HEIGHT: 70 IN | SYSTOLIC BLOOD PRESSURE: 112 MMHG

## 2021-11-30 DIAGNOSIS — R06.83 SNORING: ICD-10-CM

## 2021-11-30 DIAGNOSIS — I10 ESSENTIAL HYPERTENSION: ICD-10-CM

## 2021-11-30 DIAGNOSIS — R94.31 ABNORMAL EKG: ICD-10-CM

## 2021-11-30 DIAGNOSIS — G47.33 OSA (OBSTRUCTIVE SLEEP APNEA): Primary | ICD-10-CM

## 2021-11-30 PROCEDURE — G0463 HOSPITAL OUTPT CLINIC VISIT: HCPCS

## 2021-11-30 NOTE — PROGRESS NOTES
[facility]      Patient Care Team:  Corey Martínez MD as PCP - General (Family Medicine)    Referring Provider: Cathie La MD    Chief complaint:   Referred for sleep apnea evaluation    History of present illness:    Subjective   This is a 71 year old male patient with hx of HTN, GERD/Rhys's esophagus and COPD (followed by PCP). He's not on inhaler therapy.     He reported loud snoring which sometimes awakens him and it disturbs his wife who nudges him few times a week so he would change position. Snoring occurs in all position. He also reported waking choking occassionally. His wife has witnessed him stopping breathing.     Sleep schedule:  -Bedtime: 9-10 PM  -Sleep latency: 20-30 min  -Wake up time: 6 AM , does feel refreshed  -Nocturnal awakenin-5 times because of nocturia. He attributed to LAsix which he takes in the morning and after lunch.  No difficulties going back to sleep.  -Perceived sleep hours: 8-9       ESS: Total score: 7     Review of Systems  Constitutional: No fever or chills. No changes in appetite.   ENMT: No sinus congestion, postnasal drip, hoarsness  Cardiovascular: No chest pain, palpitation or legs swelling.    Respiratory: No dyspnea, cough or wheezing.  Gastrointestinal: No constipation, diarrhea, abdominal pain or acid reflux  Neurology: No headache, weakness, numbness or dizziness.   Musculoskeletal: No joints pain, stiffness or swelling.   Psychiatry: No depression, anxiety or irritability.   Hem/Lymphatic: No swollen glands or easy bruising.  Integumentary: No rash.  Endocrinology: No excessive thirst, cold or warm intolerance.   Urinary: No dysuria, bloody urine or frequent urination.     History  Past Medical History:   Diagnosis Date   • Allergic rhinitis    • Antral gastritis    • Coronel's esophagus    • Calcaneal spur    • COVID-19     2021; had infusion   • Depression with anxiety    •  Elevated liver function tests    • Emphysema lung (HCC)    • Enlarged aorta (HCC)     no treatment needed per patient   • GERD (gastroesophageal reflux disease)    • Gout    • Hypertension    • Inability to attain erection    • Influenza    • Knee osteoarthritis    • Osteoarthritis    • Osteoporosis    • Primary insomnia    • Urinary frequency    ,   Past Surgical History:   Procedure Laterality Date   • COLONOSCOPY     • COLONOSCOPY W/ POLYPECTOMY N/A 2020    Procedure: COLONOSCOPY TO CECUM WITH COLD AND HOT SNARE POLYPECTOMIES AND COLD BIOPSY POLYPECTOMY;  Surgeon: Vincent Man MD;  Location: [facility] ENDOSCOPY;  Service: Gastroenterology;  Laterality: N/A;  PRE: SCREENING  POST: POLYPS, DIVERTICULOSIS   • ENDOSCOPY N/A 2020    Procedure: ESOPHAGOGASTRODUODENOSCOPY WITH COLD BIOPSIES;  Surgeon: Vincent Man MD;  Location: [facility] ENDOSCOPY;  Service: Gastroenterology;  Laterality: N/A;  PRE: HX BARRETTS ESOPHAGUS  POST: BARRETTS ESOPHAGUS, GASTRIC POLYPS, HIATAL HERNIA, GASTRITIS, DUODENAL LYPOMA   • ENDOSCOPY N/A 4/15/2021    Procedure: ESOPHAGOGASTRODUODENOSCOPY WITH BIOPSIES;  Surgeon: Vincent Man MD;  Location: [facility] ENDOSCOPY;  Service: Gastroenterology;  Laterality: N/A;  PRE OP - H/O SALDANA'S  POST OP -   • EYE SURGERY      CATARACTS   • HERNIA REPAIR     • KNEE ARTHROSCOPY     ,   Family History   Problem Relation Age of Onset   • Heart disease Mother    • Heart failure Mother    • Malig Hyperthermia Neg Hx    ,   Social History     Socioeconomic History   • Marital status:    Tobacco Use   • Smoking status: Former Smoker     Types: Cigarettes, Cigars     Quit date: 1970     Years since quittin.3   • Smokeless tobacco: Never Used   • Tobacco comment: still smokes cigars occasionally    Vaping Use   • Vaping Use: Never used   Substance and Sexual Activity   • Alcohol use: Yes     Alcohol/week: 10.0 standard drinks     Types: 10 Shots of liquor  "per week     Comment: per week//Caffeine use: 2 cups daily    • Drug use: No   • Sexual activity: Yes     E-cigarette/Vaping   • E-cigarette/Vaping Use Never User      E-cigarette/Vaping Substances     E-cigarette/Vaping Devices        and Allergies:  Patient has no known allergies.    Medications:    Current Outpatient Medications:   •  fluticasone (FLONASE) 50 MCG/ACT nasal spray, 1 spray into the nostril(s) as directed by provider Daily., Disp: 16 g, Rfl: 5  •  ibuprofen (ADVIL,MOTRIN) 600 MG tablet, Take 600 mg by mouth Daily As Needed for Mild Pain ., Disp: , Rfl:   •  metoprolol tartrate (LOPRESSOR) 50 MG tablet, Take 1 tablet by mouth 2 (Two) Times a Day., Disp: 180 tablet, Rfl: 3  •  montelukast (SINGULAIR) 10 MG tablet, TAKE 1 TABLET BY MOUTH EVERY NIGHT, Disp: 90 tablet, Rfl: 3  •  pantoprazole (PROTONIX) 40 MG EC tablet, Take 1 tablet by mouth 2 (Two) Times a Day., Disp: 180 tablet, Rfl: 3  •  spironolactone (Aldactone) 25 MG tablet, Take 1 tablet by mouth 2 (Two) Times a Day., Disp: 180 tablet, Rfl: 3      Objective   Vital Signs:  Vitals:    11/30/21 1056   BP: 112/71   Pulse: 72   SpO2: 97%   Weight: 118 kg (260 lb)   Height: 178 cm (70.08\")     Body mass index is 37.22 kg/m².  Neck Circumference: 20 inches     Physical Exam:  Neck Circumference: 20 inches     Constitutional: Not in acute distress.  Eyes: Injected conjunctiva, EOMI. pupils equal reactive to light.  ENMT: Duong score 4. Mallampati score 4. No oral thrush. Tonsils grade 1. Narrow distance in between the posterior pharyngeal pillars (<25 %). Slightly scalloped tongue.  Neck: Large. No thyromegaly.  Trachea midline.  Heart: Regular rhythm and rate, no murmur  Lungs: Good and equal air entry bilaterally. No crackles or wheezing.  Nonlabored breathing.       Abdomen: Obese.  Soft.  No tenderness.  Positive bowel sounds.  Extremities: No cyanosis, clubbing or pitting edema.  Warm extremities and well-perfused.  Neuro: Conscious, alert, " oriented x3.  Gait is normal.  Strength 5/5 in arms and legs.  Psych: Appropriate mood and affect.    Integumentary: No rash.  Normal skin turgor.  Lymphatic: No palpable cervical or supraclavicular lymph nodes.    Diagnostic data:  Echocardiogram: 11/3/21:  EF=66%; dilation of aortic root.     Assessment   1. Snoring, probable sleep apnea  2. Obesity, BMI 37  3. HTN  4. Abnormal echocardiogram: Dilated aortic root.       Plan:  Check HST. We discussed the pathophysiology of sleep apnea, testing and therapy which include CPAP and weight loss.  Patient is agreeable with CPAP therapy if needed.     Counseled for weight loss.  Encouraged to exercise regularly and cut down on carbohydrates.  Discussed that losing weight may decrease the severity of sleep apnea and obviate the need of CPAP therapy.    Discussed the association between obstructive sleep apnea and cardiovascular disease including HTN and the beneficial effect of Pap therapy in reducing the risk of major cardiovascular events.          Lokesh Whitehead MD  11/30/21  11:16 EST    This note was dictated utilizing Dragon dictation

## 2021-12-02 ENCOUNTER — OFFICE VISIT (OUTPATIENT)
Dept: FAMILY MEDICINE CLINIC | Facility: CLINIC | Age: 71
End: 2021-12-02

## 2021-12-02 VITALS
HEART RATE: 69 BPM | HEIGHT: 70 IN | OXYGEN SATURATION: 99 % | DIASTOLIC BLOOD PRESSURE: 70 MMHG | BODY MASS INDEX: 37.08 KG/M2 | TEMPERATURE: 97.1 F | SYSTOLIC BLOOD PRESSURE: 112 MMHG | WEIGHT: 259 LBS

## 2021-12-02 DIAGNOSIS — R60.0 LOCALIZED EDEMA: ICD-10-CM

## 2021-12-02 DIAGNOSIS — E66.9 OBESITY (BMI 30-39.9): Primary | ICD-10-CM

## 2021-12-02 DIAGNOSIS — I10 ESSENTIAL HYPERTENSION: ICD-10-CM

## 2021-12-02 DIAGNOSIS — K22.70 BARRETT'S ESOPHAGUS WITHOUT DYSPLASIA: ICD-10-CM

## 2021-12-02 PROCEDURE — 99214 OFFICE O/P EST MOD 30 MIN: CPT | Performed by: FAMILY MEDICINE

## 2021-12-02 RX ORDER — HYDROCODONE BITARTRATE AND ACETAMINOPHEN 5; 325 MG/1; MG/1
1 TABLET ORAL EVERY 6 HOURS PRN
COMMUNITY
Start: 2021-11-08 | End: 2022-10-20

## 2021-12-02 RX ORDER — PANTOPRAZOLE SODIUM 40 MG/1
40 TABLET, DELAYED RELEASE ORAL 2 TIMES DAILY
Qty: 180 TABLET | Refills: 3 | Status: SHIPPED | OUTPATIENT
Start: 2021-12-02 | End: 2022-12-18 | Stop reason: SDUPTHER

## 2021-12-02 RX ORDER — METOPROLOL TARTRATE 50 MG/1
50 TABLET, FILM COATED ORAL 2 TIMES DAILY
Qty: 180 TABLET | Refills: 3 | Status: SHIPPED | OUTPATIENT
Start: 2021-12-02 | End: 2022-12-02

## 2021-12-02 RX ORDER — CETIRIZINE HYDROCHLORIDE 5 MG/1
5 TABLET ORAL DAILY
COMMUNITY

## 2021-12-02 RX ORDER — SPIRONOLACTONE 25 MG/1
25 TABLET ORAL 2 TIMES DAILY
Qty: 180 TABLET | Refills: 3 | Status: SHIPPED | OUTPATIENT
Start: 2021-12-02 | End: 2022-12-18 | Stop reason: SDUPTHER

## 2021-12-02 NOTE — PROGRESS NOTES
Chief Complaint   Patient presents with   • Hypertension       Subjective   Álvaro Jacques is a 71 y.o. male.     History of Present Illness   F/U HTN.  Doing well with meds.    F/u obesity with BMI 37.  No diet now.  Had R knee arthroscopy.    F/U ALLA with Coronel's esophagus.  Doing well now.  On meds regulalry.    The following portions of the patient's history were reviewed and updated as appropriate: allergies, current medications, past family history, past medical history, past social history, past surgical history and problem list.    Review of Systems   Constitutional: Negative for appetite change and fatigue.   HENT: Negative for nosebleeds and sore throat.    Eyes: Negative for blurred vision and visual disturbance.   Respiratory: Negative for shortness of breath and wheezing.    Cardiovascular: Negative for chest pain and leg swelling.   Gastrointestinal: Negative for abdominal distention and abdominal pain.   Endocrine: Negative for cold intolerance and polyuria.   Genitourinary: Negative for dysuria and hematuria.   Musculoskeletal: Negative for arthralgias and myalgias.   Skin: Negative for color change and rash.   Neurological: Negative for weakness and confusion.   Psychiatric/Behavioral: Negative for agitation and depressed mood.       Patient Active Problem List   Diagnosis   • Seasonal allergic rhinitis due to pollen   • Antral gastritis   • Coronel's esophagus without dysplasia   • Recurrent major depressive disorder, in partial remission (HCC)   • Elevated liver function tests   • Other emphysema (HCC)   • Essential hypertension   • Chronic gout of multiple sites   • Osteoarthritis of both knees   • Primary insomnia   • Benign prostatic hyperplasia with urinary frequency   • Immunization deficiency   • Non-intractable vomiting with nausea   • Medicare annual wellness visit, subsequent   • Hyperamylasemia   • ELEAZAR (generalized anxiety disorder)   • Bronchitis   • Chronic cough   • Colon cancer  screening   • Hyperglycemia   • Clinical diagnosis of COVID-19   • Localized edema   • Venous insufficiency   • Preoperative clearance   • Obesity (BMI 30-39.9)       No Known Allergies      Current Outpatient Medications:   •  cetirizine (zyrTEC) 5 MG tablet, Take 5 mg by mouth Daily., Disp: , Rfl:   •  fluticasone (FLONASE) 50 MCG/ACT nasal spray, 1 spray into the nostril(s) as directed by provider Daily., Disp: 16 g, Rfl: 5  •  HYDROcodone-acetaminophen (NORCO) 5-325 MG per tablet, Take 1 tablet by mouth Every 6 (Six) Hours As Needed., Disp: , Rfl:   •  ibuprofen (ADVIL,MOTRIN) 600 MG tablet, Take 600 mg by mouth Daily As Needed for Mild Pain ., Disp: , Rfl:   •  metoprolol tartrate (LOPRESSOR) 50 MG tablet, Take 1 tablet by mouth 2 (Two) Times a Day., Disp: 180 tablet, Rfl: 3  •  montelukast (SINGULAIR) 10 MG tablet, TAKE 1 TABLET BY MOUTH EVERY NIGHT, Disp: 90 tablet, Rfl: 3  •  pantoprazole (PROTONIX) 40 MG EC tablet, Take 1 tablet by mouth 2 (Two) Times a Day., Disp: 180 tablet, Rfl: 3  •  spironolactone (Aldactone) 25 MG tablet, Take 1 tablet by mouth 2 (Two) Times a Day., Disp: 180 tablet, Rfl: 3    Past Medical History:   Diagnosis Date   • Allergic    • Allergic rhinitis    • Antral gastritis    • Coronel's esophagus    • Calcaneal spur    • COVID-19     January 2021; had infusion   • Depression with anxiety    • Elevated liver function tests    • Emphysema lung (HCC)    • Enlarged aorta (HCC)     no treatment needed per patient   • GERD (gastroesophageal reflux disease)    • Gout    • Hypertension    • Inability to attain erection    • Influenza    • Knee osteoarthritis    • Osteoarthritis    • Osteoporosis    • Primary insomnia    • Urinary frequency        Past Surgical History:   Procedure Laterality Date   • COLONOSCOPY  2010   • COLONOSCOPY W/ POLYPECTOMY N/A 12/17/2020    Procedure: COLONOSCOPY TO CECUM WITH COLD AND HOT SNARE POLYPECTOMIES AND COLD BIOPSY POLYPECTOMY;  Surgeon: Vincent Man  MD Joe;  Location: [facility] ENDOSCOPY;  Service: Gastroenterology;  Laterality: N/A;  PRE: SCREENING  POST: POLYPS, DIVERTICULOSIS   • ENDOSCOPY N/A 2020    Procedure: ESOPHAGOGASTRODUODENOSCOPY WITH COLD BIOPSIES;  Surgeon: Vincent Man MD;  Location: [facility] ENDOSCOPY;  Service: Gastroenterology;  Laterality: N/A;  PRE: HX BARRETTS ESOPHAGUS  POST: BARRETTS ESOPHAGUS, GASTRIC POLYPS, HIATAL HERNIA, GASTRITIS, DUODENAL LYPOMA   • ENDOSCOPY N/A 4/15/2021    Procedure: ESOPHAGOGASTRODUODENOSCOPY WITH BIOPSIES;  Surgeon: Vincent Man MD;  Location: [facility] ENDOSCOPY;  Service: Gastroenterology;  Laterality: N/A;  PRE OP - H/O SALDANA'S  POST OP -   • EYE SURGERY      CATARACTS   • HERNIA REPAIR     • KNEE ARTHROSCOPY     • KNEE CARTILAGE SURGERY Right 2021       Family History   Problem Relation Age of Onset   • Heart disease Mother    • Heart failure Mother    • Malig Hyperthermia Neg Hx        Social History     Tobacco Use   • Smoking status: Former Smoker     Packs/day: 0.00     Years: 0.00     Pack years: 0.00     Types: Cigarettes, Cigars     Quit date: 1970     Years since quittin.3   • Smokeless tobacco: Never Used   • Tobacco comment: still smokes cigars occasionally    Substance Use Topics   • Alcohol use: Yes     Alcohol/week: 10.0 standard drinks     Types: 10 Shots of liquor per week     Comment: per week//Caffeine use: 2 cups daily             Objective     Vitals:    21 0914   BP: 112/70   Pulse: 69   Temp: 97.1 °F (36.2 °C)   SpO2: 99%     Body mass index is 37.16 kg/m².    Physical Exam  Vitals reviewed.   Constitutional:       Appearance: He is well-developed. He is not diaphoretic.   HENT:      Head: Normocephalic and atraumatic.   Eyes:      General: No scleral icterus.     Pupils: Pupils are equal, round, and reactive to light.   Neck:      Thyroid: No thyromegaly.   Cardiovascular:      Rate and Rhythm: Normal rate and regular rhythm.      Heart  sounds: No murmur heard.  No friction rub. No gallop.    Pulmonary:      Effort: Pulmonary effort is normal. No respiratory distress.      Breath sounds: No wheezing or rales.   Chest:      Chest wall: No tenderness.   Abdominal:      General: Bowel sounds are normal. There is no distension.      Palpations: Abdomen is soft.      Tenderness: There is no abdominal tenderness.   Musculoskeletal:         General: No deformity. Normal range of motion.   Lymphadenopathy:      Cervical: No cervical adenopathy.   Skin:     General: Skin is warm and dry.      Findings: No rash.   Neurological:      Cranial Nerves: No cranial nerve deficit.      Motor: No abnormal muscle tone.         Lab Results   Component Value Date    GLUCOSE 90 05/10/2021    BUN 8 05/10/2021    CREATININE 0.87 05/10/2021    EGFRIFNONA 87 05/10/2021    EGFRIFAFRI 106 05/10/2021    BCR 9.2 05/10/2021    K 4.0 11/08/2021    CO2 27.6 05/10/2021    CALCIUM 9.4 05/10/2021    PROTENTOTREF 6.9 05/10/2021    ALBUMIN 4.50 05/10/2021    LABIL2 1.9 05/10/2021    AST 38 05/10/2021    ALT 39 05/10/2021       WBC   Date Value Ref Range Status   09/29/2021 6.55 4.5 - 11.0 10*3/uL Final     RBC   Date Value Ref Range Status   09/29/2021 5.20 4.5 - 5.9 10*6/uL Final     Hemoglobin   Date Value Ref Range Status   09/29/2021 15.7 13.5 - 17.5 g/dL Final     Hematocrit   Date Value Ref Range Status   09/29/2021 44.3 41.0 - 53.0 % Final     MCV   Date Value Ref Range Status   09/29/2021 85.2 80.0 - 100.0 fL Final     MCH   Date Value Ref Range Status   09/29/2021 30.2 26.0 - 34.0 pg Final     MCHC   Date Value Ref Range Status   09/29/2021 35.4 31.0 - 37.0 g/dL Final     RDW   Date Value Ref Range Status   09/29/2021 15.3 12.0 - 16.8 % Final     MPV   Date Value Ref Range Status   09/29/2021 9.1 8.4 - 12.4 fL Final     Platelets   Date Value Ref Range Status   09/29/2021 176 140 - 440 10*3/uL Final     Neutrophil Rel %   Date Value Ref Range Status   09/29/2021 73.8 45 - 80 %  Final     Lymphocyte Rel %   Date Value Ref Range Status   09/29/2021 15.9 15 - 50 % Final     Monocyte Rel %   Date Value Ref Range Status   09/29/2021 7.3 0 - 15 % Final     Eosinophil %   Date Value Ref Range Status   09/29/2021 1.1 0 - 7 % Final     Basophil Rel %   Date Value Ref Range Status   09/29/2021 1.4 0 - 2 % Final     Immature Grans %   Date Value Ref Range Status   09/29/2021 0.5 0.0 - 1.0 % Final     Neutrophils Absolute   Date Value Ref Range Status   09/29/2021 4.84 2.0 - 8.8 10*3/uL Final     Lymphocytes Absolute   Date Value Ref Range Status   09/29/2021 1.04 0.7 - 5.5 10*3/uL Final     Monocytes Absolute   Date Value Ref Range Status   09/29/2021 0.48 0.0 - 1.7 10*3/uL Final     Eosinophils Absolute   Date Value Ref Range Status   09/29/2021 0.07 0.0 - 0.8 10*3/uL Final     Basophils Absolute   Date Value Ref Range Status   09/29/2021 0.09 0.0 - 0.2 10*3/uL Final     Immature Grans, Absolute   Date Value Ref Range Status   09/29/2021 0.03 0.00 - 0.10 10*3/uL Final     nRBC   Date Value Ref Range Status   09/29/2021 0 0 /100(WBC) Final       Lab Results   Component Value Date    HGBA1C 4.50 (L) 05/10/2021       No results found for: [id number]    TSH   Date Value Ref Range Status   07/30/2019 2.850 0.270 - 4.200 mIU/mL Final       No results found for: CHOL  Lab Results   Component Value Date    TRIG 68 05/10/2021     Lab Results   Component Value Date    HDL 81 (H) 05/10/2021     Lab Results   Component Value Date    LDL 35 05/10/2021     Lab Results   Component Value Date    VLDL 14 05/10/2021     No results found for: LDLHDL      Procedures    Assessment/Plan   Problems Addressed this Visit     Coronel's esophagus without dysplasia    Relevant Medications    pantoprazole (PROTONIX) 40 MG EC tablet    Essential hypertension    Relevant Medications    spironolactone (Aldactone) 25 MG tablet    metoprolol tartrate (LOPRESSOR) 50 MG tablet    Localized edema    Relevant Medications     spironolactone (Aldactone) 25 MG tablet    Obesity (BMI 30-39.9) - Primary      Diagnoses       Codes Comments    Obesity (BMI 30-39.9)    -  Primary ICD-10-CM: E66.9  ICD-9-CM: 278.00     Coronel's esophagus without dysplasia     ICD-10-CM: K22.70  ICD-9-CM: 530.85     Localized edema     ICD-10-CM: R60.0  ICD-9-CM: 782.3     Essential hypertension     ICD-10-CM: I10  ICD-9-CM: 401.9       HTN.  Controlled.  BMP normal 9/21.  Continue spironolactone and metoprolol.    Coronel's esophagus.  Doing well.  Refill PPI.    Obesity. BMI 37.  Start intermittent fasting.     Encouraged to get shingrix.      No orders of the defined types were placed in this encounter.      Current Outpatient Medications   Medication Sig Dispense Refill   • cetirizine (zyrTEC) 5 MG tablet Take 5 mg by mouth Daily.     • fluticasone (FLONASE) 50 MCG/ACT nasal spray 1 spray into the nostril(s) as directed by provider Daily. 16 g 5   • HYDROcodone-acetaminophen (NORCO) 5-325 MG per tablet Take 1 tablet by mouth Every 6 (Six) Hours As Needed.     • ibuprofen (ADVIL,MOTRIN) 600 MG tablet Take 600 mg by mouth Daily As Needed for Mild Pain .     • metoprolol tartrate (LOPRESSOR) 50 MG tablet Take 1 tablet by mouth 2 (Two) Times a Day. 180 tablet 3   • montelukast (SINGULAIR) 10 MG tablet TAKE 1 TABLET BY MOUTH EVERY NIGHT 90 tablet 3   • pantoprazole (PROTONIX) 40 MG EC tablet Take 1 tablet by mouth 2 (Two) Times a Day. 180 tablet 3   • spironolactone (Aldactone) 25 MG tablet Take 1 tablet by mouth 2 (Two) Times a Day. 180 tablet 3     No current facility-administered medications for this visit.       Álvaro Jacques had no medications administered during this visit.    Return in about 4 months (around 4/2/2022).    There are no Patient Instructions on file for this visit.

## 2022-04-21 ENCOUNTER — OFFICE VISIT (OUTPATIENT)
Dept: FAMILY MEDICINE CLINIC | Facility: CLINIC | Age: 72
End: 2022-04-21

## 2022-04-21 VITALS
SYSTOLIC BLOOD PRESSURE: 110 MMHG | TEMPERATURE: 97.5 F | DIASTOLIC BLOOD PRESSURE: 80 MMHG | OXYGEN SATURATION: 98 % | HEIGHT: 70 IN | WEIGHT: 259 LBS | BODY MASS INDEX: 37.08 KG/M2 | HEART RATE: 80 BPM

## 2022-04-21 DIAGNOSIS — K22.70 BARRETT'S ESOPHAGUS WITHOUT DYSPLASIA: ICD-10-CM

## 2022-04-21 DIAGNOSIS — Z11.59 ENCOUNTER FOR HEPATITIS C SCREENING TEST FOR LOW RISK PATIENT: ICD-10-CM

## 2022-04-21 DIAGNOSIS — I10 ESSENTIAL HYPERTENSION: ICD-10-CM

## 2022-04-21 DIAGNOSIS — M1A.09X0 CHRONIC GOUT OF MULTIPLE SITES, UNSPECIFIED CAUSE: ICD-10-CM

## 2022-04-21 DIAGNOSIS — R73.9 HYPERGLYCEMIA: ICD-10-CM

## 2022-04-21 DIAGNOSIS — Z00.00 MEDICARE ANNUAL WELLNESS VISIT, SUBSEQUENT: Primary | ICD-10-CM

## 2022-04-21 PROCEDURE — G0439 PPPS, SUBSEQ VISIT: HCPCS | Performed by: FAMILY MEDICINE

## 2022-04-21 PROCEDURE — 1159F MED LIST DOCD IN RCRD: CPT | Performed by: FAMILY MEDICINE

## 2022-04-21 PROCEDURE — 1170F FXNL STATUS ASSESSED: CPT | Performed by: FAMILY MEDICINE

## 2022-04-21 PROCEDURE — 99213 OFFICE O/P EST LOW 20 MIN: CPT | Performed by: FAMILY MEDICINE

## 2022-04-21 NOTE — PROGRESS NOTES
The ABCs of the Annual Wellness Visit  Subsequent Medicare Wellness Visit    Chief Complaint   Patient presents with   • Medicare Wellness-subsequent   f/U HTN.    Subjective    History of Present Illness:  Álvaro Jacques is a 71 y.o. male who presents for a Subsequent Medicare Wellness Visit.  Fu HTN. No orthostasis.  Doing well with meds.    F/U Coronel's esophagus.  Doing well with pantoprazole BID  The following portions of the patient's history were reviewed and   updated as appropriate: allergies, current medications, past family history, past medical history, past social history, past surgical history and problem list.    Compared to one year ago, the patient feels his physical   health is the same.    Compared to one year ago, the patient feels his mental   health is the same.    Recent Hospitalizations:  He was not admitted to the hospital during the last year.       Current Medical Providers:  Patient Care Team:  Corey Martínez MD as PCP - General (Family Medicine)    Outpatient Medications Prior to Visit   Medication Sig Dispense Refill   • cetirizine (zyrTEC) 5 MG tablet Take 5 mg by mouth Daily.     • fluticasone (FLONASE) 50 MCG/ACT nasal spray 1 spray into the nostril(s) as directed by provider Daily. 16 g 5   • HYDROcodone-acetaminophen (NORCO) 5-325 MG per tablet Take 1 tablet by mouth Every 6 (Six) Hours As Needed.     • ibuprofen (ADVIL,MOTRIN) 600 MG tablet Take 600 mg by mouth Daily As Needed for Mild Pain .     • metoprolol tartrate (LOPRESSOR) 50 MG tablet Take 1 tablet by mouth 2 (Two) Times a Day. 180 tablet 3   • montelukast (SINGULAIR) 10 MG tablet TAKE 1 TABLET BY MOUTH EVERY NIGHT 90 tablet 3   • pantoprazole (PROTONIX) 40 MG EC tablet Take 1 tablet by mouth 2 (Two) Times a Day. 180 tablet 3   • spironolactone (Aldactone) 25 MG tablet Take 1 tablet by mouth 2 (Two) Times a Day. 180 tablet 3     No facility-administered medications prior to visit.       Opioid medication/s are on  active medication list.  and I have evaluated his active treatment plan and pain score trends (see table).  There were no vitals filed for this visit.  I have reviewed the chart for potential of high risk medication and harmful drug interactions in the elderly.            Aspirin is not on active medication list.  Aspirin use is not indicated based on review of current medical condition/s. Risk of harm outweighs potential benefits.  .    Patient Active Problem List   Diagnosis   • Seasonal allergic rhinitis due to pollen   • Antral gastritis   • Coronel's esophagus without dysplasia   • Recurrent major depressive disorder, in partial remission (HCC)   • Elevated liver function tests   • Other emphysema (HCC)   • Essential hypertension   • Chronic gout of multiple sites   • Osteoarthritis of both knees   • Primary insomnia   • Benign prostatic hyperplasia with urinary frequency   • Immunization deficiency   • Non-intractable vomiting with nausea   • Medicare annual wellness visit, subsequent   • Hyperamylasemia   • ELEAZAR (generalized anxiety disorder)   • Bronchitis   • Chronic cough   • Colon cancer screening   • Hyperglycemia   • Clinical diagnosis of COVID-19   • Localized edema   • Venous insufficiency   • Preoperative clearance   • Obesity (BMI 30-39.9)   • Encounter for hepatitis C screening test for low risk patient     Advance Care Planning  Advance Directive is not on file.  ACP discussion was held with the patient during this visit. Patient has an advance directive (not in EMR), copy requested.    Review of Systems   Constitutional: Negative for diaphoresis and fatigue.   HENT: Negative for nosebleeds and postnasal drip.    Respiratory: Negative for cough and shortness of breath.    Cardiovascular: Negative for chest pain and leg swelling.   Gastrointestinal: Negative for abdominal pain and anal bleeding.   Musculoskeletal: Positive for arthralgias.        Objective    Vitals:    04/21/22 0739   BP: 110/80  "  BP Location: Right arm   Patient Position: Sitting   Pulse: 80   Temp: 97.5 °F (36.4 °C)   TempSrc: Temporal   SpO2: 98%   Weight: 117 kg (259 lb)   Height: 177.8 cm (70\")     BMI Readings from Last 1 Encounters:   22 37.16 kg/m²   BMI is above normal parameters. Recommendations include: exercise counseling    Does the patient have evidence of cognitive impairment? No    Physical Exam  Vitals reviewed.   Constitutional:       Appearance: He is well-developed. He is not diaphoretic.   HENT:      Head: Normocephalic and atraumatic.   Eyes:      General: No scleral icterus.     Pupils: Pupils are equal, round, and reactive to light.   Neck:      Thyroid: No thyromegaly.   Cardiovascular:      Rate and Rhythm: Normal rate and regular rhythm.      Heart sounds: No murmur heard.    No friction rub. No gallop.   Pulmonary:      Effort: Pulmonary effort is normal. No respiratory distress.      Breath sounds: No wheezing or rales.   Chest:      Chest wall: No tenderness.   Abdominal:      General: Bowel sounds are normal. There is no distension.      Palpations: Abdomen is soft.      Tenderness: There is no abdominal tenderness.   Musculoskeletal:         General: No deformity. Normal range of motion.   Lymphadenopathy:      Cervical: No cervical adenopathy.   Skin:     General: Skin is warm and dry.      Findings: No rash.   Neurological:      Cranial Nerves: No cranial nerve deficit.      Motor: No abnormal muscle tone.                 HEALTH RISK ASSESSMENT    Smoking Status:  Social History     Tobacco Use   Smoking Status Former Smoker   • Packs/day: 0.00   • Years: 0.00   • Pack years: 0.00   • Types: Cigarettes, Cigars   • Quit date: 1970   • Years since quittin.7   Smokeless Tobacco Never Used   Tobacco Comment    still smokes cigars occasionally      Alcohol Consumption:  Social History     Substance and Sexual Activity   Alcohol Use Yes   • Alcohol/week: 10.0 standard drinks   • Types: 10 Shots of " liquor per week    Comment: per week//Caffeine use: 2 cups daily      Fall Risk Screen:    EBEN Fall Risk Assessment has not been completed.    Depression Screening:  PHQ-2/PHQ-9 Depression Screening 4/21/2022   Retired Total Score -   Little Interest or Pleasure in Doing Things 0-->not at all   Feeling Down, Depressed or Hopeless 0-->not at all   PHQ-9: Brief Depression Severity Measure Score 0       Health Habits and Functional and Cognitive Screening:  Functional & Cognitive Status 4/21/2022   Do you have difficulty preparing food and eating? No   Do you have difficulty bathing yourself, getting dressed or grooming yourself? No   Do you have difficulty using the toilet? No   Do you have difficulty moving around from place to place? No   Do you have trouble with steps or getting out of a bed or a chair? No   Current Diet Well Balanced Diet   Dental Exam Up to date   Eye Exam Not up to date   Exercise (times per week) 2 times per week   Current Exercises Include Walking   Current Exercise Activities Include -   Do you need help using the phone?  No   Are you deaf or do you have serious difficulty hearing?  No   Do you need help with transportation? No   Do you need help shopping? No   Do you need help preparing meals?  No   Do you need help with housework?  No   Do you need help with laundry? No   Do you need help taking your medications? No   Do you need help managing money? No   Do you ever drive or ride in a car without wearing a seat belt? No   Have you felt unusual stress, anger or loneliness in the last month? No   Who do you live with? Spouse   If you need help, do you have trouble finding someone available to you? No   Have you been bothered in the last four weeks by sexual problems? -   Do you have difficulty concentrating, remembering or making decisions? No       Age-appropriate Screening Schedule:  Refer to the list below for future screening recommendations based on patient's age, sex and/or medical  conditions. Orders for these recommended tests are listed in the plan section. The patient has been provided with a written plan.    Health Maintenance   Topic Date Due   • DXA SCAN  Never done   • TDAP/TD VACCINES (1 - Tdap) Never done   • ZOSTER VACCINE (1 of 2) Never done   • INFLUENZA VACCINE  08/01/2022              Assessment/Plan   CMS Preventative Services Quick Reference  Risk Factors Identified During Encounter  Inactivity/Sedentary  The above risks/problems have been discussed with the patient.  Follow up actions/plans if indicated are seen below in the Assessment/Plan Section.  Pertinent information has been shared with the patient in the After Visit Summary.    Diagnoses and all orders for this visit:    1. Medicare annual wellness visit, subsequent (Primary)  -     CBC & Differential    2. Hyperglycemia  -     Hemoglobin A1c    3. Essential hypertension  -     Comprehensive Metabolic Panel  -     Lipid Panel With / Chol / HDL Ratio    4. Coronel's esophagus without dysplasia  -     CBC & Differential    5. Chronic gout of multiple sites, unspecified cause  -     Uric Acid    6. Encounter for hepatitis C screening test for low risk patient  -     Hepatitis C Antibody    HTN.  No orthostasis.  Continue meds.  Check CMP.    Hyperglycemia.  Check A1c.  Continue TLC.    Coronel's esophagus.   Controlled.  Continue pantoprazole 40 BID.     Gout.  Check uric acid.  Controlled.       Preventive Counseling:  Encouraged to stay active.  Covid vaccine booster number 2 recommended.  HEP A UTD.  Pneumovax UTD.  Colonoscopy UTD.    Dentist UTD.  Optho recommended.      Follow Up:   Return in about 6 months (around 10/21/2022).     An After Visit Summary and PPPS were made available to the patient.

## 2022-04-22 LAB
ALBUMIN SERPL-MCNC: 4.6 G/DL (ref 3.8–4.8)
ALBUMIN/GLOB SERPL: 2 {RATIO} (ref 1.2–2.2)
ALP SERPL-CCNC: 56 IU/L (ref 44–121)
ALT SERPL-CCNC: 26 IU/L (ref 0–44)
AST SERPL-CCNC: 30 IU/L (ref 0–40)
BASOPHILS # BLD AUTO: 0.1 X10E3/UL (ref 0–0.2)
BASOPHILS NFR BLD AUTO: 2 %
BILIRUB SERPL-MCNC: 2 MG/DL (ref 0–1.2)
BUN SERPL-MCNC: 10 MG/DL (ref 8–27)
BUN/CREAT SERPL: 10 (ref 10–24)
CALCIUM SERPL-MCNC: 9.3 MG/DL (ref 8.6–10.2)
CHLORIDE SERPL-SCNC: 101 MMOL/L (ref 96–106)
CHOLEST SERPL-MCNC: 147 MG/DL (ref 100–199)
CHOLEST/HDLC SERPL: 1.6 RATIO (ref 0–5)
CO2 SERPL-SCNC: 24 MMOL/L (ref 20–29)
CREAT SERPL-MCNC: 1.04 MG/DL (ref 0.76–1.27)
EGFRCR SERPLBLD CKD-EPI 2021: 78 ML/MIN/1.73
EOSINOPHIL # BLD AUTO: 0.1 X10E3/UL (ref 0–0.4)
EOSINOPHIL NFR BLD AUTO: 2 %
ERYTHROCYTE [DISTWIDTH] IN BLOOD BY AUTOMATED COUNT: 14.8 % (ref 11.6–15.4)
GLOBULIN SER CALC-MCNC: 2.3 G/DL (ref 1.5–4.5)
GLUCOSE SERPL-MCNC: 109 MG/DL (ref 65–99)
HBA1C MFR BLD: 5 % (ref 4.8–5.6)
HCT VFR BLD AUTO: 45.7 % (ref 37.5–51)
HCV AB S/CO SERPL IA: 0.2 S/CO RATIO (ref 0–0.9)
HDLC SERPL-MCNC: 90 MG/DL
HGB BLD-MCNC: 15.6 G/DL (ref 13–17.7)
IMM GRANULOCYTES # BLD AUTO: 0 X10E3/UL (ref 0–0.1)
IMM GRANULOCYTES NFR BLD AUTO: 0 %
LDLC SERPL CALC-MCNC: 44 MG/DL (ref 0–99)
LYMPHOCYTES # BLD AUTO: 1.2 X10E3/UL (ref 0.7–3.1)
LYMPHOCYTES NFR BLD AUTO: 18 %
MCH RBC QN AUTO: 29.5 PG (ref 26.6–33)
MCHC RBC AUTO-ENTMCNC: 34.1 G/DL (ref 31.5–35.7)
MCV RBC AUTO: 86 FL (ref 79–97)
MONOCYTES # BLD AUTO: 0.5 X10E3/UL (ref 0.1–0.9)
MONOCYTES NFR BLD AUTO: 8 %
NEUTROPHILS # BLD AUTO: 4.7 X10E3/UL (ref 1.4–7)
NEUTROPHILS NFR BLD AUTO: 70 %
PLATELET # BLD AUTO: 186 X10E3/UL (ref 150–450)
POTASSIUM SERPL-SCNC: 4.5 MMOL/L (ref 3.5–5.2)
PROT SERPL-MCNC: 6.9 G/DL (ref 6–8.5)
RBC # BLD AUTO: 5.29 X10E6/UL (ref 4.14–5.8)
SODIUM SERPL-SCNC: 138 MMOL/L (ref 134–144)
TRIGL SERPL-MCNC: 64 MG/DL (ref 0–149)
URATE SERPL-MCNC: 6.2 MG/DL (ref 3.8–8.4)
VLDLC SERPL CALC-MCNC: 13 MG/DL (ref 5–40)
WBC # BLD AUTO: 6.6 X10E3/UL (ref 3.4–10.8)

## 2022-09-17 DIAGNOSIS — J30.1 SEASONAL ALLERGIC RHINITIS DUE TO POLLEN: ICD-10-CM

## 2022-09-17 DIAGNOSIS — R05.3 CHRONIC COUGH: ICD-10-CM

## 2022-09-18 RX ORDER — MONTELUKAST SODIUM 10 MG/1
10 TABLET ORAL NIGHTLY
Qty: 90 TABLET | Refills: 3 | Status: SHIPPED | OUTPATIENT
Start: 2022-09-18

## 2022-10-20 ENCOUNTER — OFFICE VISIT (OUTPATIENT)
Dept: FAMILY MEDICINE CLINIC | Facility: CLINIC | Age: 72
End: 2022-10-20

## 2022-10-20 VITALS
OXYGEN SATURATION: 100 % | HEIGHT: 70 IN | DIASTOLIC BLOOD PRESSURE: 84 MMHG | SYSTOLIC BLOOD PRESSURE: 126 MMHG | WEIGHT: 263 LBS | BODY MASS INDEX: 37.65 KG/M2 | HEART RATE: 82 BPM | TEMPERATURE: 98 F

## 2022-10-20 DIAGNOSIS — R61 HYPERHIDROSIS: ICD-10-CM

## 2022-10-20 DIAGNOSIS — K22.70 BARRETT'S ESOPHAGUS WITHOUT DYSPLASIA: ICD-10-CM

## 2022-10-20 DIAGNOSIS — I10 ESSENTIAL HYPERTENSION: Primary | ICD-10-CM

## 2022-10-20 DIAGNOSIS — F41.1 GAD (GENERALIZED ANXIETY DISORDER): ICD-10-CM

## 2022-10-20 PROCEDURE — 99214 OFFICE O/P EST MOD 30 MIN: CPT | Performed by: FAMILY MEDICINE

## 2022-10-20 RX ORDER — ALUMINUM CHLORIDE 20 %
SOLUTION, NON-ORAL TOPICAL NIGHTLY
Qty: 60 ML | Refills: 5 | Status: SHIPPED | OUTPATIENT
Start: 2022-10-20

## 2022-10-20 NOTE — PROGRESS NOTES
Chief Complaint   Patient presents with   • Hypertension       Subjective   Álvaro Jacques is a 72 y.o. male.     History of Present Illness   F/U HTn.  Doing wel with meds.  No Se.    F/U ALLA with Coronel's.  Doing well with meds.    C/o sweaty feet for years.  Increased trouble.    The following portions of the patient's history were reviewed and updated as appropriate: allergies, current medications, past family history, past medical history, past social history, past surgical history and problem list.    Review of Systems   Constitutional: Positive for diaphoresis. Negative for appetite change and fatigue.   HENT: Negative for nosebleeds and sore throat.    Eyes: Negative for blurred vision and visual disturbance.   Respiratory: Negative for shortness of breath and wheezing.    Cardiovascular: Negative for chest pain and leg swelling.   Gastrointestinal: Negative for abdominal distention and abdominal pain.   Endocrine: Negative for cold intolerance and polyuria.   Genitourinary: Negative for dysuria and hematuria.   Musculoskeletal: Negative for arthralgias and myalgias.   Skin: Negative for color change and rash.   Neurological: Negative for weakness and confusion.   Psychiatric/Behavioral: Negative for agitation and depressed mood. The patient is nervous/anxious.        Patient Active Problem List   Diagnosis   • Seasonal allergic rhinitis due to pollen   • Antral gastritis   • Coronel's esophagus without dysplasia   • Recurrent major depressive disorder, in partial remission (HCC)   • Elevated liver function tests   • Other emphysema (HCC)   • Essential hypertension   • Chronic gout of multiple sites   • Osteoarthritis of both knees   • Primary insomnia   • Benign prostatic hyperplasia with urinary frequency   • Immunization deficiency   • Non-intractable vomiting with nausea   • Medicare annual wellness visit, subsequent   • Hyperamylasemia   • ELEAZAR (generalized anxiety disorder)   • Bronchitis   • Chronic  cough   • Colon cancer screening   • Hyperglycemia   • Clinical diagnosis of COVID-19   • Localized edema   • Venous insufficiency   • Preoperative clearance   • Obesity (BMI 30-39.9)   • Encounter for hepatitis C screening test for low risk patient   • Hyperhidrosis       No Known Allergies      Current Outpatient Medications:   •  cetirizine (zyrTEC) 5 MG tablet, Take 5 mg by mouth Daily., Disp: , Rfl:   •  fluticasone (FLONASE) 50 MCG/ACT nasal spray, 1 spray into the nostril(s) as directed by provider Daily., Disp: 16 g, Rfl: 5  •  ibuprofen (ADVIL,MOTRIN) 600 MG tablet, Take 600 mg by mouth Daily As Needed for Mild Pain ., Disp: , Rfl:   •  metoprolol tartrate (LOPRESSOR) 50 MG tablet, Take 1 tablet by mouth 2 (Two) Times a Day., Disp: 180 tablet, Rfl: 3  •  montelukast (SINGULAIR) 10 MG tablet, TAKE 1 TABLET BY MOUTH EVERY NIGHT, Disp: 90 tablet, Rfl: 3  •  pantoprazole (PROTONIX) 40 MG EC tablet, Take 1 tablet by mouth 2 (Two) Times a Day., Disp: 180 tablet, Rfl: 3  •  spironolactone (Aldactone) 25 MG tablet, Take 1 tablet by mouth 2 (Two) Times a Day., Disp: 180 tablet, Rfl: 3  •  aluminum chloride (Drysol) 20 % external solution, Apply  topically to the appropriate area as directed Every Night., Disp: 60 mL, Rfl: 5  •  sertraline (Zoloft) 50 MG tablet, Take 1 tablet by mouth Daily., Disp: 90 tablet, Rfl: 3    Past Medical History:   Diagnosis Date   • Allergic    • Allergic rhinitis    • Antral gastritis    • Coronel's esophagus    • Calcaneal spur    • COVID-19     January 2021; had infusion   • Depression with anxiety    • Elevated liver function tests    • Emphysema lung (HCC)    • Enlarged aorta (HCC)     no treatment needed per patient   • Erectile dysfunction 2022   • GERD (gastroesophageal reflux disease)    • Gout    • Hypertension    • Inability to attain erection    • Influenza    • Knee osteoarthritis    • Osteoarthritis    • Osteoporosis    • Primary insomnia    • Urinary frequency        Past  Surgical History:   Procedure Laterality Date   • COLONOSCOPY     • COLONOSCOPY W/ POLYPECTOMY N/A 2020    Procedure: COLONOSCOPY TO CECUM WITH COLD AND HOT SNARE POLYPECTOMIES AND COLD BIOPSY POLYPECTOMY;  Surgeon: Vincent Man MD;  Location:  [facility];  Service: Gastroenterology;  Laterality: N/A;  PRE: SCREENING  POST: POLYPS, DIVERTICULOSIS   • ENDOSCOPY N/A 2020    Procedure: ESOPHAGOGASTRODUODENOSCOPY WITH COLD BIOPSIES;  Surgeon: Vincent Man MD;  Location:  [facility];  Service: Gastroenterology;  Laterality: N/A;  PRE: HX BARRETTS ESOPHAGUS  POST: BARRETTS ESOPHAGUS, GASTRIC POLYPS, HIATAL HERNIA, GASTRITIS, DUODENAL LYPOMA   • ENDOSCOPY N/A 4/15/2021    Procedure: ESOPHAGOGASTRODUODENOSCOPY WITH BIOPSIES;  Surgeon: Vincent Man MD;  Location: [facility];  Service: Gastroenterology;  Laterality: N/A;  PRE OP - H/O SALDANA'S  POST OP -   • EYE SURGERY      CATARACTS   • HERNIA REPAIR     • KNEE ARTHROSCOPY     • KNEE CARTILAGE SURGERY Right 2021       Family History   Problem Relation Age of Onset   • Heart disease Mother    • Heart failure Mother    • Cancer Father    • Cancer Brother    • Malig Hyperthermia Neg Hx        Social History     Tobacco Use   • Smoking status: Former     Packs/day: 0.00     Years: 0.00     Pack years: 0.00     Types: Cigarettes, Cigars     Quit date: 1970     Years since quittin.2   • Smokeless tobacco: Never   • Tobacco comments:     still smokes cigars occasionally    Substance Use Topics   • Alcohol use: Yes     Alcohol/week: 10.0 standard drinks     Types: 10 Shots of liquor per week     Comment: per week//Caffeine use: 2 cups daily             Objective     Vitals:    10/20/22 0853   BP: 126/84   Pulse: 82   Temp: 98 °F (36.7 °C)   SpO2: 100%     Body mass index is 37.74 kg/m².    Physical Exam  Vitals reviewed.   Constitutional:       Appearance: He is well-developed. He is not diaphoretic.    HENT:      Head: Normocephalic and atraumatic.   Eyes:      General: No scleral icterus.     Pupils: Pupils are equal, round, and reactive to light.   Neck:      Thyroid: No thyromegaly.   Cardiovascular:      Rate and Rhythm: Normal rate and regular rhythm.      Heart sounds: No murmur heard.    No friction rub. No gallop.   Pulmonary:      Effort: Pulmonary effort is normal. No respiratory distress.      Breath sounds: No wheezing or rales.   Chest:      Chest wall: No tenderness.   Abdominal:      General: Bowel sounds are normal. There is no distension.      Palpations: Abdomen is soft.      Tenderness: There is no abdominal tenderness.   Musculoskeletal:         General: No deformity. Normal range of motion.   Lymphadenopathy:      Cervical: No cervical adenopathy.   Skin:     General: Skin is warm and dry.      Findings: No rash.   Neurological:      Cranial Nerves: No cranial nerve deficit.      Motor: No abnormal muscle tone.         Lab Results   Component Value Date    GLUCOSE 109 (H) 04/21/2022    BUN 10 04/21/2022    CREATININE 1.04 04/21/2022    EGFRIFNONA 87 05/10/2021    EGFRIFAFRI 106 05/10/2021    BCR 10 04/21/2022    K 4.5 04/21/2022    CO2 24 04/21/2022    CALCIUM 9.3 04/21/2022    PROTENTOTREF 6.9 04/21/2022    ALBUMIN 4.6 04/21/2022    LABIL2 2.0 04/21/2022    AST 30 04/21/2022    ALT 26 04/21/2022       WBC   Date Value Ref Range Status   04/21/2022 6.6 3.4 - 10.8 x10E3/uL Final   09/29/2021 6.55 4.5 - 11.0 10*3/uL Final     RBC   Date Value Ref Range Status   04/21/2022 5.29 4.14 - 5.80 x10E6/uL Final   09/29/2021 5.20 4.5 - 5.9 10*6/uL Final     Hemoglobin   Date Value Ref Range Status   04/21/2022 15.6 13.0 - 17.7 g/dL Final   09/29/2021 15.7 13.5 - 17.5 g/dL Final     Hematocrit   Date Value Ref Range Status   04/21/2022 45.7 37.5 - 51.0 % Final   09/29/2021 44.3 41.0 - 53.0 % Final     MCV   Date Value Ref Range Status   04/21/2022 86 79 - 97 fL Final   09/29/2021 85.2 80.0 - 100.0 fL  Final     MCH   Date Value Ref Range Status   04/21/2022 29.5 26.6 - 33.0 pg Final   09/29/2021 30.2 26.0 - 34.0 pg Final     MCHC   Date Value Ref Range Status   04/21/2022 34.1 31.5 - 35.7 g/dL Final   09/29/2021 35.4 31.0 - 37.0 g/dL Final     RDW   Date Value Ref Range Status   04/21/2022 14.8 11.6 - 15.4 % Final   09/29/2021 15.3 12.0 - 16.8 % Final     MPV   Date Value Ref Range Status   09/29/2021 9.1 8.4 - 12.4 fL Final     Platelets   Date Value Ref Range Status   04/21/2022 186 150 - 450 x10E3/uL Final   09/29/2021 176 140 - 440 10*3/uL Final     Neutrophil Rel %   Date Value Ref Range Status   04/21/2022 70 Not Estab. % Final   09/29/2021 73.8 45 - 80 % Final     Lymphocyte Rel %   Date Value Ref Range Status   04/21/2022 18 Not Estab. % Final   09/29/2021 15.9 15 - 50 % Final     Monocyte Rel %   Date Value Ref Range Status   04/21/2022 8 Not Estab. % Final   09/29/2021 7.3 0 - 15 % Final     Eosinophil Rel %   Date Value Ref Range Status   04/21/2022 2 Not Estab. % Final     Eosinophil %   Date Value Ref Range Status   09/29/2021 1.1 0 - 7 % Final     Basophil Rel %   Date Value Ref Range Status   04/21/2022 2 Not Estab. % Final   09/29/2021 1.4 0 - 2 % Final     Immature Grans %   Date Value Ref Range Status   09/29/2021 0.5 0.0 - 1.0 % Final     Neutrophils Absolute   Date Value Ref Range Status   04/21/2022 4.7 1.4 - 7.0 x10E3/uL Final   09/29/2021 4.84 2.0 - 8.8 10*3/uL Final     Lymphocytes Absolute   Date Value Ref Range Status   04/21/2022 1.2 0.7 - 3.1 x10E3/uL Final   09/29/2021 1.04 0.7 - 5.5 10*3/uL Final     Monocytes Absolute   Date Value Ref Range Status   04/21/2022 0.5 0.1 - 0.9 x10E3/uL Final   09/29/2021 0.48 0.0 - 1.7 10*3/uL Final     Eosinophils Absolute   Date Value Ref Range Status   04/21/2022 0.1 0.0 - 0.4 x10E3/uL Final   09/29/2021 0.07 0.0 - 0.8 10*3/uL Final     Basophils Absolute   Date Value Ref Range Status   04/21/2022 0.1 0.0 - 0.2 x10E3/uL Final   09/29/2021 0.09 0.0 -  0.2 10*3/uL Final     Immature Grans, Absolute   Date Value Ref Range Status   09/29/2021 0.03 0.00 - 0.10 10*3/uL Final     nRBC   Date Value Ref Range Status   09/29/2021 0 0 /100(WBC) Final       Lab Results   Component Value Date    HGBA1C 5.0 04/21/2022       No results found for: [id number]    TSH   Date Value Ref Range Status   07/30/2019 2.850 0.270 - 4.200 mIU/mL Final       No results found for: CHOL  Lab Results   Component Value Date    TRIG 64 04/21/2022     Lab Results   Component Value Date    HDL 90 04/21/2022     Lab Results   Component Value Date    LDL 44 04/21/2022     Lab Results   Component Value Date    VLDL 13 04/21/2022     No results found for: LDLHDL      Procedures    Assessment & Plan   Problems Addressed this Visit     Coronel's esophagus without dysplasia    Essential hypertension - Primary    Relevant Orders    Comprehensive Metabolic Panel    ELEAZAR (generalized anxiety disorder)    Relevant Medications    sertraline (Zoloft) 50 MG tablet    Hyperhidrosis    Relevant Medications    aluminum chloride (Drysol) 20 % external solution   Diagnoses       Codes Comments    Essential hypertension    -  Primary ICD-10-CM: I10  ICD-9-CM: 401.9     Coronel's esophagus without dysplasia     ICD-10-CM: K22.70  ICD-9-CM: 530.85     ELEAZAR (generalized anxiety disorder)     ICD-10-CM: F41.1  ICD-9-CM: 300.02     Hyperhidrosis     ICD-10-CM: R61  ICD-9-CM: 705.21         HTN.  Doing well with meds.  Continue meds.  Check CMP.   Hyperhidrosis.  New problem.  Rx for drysol.    ELEAZAR.  Uncontrolled.  Chronic.  Start sertraline 50 a day.    Orders Placed This Encounter   Procedures   • Comprehensive Metabolic Panel     Order Specific Question:   Release to patient     Answer:   Routine Release       Current Outpatient Medications   Medication Sig Dispense Refill   • cetirizine (zyrTEC) 5 MG tablet Take 5 mg by mouth Daily.     • fluticasone (FLONASE) 50 MCG/ACT nasal spray 1 spray into the nostril(s) as  directed by provider Daily. 16 g 5   • ibuprofen (ADVIL,MOTRIN) 600 MG tablet Take 600 mg by mouth Daily As Needed for Mild Pain .     • metoprolol tartrate (LOPRESSOR) 50 MG tablet Take 1 tablet by mouth 2 (Two) Times a Day. 180 tablet 3   • montelukast (SINGULAIR) 10 MG tablet TAKE 1 TABLET BY MOUTH EVERY NIGHT 90 tablet 3   • pantoprazole (PROTONIX) 40 MG EC tablet Take 1 tablet by mouth 2 (Two) Times a Day. 180 tablet 3   • spironolactone (Aldactone) 25 MG tablet Take 1 tablet by mouth 2 (Two) Times a Day. 180 tablet 3   • aluminum chloride (Drysol) 20 % external solution Apply  topically to the appropriate area as directed Every Night. 60 mL 5   • sertraline (Zoloft) 50 MG tablet Take 1 tablet by mouth Daily. 90 tablet 3     No current facility-administered medications for this visit.       Álvaro Jacques had no medications administered during this visit.    Return in about 6 months (around 4/20/2023).    There are no Patient Instructions on file for this visit.

## 2022-10-21 LAB
ALBUMIN SERPL-MCNC: 4.3 G/DL (ref 3.5–5.2)
ALBUMIN/GLOB SERPL: 1.9 G/DL
ALP SERPL-CCNC: 66 U/L (ref 39–117)
ALT SERPL-CCNC: 37 U/L (ref 1–41)
AST SERPL-CCNC: 33 U/L (ref 1–40)
BILIRUB SERPL-MCNC: 2 MG/DL (ref 0–1.2)
BUN SERPL-MCNC: 10 MG/DL (ref 8–23)
BUN/CREAT SERPL: 11 (ref 7–25)
CALCIUM SERPL-MCNC: 9.2 MG/DL (ref 8.6–10.5)
CHLORIDE SERPL-SCNC: 98 MMOL/L (ref 98–107)
CO2 SERPL-SCNC: 28.1 MMOL/L (ref 22–29)
CREAT SERPL-MCNC: 0.91 MG/DL (ref 0.76–1.27)
EGFRCR SERPLBLD CKD-EPI 2021: 90.7 ML/MIN/1.73
GLOBULIN SER CALC-MCNC: 2.3 GM/DL
GLUCOSE SERPL-MCNC: 105 MG/DL (ref 65–99)
POTASSIUM SERPL-SCNC: 4.3 MMOL/L (ref 3.5–5.2)
PROT SERPL-MCNC: 6.6 G/DL (ref 6–8.5)
SODIUM SERPL-SCNC: 137 MMOL/L (ref 136–145)

## 2022-12-01 DIAGNOSIS — I10 ESSENTIAL HYPERTENSION: ICD-10-CM

## 2022-12-02 RX ORDER — METOPROLOL TARTRATE 50 MG/1
TABLET, FILM COATED ORAL
Qty: 180 TABLET | Refills: 0 | Status: SHIPPED | OUTPATIENT
Start: 2022-12-02 | End: 2023-03-01

## 2022-12-02 NOTE — TELEPHONE ENCOUNTER
Rx Refill Note  Requested Prescriptions     Pending Prescriptions Disp Refills   • metoprolol tartrate (LOPRESSOR) 50 MG tablet [Pharmacy Med Name: Metoprolol Tartrate Oral Tablet 50 MG] 180 tablet 0     Sig: TAKE 1 TABLET BY MOUTH TWO TIMES A DAY      Last office visit with prescribing clinician: 10/20/2022   Last telemedicine visit with prescribing clinician: 4/26/2023   Next office visit with prescribing clinician: 4/26/2023                         Would you like a call back once the refill request has been completed: [] Yes [] No    If the office needs to give you a call back, can they leave a voicemail: [] Yes [] No    Jeanna Hutchins MA  12/02/22, 07:08 EST

## 2022-12-02 NOTE — TELEPHONE ENCOUNTER
Rx Refill Note  Requested Prescriptions     Pending Prescriptions Disp Refills   • metoprolol tartrate (LOPRESSOR) 50 MG tablet [Pharmacy Med Name: Metoprolol Tartrate Oral Tablet 50 MG] 180 tablet 0     Sig: TAKE 1 TABLET BY MOUTH TWO TIMES A DAY      Last office visit with prescribing clinician: 10/20/2022   Last telemedicine visit with prescribing clinician: 4/26/2023   Next office visit with prescribing clinician: 4/26/2023

## 2022-12-18 DIAGNOSIS — I10 ESSENTIAL HYPERTENSION: ICD-10-CM

## 2022-12-18 DIAGNOSIS — R60.0 LOCALIZED EDEMA: ICD-10-CM

## 2022-12-18 DIAGNOSIS — K22.70 BARRETT'S ESOPHAGUS WITHOUT DYSPLASIA: ICD-10-CM

## 2022-12-18 RX ORDER — PANTOPRAZOLE SODIUM 40 MG/1
TABLET, DELAYED RELEASE ORAL
Qty: 180 TABLET | Refills: 3 | Status: SHIPPED | OUTPATIENT
Start: 2022-12-18

## 2022-12-18 RX ORDER — SPIRONOLACTONE 25 MG/1
TABLET ORAL
Qty: 180 TABLET | Refills: 3 | Status: SHIPPED | OUTPATIENT
Start: 2022-12-18

## 2023-03-01 DIAGNOSIS — I10 ESSENTIAL HYPERTENSION: ICD-10-CM

## 2023-03-01 RX ORDER — METOPROLOL TARTRATE 50 MG/1
TABLET, FILM COATED ORAL
Qty: 180 TABLET | Refills: 1 | Status: SHIPPED | OUTPATIENT
Start: 2023-03-01

## 2023-04-26 ENCOUNTER — OFFICE VISIT (OUTPATIENT)
Dept: FAMILY MEDICINE CLINIC | Facility: CLINIC | Age: 73
End: 2023-04-26
Payer: MEDICARE

## 2023-04-26 VITALS
SYSTOLIC BLOOD PRESSURE: 120 MMHG | WEIGHT: 264.4 LBS | TEMPERATURE: 96.3 F | HEIGHT: 70 IN | OXYGEN SATURATION: 94 % | HEART RATE: 78 BPM | DIASTOLIC BLOOD PRESSURE: 80 MMHG | BODY MASS INDEX: 37.85 KG/M2

## 2023-04-26 DIAGNOSIS — R73.9 HYPERGLYCEMIA: ICD-10-CM

## 2023-04-26 DIAGNOSIS — N40.1 BENIGN PROSTATIC HYPERPLASIA WITH URINARY FREQUENCY: ICD-10-CM

## 2023-04-26 DIAGNOSIS — Z00.00 MEDICARE ANNUAL WELLNESS VISIT, SUBSEQUENT: Primary | ICD-10-CM

## 2023-04-26 DIAGNOSIS — I10 ESSENTIAL HYPERTENSION: ICD-10-CM

## 2023-04-26 DIAGNOSIS — F41.1 GAD (GENERALIZED ANXIETY DISORDER): ICD-10-CM

## 2023-04-26 DIAGNOSIS — R35.0 BENIGN PROSTATIC HYPERPLASIA WITH URINARY FREQUENCY: ICD-10-CM

## 2023-04-26 RX ORDER — ESCITALOPRAM OXALATE 10 MG/1
10 TABLET ORAL DAILY
Qty: 90 TABLET | Refills: 3 | Status: SHIPPED | OUTPATIENT
Start: 2023-04-26

## 2023-04-26 NOTE — PROGRESS NOTES
The ABCs of the Annual Wellness Visit  Subsequent Medicare Wellness Visit    Subjective    Álvaro Jacques is a 72 y.o. male who presents for a Subsequent Medicare Wellness Visit.    The following portions of the patient's history were reviewed and   updated as appropriate: allergies, current medications, past family history, past medical history, past social history, past surgical history and problem list.    Compared to one year ago, the patient feels his physical   health is the same.    Compared to one year ago, the patient feels his mental   health is the same.    Recent Hospitalizations:  He was not admitted to the hospital during the last year.       Current Medical Providers:  Patient Care Team:  Corey Martínez MD as PCP - General (Family Medicine)    Outpatient Medications Prior to Visit   Medication Sig Dispense Refill   • cetirizine (zyrTEC) 5 MG tablet Take 1 tablet by mouth Daily.     • fluticasone (FLONASE) 50 MCG/ACT nasal spray 1 spray into the nostril(s) as directed by provider Daily. 16 g 5   • ibuprofen (ADVIL,MOTRIN) 600 MG tablet Take 1 tablet by mouth Daily As Needed for Mild Pain.     • metoprolol tartrate (LOPRESSOR) 50 MG tablet TAKE 1 TABLET BY MOUTH TWO TIMES A  tablet 1   • montelukast (SINGULAIR) 10 MG tablet TAKE 1 TABLET BY MOUTH EVERY NIGHT 90 tablet 3   • pantoprazole (PROTONIX) 40 MG EC tablet TAKE 1 TABLET BY MOUTH TWO TIMES A  tablet 3   • spironolactone (ALDACTONE) 25 MG tablet TAKE 1 TABLET BY MOUTH TWO TIMES A  tablet 3   • sertraline (Zoloft) 50 MG tablet Take 1 tablet by mouth Daily. 90 tablet 3   • aluminum chloride (Drysol) 20 % external solution Apply  topically to the appropriate area as directed Every Night. 60 mL 5     No facility-administered medications prior to visit.       No opioid medication identified on active medication list. I have reviewed chart for other potential  high risk medication/s and harmful drug interactions in the  "elderly.          Aspirin is not on active medication list.  Aspirin use is not indicated based on review of current medical condition/s. Risk of harm outweighs potential benefits.  .    Patient Active Problem List   Diagnosis   • Seasonal allergic rhinitis due to pollen   • Antral gastritis   • Coronel's esophagus without dysplasia   • Recurrent major depressive disorder, in partial remission   • Elevated liver function tests   • Other emphysema   • Essential hypertension   • Chronic gout of multiple sites   • Osteoarthritis of both knees   • Primary insomnia   • Benign prostatic hyperplasia with urinary frequency   • Immunization deficiency   • Non-intractable vomiting with nausea   • Medicare annual wellness visit, subsequent   • Hyperamylasemia   • EELAZAR (generalized anxiety disorder)   • Bronchitis   • Chronic cough   • Colon cancer screening   • Hyperglycemia   • Clinical diagnosis of COVID-19   • Localized edema   • Venous insufficiency   • Preoperative clearance   • Obesity (BMI 30-39.9)   • Encounter for hepatitis C screening test for low risk patient   • Hyperhidrosis     Advance Care Planning   Advance Care Planning     Advance Directive is not on file.  ACP discussion was held with the patient during this visit. Patient has an advance directive (not in EMR), copy requested.     Objective    Vitals:    04/26/23 0910   BP: 120/80   BP Location: Left arm   Patient Position: Sitting   Cuff Size: Adult   Pulse: 78   Temp: 96.3 °F (35.7 °C)   SpO2: 94%   Weight: 120 kg (264 lb 6.4 oz)   Height: 177.8 cm (70\")     Estimated body mass index is 37.94 kg/m² as calculated from the following:    Height as of this encounter: 177.8 cm (70\").    Weight as of this encounter: 120 kg (264 lb 6.4 oz).    Class 2 Severe Obesity (BMI >=35 and <=39.9). Obesity-related health conditions include the following: hypertension. Obesity is unchanged. BMI is is above average; BMI management plan is completed. We discussed portion " control and increasing exercise.      Does the patient have evidence of cognitive impairment? No          HEALTH RISK ASSESSMENT    Smoking Status:  Social History     Tobacco Use   Smoking Status Former   • Packs/day: 0.00   • Years: 0.00   • Pack years: 0.00   • Types: Cigarettes, Cigars   • Quit date: 1970   • Years since quittin.7   Smokeless Tobacco Never   Tobacco Comments    still smokes cigars occasionally      Alcohol Consumption:  Social History     Substance and Sexual Activity   Alcohol Use Yes   • Alcohol/week: 10.0 standard drinks   • Types: 10 Shots of liquor per week    Comment: per week//Caffeine use: 2 cups daily      Fall Risk Screen:    EBEN Fall Risk Assessment has not been completed.    Depression Screenin/26/2023     9:00 AM   PHQ-2/PHQ-9 Depression Screening   Little Interest or Pleasure in Doing Things 0-->not at all   PHQ-9: Brief Depression Severity Measure Score 0       Health Habits and Functional and Cognitive Screenin/26/2023     9:00 AM   Functional & Cognitive Status   Do you have difficulty preparing food and eating? No   Do you have difficulty bathing yourself, getting dressed or grooming yourself? No   Do you have difficulty using the toilet? No   Do you have difficulty moving around from place to place? No   Do you have trouble with steps or getting out of a bed or a chair? No   Current Diet Well Balanced Diet   Dental Exam Up to date   Eye Exam Up to date   Exercise (times per week) 3 times per week   Current Exercises Include Walking   Do you need help using the phone?  No   Are you deaf or do you have serious difficulty hearing?  No   Do you need help with transportation? No   Do you need help shopping? No   Do you need help preparing meals?  No   Do you need help with housework?  No   Do you need help with laundry? No   Do you need help taking your medications? No   Do you need help managing money? No   Do you ever drive or ride in a car without  wearing a seat belt? No   Have you felt unusual stress, anger or loneliness in the last month? No   Who do you live with? Spouse   Do you have difficulty concentrating, remembering or making decisions? No       Age-appropriate Screening Schedule:  Refer to the list below for future screening recommendations based on patient's age, sex and/or medical conditions. Orders for these recommended tests are listed in the plan section. The patient has been provided with a written plan.    Health Maintenance   Topic Date Due   • DXA SCAN  Never done   • TDAP/TD VACCINES (1 - Tdap) Never done   • ZOSTER VACCINE (1 of 2) Never done   • AAA SCREEN (ONE-TIME)  Never done   • GASTROSCOPY (EGD)  04/15/2022   • INFLUENZA VACCINE  08/01/2023   • COLORECTAL CANCER SCREENING  12/17/2023   • ANNUAL WELLNESS VISIT  04/26/2024   • HEPATITIS C SCREENING  Completed   • COVID-19 Vaccine  Completed   • Pneumococcal Vaccine 65+  Completed                  CMS Preventative Services Quick Reference  Risk Factors Identified During Encounter  Inactivity/Sedentary: Patient was advised to exercise at least 150 minutes a week per CDC recommendations.  The above risks/problems have been discussed with the patient.  Pertinent information has been shared with the patient in the After Visit Summary.  An After Visit Summary and PPPS were made available to the patient.    Preventive Counseling:  Encouraged to stay active.  Covid vaccine UTD.  HEP A UTD.  Pneumovax UTD.  Colonoscopy UTD.  Shingrix recommended.    Dentist UTD.  Optho UTD.      Follow Up:   Next Medicare Wellness visit to be scheduled in 1 year.       Additional E&M Note during same encounter follows:  Patient has multiple medical problems which are significant and separately identifiable that require additional work above and beyond the Medicare Wellness Visit.      Chief Complaint  Medicare Wellness-subsequent    Subjective        HPI  Álvaro Jacques is also being seen today for ELEAZAR.   "Increased irritability.  No help with sertraline.  Going on many months.           Objective   Vital Signs:  /80 (BP Location: Left arm, Patient Position: Sitting, Cuff Size: Adult)   Pulse 78   Temp 96.3 °F (35.7 °C)   Ht 177.8 cm (70\")   Wt 120 kg (264 lb 6.4 oz)   SpO2 94%   BMI 37.94 kg/m²     Physical Exam  Vitals reviewed.   Constitutional:       Appearance: He is well-developed. He is not diaphoretic.   HENT:      Head: Normocephalic and atraumatic.   Eyes:      General: No scleral icterus.     Pupils: Pupils are equal, round, and reactive to light.   Neck:      Thyroid: No thyromegaly.   Cardiovascular:      Rate and Rhythm: Normal rate and regular rhythm.      Heart sounds: No murmur heard.    No friction rub. No gallop.   Pulmonary:      Effort: Pulmonary effort is normal. No respiratory distress.      Breath sounds: No wheezing or rales.   Chest:      Chest wall: No tenderness.   Abdominal:      General: Bowel sounds are normal. There is no distension.      Palpations: Abdomen is soft.      Tenderness: There is no abdominal tenderness.   Musculoskeletal:         General: No deformity. Normal range of motion.   Lymphadenopathy:      Cervical: No cervical adenopathy.   Skin:     General: Skin is warm and dry.      Findings: No rash.   Neurological:      Cranial Nerves: No cranial nerve deficit.      Motor: No abnormal muscle tone.                         Assessment and Plan   Diagnoses and all orders for this visit:    1. Medicare annual wellness visit, subsequent (Primary)    2. ELEAZAR (generalized anxiety disorder)  -     escitalopram (Lexapro) 10 MG tablet; Take 1 tablet by mouth Daily.  Dispense: 90 tablet; Refill: 3    3. Benign prostatic hyperplasia with urinary frequency  -     PSA DIAGNOSTIC    4. Hyperglycemia  -     Hemoglobin A1c    5. Essential hypertension  -     Comprehensive Metabolic Panel  -     Lipid Panel With / Chol / HDL Ratio      ELEAZAR.  Uncontrolled . Chronic.  Stop " sertraline.  Start lexapro 10 a day.           Follow Up   Return in about 6 months (around 10/26/2023).  Patient was given instructions and counseling regarding his condition or for health maintenance advice. Please see specific information pulled into the AVS if appropriate.

## 2023-04-27 LAB
ALBUMIN SERPL-MCNC: 4.5 G/DL (ref 3.5–5.2)
ALBUMIN/GLOB SERPL: 2 G/DL
ALP SERPL-CCNC: 53 U/L (ref 39–117)
ALT SERPL-CCNC: 16 U/L (ref 1–41)
AST SERPL-CCNC: 22 U/L (ref 1–40)
BILIRUB SERPL-MCNC: 2.3 MG/DL (ref 0–1.2)
BUN SERPL-MCNC: 11 MG/DL (ref 8–23)
BUN/CREAT SERPL: 11.6 (ref 7–25)
CALCIUM SERPL-MCNC: 9.4 MG/DL (ref 8.6–10.5)
CHLORIDE SERPL-SCNC: 100 MMOL/L (ref 98–107)
CHOLEST SERPL-MCNC: 130 MG/DL (ref 0–200)
CHOLEST/HDLC SERPL: 1.83 {RATIO}
CO2 SERPL-SCNC: 26.1 MMOL/L (ref 22–29)
CREAT SERPL-MCNC: 0.95 MG/DL (ref 0.76–1.27)
EGFRCR SERPLBLD CKD-EPI 2021: 86.1 ML/MIN/1.73
GLOBULIN SER CALC-MCNC: 2.3 GM/DL
GLUCOSE SERPL-MCNC: 112 MG/DL (ref 65–99)
HBA1C MFR BLD: 4.7 % (ref 4.8–5.6)
HDLC SERPL-MCNC: 71 MG/DL (ref 40–60)
LDLC SERPL CALC-MCNC: 46 MG/DL (ref 0–100)
POTASSIUM SERPL-SCNC: 4.6 MMOL/L (ref 3.5–5.2)
PROT SERPL-MCNC: 6.8 G/DL (ref 6–8.5)
PSA SERPL-MCNC: 2.15 NG/ML (ref 0–4)
SODIUM SERPL-SCNC: 136 MMOL/L (ref 136–145)
TRIGL SERPL-MCNC: 58 MG/DL (ref 0–150)
VLDLC SERPL CALC-MCNC: 13 MG/DL (ref 5–40)

## 2023-07-27 ENCOUNTER — TELEPHONE (OUTPATIENT)
Dept: GASTROENTEROLOGY | Facility: CLINIC | Age: 73
End: 2023-07-27
Payer: MEDICARE

## 2023-07-31 ENCOUNTER — TELEPHONE (OUTPATIENT)
Dept: GASTROENTEROLOGY | Facility: CLINIC | Age: 73
End: 2023-07-31
Payer: MEDICARE

## 2023-08-02 PROBLEM — Z86.0100 PERSONAL HISTORY OF COLONIC POLYPS: Status: ACTIVE | Noted: 2023-08-02

## 2023-08-02 PROBLEM — Z86.010 PERSONAL HISTORY OF COLONIC POLYPS: Status: ACTIVE | Noted: 2023-08-02

## 2023-08-28 ENCOUNTER — TELEPHONE (OUTPATIENT)
Dept: GASTROENTEROLOGY | Facility: CLINIC | Age: 73
End: 2023-08-28
Payer: MEDICARE

## 2023-08-30 DIAGNOSIS — I10 ESSENTIAL HYPERTENSION: ICD-10-CM

## 2023-08-31 RX ORDER — METOPROLOL TARTRATE 50 MG/1
TABLET, FILM COATED ORAL
Qty: 180 TABLET | Refills: 3 | Status: SHIPPED | OUTPATIENT
Start: 2023-08-31

## 2023-08-31 NOTE — SIGNIFICANT NOTE
Education provided the Patient on the following:    - Nothing to Eat or Drink after MN the night before the procedure    - Avoid red/purple fluids while completing their bowel prep as ordered by physician  -Contact Gastrointerologist office for any questions about specific details regarding colon prep    -You will need to have someone drive you home after your colonoscopy and remain with you for 24 hours after the procedure  - The date of your procedure, your are welcome to have one visitor at bedside or remain within 10-15 minutes of [facility]  -Please wear warm socks when you arrive for your procedure  -Remove all jewelry and leave any valuables before arriving the day of your procedure (all will have to be removed before leaving preop)  -You will need to arrive at 0850on 9/1/23 procedure    -Feel free to contact us at: [phone] with any additional questions/concerns

## 2023-09-01 ENCOUNTER — TELEPHONE (OUTPATIENT)
Dept: GASTROENTEROLOGY | Facility: CLINIC | Age: 73
End: 2023-09-01
Payer: MEDICARE

## 2023-09-01 ENCOUNTER — ANESTHESIA (OUTPATIENT)
Dept: SURGERY | Facility: SURGERY CENTER | Age: 73
End: 2023-09-01
Payer: MEDICARE

## 2023-09-01 ENCOUNTER — ANESTHESIA EVENT (OUTPATIENT)
Dept: SURGERY | Facility: SURGERY CENTER | Age: 73
End: 2023-09-01
Payer: MEDICARE

## 2023-09-01 ENCOUNTER — HOSPITAL ENCOUNTER (OUTPATIENT)
Facility: SURGERY CENTER | Age: 73
Setting detail: HOSPITAL OUTPATIENT SURGERY
Discharge: HOME | End: 2023-09-01
Attending: INTERNAL MEDICINE | Admitting: INTERNAL MEDICINE
Payer: MEDICARE

## 2023-09-01 VITALS
DIASTOLIC BLOOD PRESSURE: 79 MMHG | BODY MASS INDEX: 37.38 KG/M2 | WEIGHT: 267 LBS | TEMPERATURE: 97.2 F | HEIGHT: 71 IN | SYSTOLIC BLOOD PRESSURE: 121 MMHG | OXYGEN SATURATION: 97 % | RESPIRATION RATE: 18 BRPM | HEART RATE: 53 BPM

## 2023-09-01 DIAGNOSIS — Z86.010 PERSONAL HISTORY OF COLONIC POLYPS: ICD-10-CM

## 2023-09-01 DIAGNOSIS — K22.70 BARRETT'S ESOPHAGUS WITHOUT DYSPLASIA: ICD-10-CM

## 2023-09-01 PROCEDURE — 45385 COLONOSCOPY W/LESION REMOVAL: CPT | Performed by: INTERNAL MEDICINE

## 2023-09-01 PROCEDURE — 25010000002 PROPOFOL 10 MG/ML EMULSION: Performed by: ANESTHESIOLOGY

## 2023-09-01 PROCEDURE — 25010000002 PROPOFOL 200 MG/20ML EMULSION: Performed by: ANESTHESIOLOGY

## 2023-09-01 PROCEDURE — 43239 EGD BIOPSY SINGLE/MULTIPLE: CPT | Performed by: INTERNAL MEDICINE

## 2023-09-01 PROCEDURE — 88305 TISSUE EXAM BY PATHOLOGIST: CPT | Performed by: INTERNAL MEDICINE

## 2023-09-01 RX ORDER — SODIUM CHLORIDE, SODIUM LACTATE, POTASSIUM CHLORIDE, CALCIUM CHLORIDE 600; 310; 30; 20 MG/100ML; MG/100ML; MG/100ML; MG/100ML
1000 INJECTION, SOLUTION INTRAVENOUS CONTINUOUS
Status: DISCONTINUED | OUTPATIENT
Start: 2023-09-01 | End: 2023-09-01 | Stop reason: HOSPADM

## 2023-09-01 RX ORDER — MAGNESIUM HYDROXIDE 1200 MG/15ML
LIQUID ORAL AS NEEDED
Status: DISCONTINUED | OUTPATIENT
Start: 2023-09-01 | End: 2023-09-01 | Stop reason: HOSPADM

## 2023-09-01 RX ORDER — PROPOFOL 10 MG/ML
INJECTION, EMULSION INTRAVENOUS AS NEEDED
Status: DISCONTINUED | OUTPATIENT
Start: 2023-09-01 | End: 2023-09-01 | Stop reason: SURG

## 2023-09-01 RX ORDER — SODIUM CHLORIDE, SODIUM LACTATE, POTASSIUM CHLORIDE, CALCIUM CHLORIDE 600; 310; 30; 20 MG/100ML; MG/100ML; MG/100ML; MG/100ML
30 INJECTION, SOLUTION INTRAVENOUS CONTINUOUS
Status: DISCONTINUED | OUTPATIENT
Start: 2023-09-01 | End: 2023-09-01 | Stop reason: HOSPADM

## 2023-09-01 RX ORDER — SODIUM CHLORIDE 0.9 % (FLUSH) 0.9 %
10 SYRINGE (ML) INJECTION AS NEEDED
Status: DISCONTINUED | OUTPATIENT
Start: 2023-09-01 | End: 2023-09-01 | Stop reason: HOSPADM

## 2023-09-01 RX ORDER — LIDOCAINE HYDROCHLORIDE 10 MG/ML
0.5 INJECTION, SOLUTION INFILTRATION; PERINEURAL ONCE AS NEEDED
Status: DISCONTINUED | OUTPATIENT
Start: 2023-09-01 | End: 2023-09-01 | Stop reason: HOSPADM

## 2023-09-01 RX ORDER — LIDOCAINE HYDROCHLORIDE 20 MG/ML
INJECTION, SOLUTION INFILTRATION; PERINEURAL AS NEEDED
Status: DISCONTINUED | OUTPATIENT
Start: 2023-09-01 | End: 2023-09-01 | Stop reason: SURG

## 2023-09-01 RX ADMIN — LIDOCAINE HYDROCHLORIDE 50 MG: 20 INJECTION, SOLUTION INFILTRATION; PERINEURAL at 09:21

## 2023-09-01 RX ADMIN — PROPOFOL 150 MG: 10 INJECTION, EMULSION INTRAVENOUS at 09:21

## 2023-09-01 RX ADMIN — SODIUM CHLORIDE, POTASSIUM CHLORIDE, SODIUM LACTATE AND CALCIUM CHLORIDE 30 ML/HR: 600; 310; 30; 20 INJECTION, SOLUTION INTRAVENOUS at 09:00

## 2023-09-01 RX ADMIN — PROPOFOL INJECTABLE EMULSION 140 MCG/KG/MIN: 10 INJECTION, EMULSION INTRAVENOUS at 09:21

## 2023-09-01 NOTE — TELEPHONE ENCOUNTER
Caller: REMY    Relationship to patient: RECOVERY NURSE AT [facility] call back number: [phone]    Patient is needing: REMY CALLED IN, SHE SAID DR ANAYA DID THE PATIENT'S COLONOSCOPY AND EGD THIS MORNING BUT THERE ARE NO ORDERS IN. PLEASE CONTACT REMY SO SHE CAN GET THOSE ORDERS PLEASE.

## 2023-09-01 NOTE — H&P
[facility]  Pre Procedure History & Physical    Chief Complaint:   Saldana's   Personal hx of colon polyps    Subjective     HPI:   73 y.o. male here for EGD/colonoscopy for hx of Barretts and colon polyps    Past Medical History:   Past Medical History:   Diagnosis Date    Allergic     Allergic rhinitis     Antral gastritis     Saldana's esophagus     Calcaneal spur     COVID-19 January 2021; had infusion    Depression with anxiety     Elevated liver function tests     Emphysema lung     Enlarged aorta     no treatment needed per patient    Erectile dysfunction 2022    GERD (gastroesophageal reflux disease)     Gout     Hypertension     Inability to attain erection     Influenza     Knee osteoarthritis     Osteoarthritis     Osteoporosis     Primary insomnia     Urinary frequency        Past Surgical History:  Past Surgical History:   Procedure Laterality Date    COLONOSCOPY  2010    COLONOSCOPY W/ POLYPECTOMY N/A 12/17/2020    Procedure: COLONOSCOPY TO CECUM WITH COLD AND HOT SNARE POLYPECTOMIES AND COLD BIOPSY POLYPECTOMY;  Surgeon: Vincent Man MD;  Location:  [facility];  Service: Gastroenterology;  Laterality: N/A;  PRE: SCREENING  POST: POLYPS, DIVERTICULOSIS    ENDOSCOPY N/A 12/17/2020    Procedure: ESOPHAGOGASTRODUODENOSCOPY WITH COLD BIOPSIES;  Surgeon: Vincent Man MD;  Location: [facility];  Service: Gastroenterology;  Laterality: N/A;  PRE: HX BARRETTS ESOPHAGUS  POST: BARRETTS ESOPHAGUS, GASTRIC POLYPS, HIATAL HERNIA, GASTRITIS, DUODENAL LYPOMA    ENDOSCOPY N/A 4/15/2021    Procedure: ESOPHAGOGASTRODUODENOSCOPY WITH BIOPSIES;  Surgeon: Vincent Man MD;  Location: [facility];  Service: Gastroenterology;  Laterality: N/A;  PRE OP - H/O SALDANA'S  POST OP -    EYE SURGERY      CATARACTS    HERNIA REPAIR      KNEE ARTHROSCOPY      KNEE CARTILAGE SURGERY Right 11/08/2021       Family History:  Family History   Problem Relation Age  "of Onset    Heart disease Mother     Heart failure Mother     Cancer Father     Cancer Brother     Brad Hyperthermia Neg Hx        Social History:   reports that he quit smoking about 53 years ago. His smoking use included cigarettes and cigars. He has never used smokeless tobacco. He reports current alcohol use of about 10.0 standard drinks per week. He reports that he does not use drugs.    Medications:   Medications Prior to Admission   Medication Sig Dispense Refill Last Dose    escitalopram (Lexapro) 10 MG tablet Take 1 tablet by mouth Daily. 90 tablet 3 8/31/2023    fluticasone (FLONASE) 50 MCG/ACT nasal spray 1 spray into the nostril(s) as directed by provider Daily. 16 g 5 Past Month    ibuprofen (ADVIL,MOTRIN) 600 MG tablet Take 1 tablet by mouth Daily As Needed for Mild Pain.   Past Month    metoprolol tartrate (LOPRESSOR) 50 MG tablet TAKE 1 TABLET BY MOUTH TWO TIMES A  tablet 3 9/1/2023    montelukast (SINGULAIR) 10 MG tablet TAKE 1 TABLET BY MOUTH EVERY NIGHT 90 tablet 3 8/31/2023    pantoprazole (PROTONIX) 40 MG EC tablet TAKE 1 TABLET BY MOUTH TWO TIMES A  tablet 3 8/31/2023    spironolactone (ALDACTONE) 25 MG tablet TAKE 1 TABLET BY MOUTH TWO TIMES A  tablet 3 8/31/2023    cetirizine (zyrTEC) 5 MG tablet Take 1 tablet by mouth Daily.   More than a month       Allergies:  Patient has no known allergies.    ROS:    Pertinent items are noted in HPI     Objective     Blood pressure 137/88, pulse 62, temperature 96.8 °F (36 °C), temperature source Temporal, resp. rate 16, height 180.3 cm (71\"), weight 121 kg (267 lb), SpO2 96 %.    Physical Exam   Constitutional: Pt is oriented to person, place, and time and well-developed, well-nourished, and in no distress.   Mouth/Throat: Oropharynx is clear and moist.   Neck: Normal range of motion.   Cardiovascular: Normal rate, regular rhythm and normal heart sounds.    Pulmonary/Chest: Effort normal and breath sounds normal.   Abdominal: Soft. " Nontender  Skin: Skin is warm and dry.   Psychiatric: Mood, memory, affect and judgment normal.     Assessment & Plan     Diagnosis:  Barretts  Colon polyps    Anticipated Surgical Procedure:  EGD  Colonoscopy    The risks, benefits, and alternatives of this procedure have been discussed with the patient or the responsible party- the patient understands and agrees to proceed.

## 2023-09-01 NOTE — ANESTHESIA POSTPROCEDURE EVALUATION
"Patient: Álvaro Jacques    Procedure Summary       Date: 09/01/23 Room / Location: [facility]    Anesthesia Start: 0918 Anesthesia Stop: 1000    Procedures:       ESOPHAGOGASTRODUODENOSCOPY WITH BIOPSY (Esophagus)      COLONOSCOPY WITH POLYPECTOMY Diagnosis:       Coronel's esophagus without dysplasia      Personal history of colonic polyps      (Coronel's esophagus without dysplasia [K22.70])      (Personal history of colonic polyps [Z86.010])    Surgeons: Vincent Man MD Provider: Rock Cali MD    Anesthesia Type: MAC ASA Status: 3            Anesthesia Type: MAC    Vitals  Vitals Value Taken Time   /71 09/01/23 0959   Temp 36.2 øC (97.2 øF) 09/01/23 0959   Pulse 66 09/01/23 0959   Resp 18 09/01/23 0959   SpO2 99 % 09/01/23 0959           Post Anesthesia Care and Evaluation    Pain management: adequate    Airway patency: patent  Anesthetic complications: No anesthetic complications    Cardiovascular status: acceptable  Respiratory status: acceptable  Hydration status: acceptable    Comments: /71 (BP Location: Left arm, Patient Position: Lying)   Pulse 66   Temp 36.2 øC (97.2 øF) (Temporal)   Resp 18   Ht 180.3 cm (71\")   Wt 121 kg (267 lb)   SpO2 99%   BMI 37.24 kg/mý       "

## 2023-09-01 NOTE — TELEPHONE ENCOUNTER
Called Roxann back and informed her that we do not put in consent orders. Spoke to another nurse Davis that took my name and number to have someone contact me with clarification on what to do.

## 2023-09-01 NOTE — ANESTHESIA PREPROCEDURE EVALUATION
Anesthesia Evaluation                  Airway   Mallampati: II  Dental      Pulmonary    (+) a smoker Former, COPD,  (-) sleep apnea    ROS comment: Negative patient screen for ISHMAEL    Cardiovascular     (+) hypertension      Neuro/Psych  (+) psychiatric history Anxiety  GI/Hepatic/Renal/Endo    (+) obesity, GERD    Musculoskeletal     Abdominal    Substance History      OB/GYN          Other                      Anesthesia Plan    ASA 3     MAC       Anesthetic plan, risks, benefits, and alternatives have been provided, discussed and informed consent has been obtained with: patient.    CODE STATUS:

## 2023-09-05 LAB
LAB AP CASE REPORT: NORMAL
LAB AP CLINICAL INFORMATION: NORMAL
PATH REPORT.FINAL DX SPEC: NORMAL
PATH REPORT.GROSS SPEC: NORMAL

## 2023-09-19 DIAGNOSIS — J30.1 SEASONAL ALLERGIC RHINITIS DUE TO POLLEN: ICD-10-CM

## 2023-09-19 DIAGNOSIS — R05.3 CHRONIC COUGH: ICD-10-CM

## 2023-09-19 RX ORDER — MONTELUKAST SODIUM 10 MG/1
TABLET ORAL
Qty: 90 TABLET | Refills: 3 | Status: SHIPPED | OUTPATIENT
Start: 2023-09-19

## 2023-09-27 ENCOUNTER — TELEPHONE (OUTPATIENT)
Dept: GASTROENTEROLOGY | Facility: CLINIC | Age: 73
End: 2023-09-27
Payer: MEDICARE

## 2023-09-27 NOTE — TELEPHONE ENCOUNTER
----- Message from Vincent Man MD sent at 9/27/2023 10:11 AM EDT -----  Barretts no dysplasia - repeat 3 years  Colon polyps were adenomas - repeat 3 years  Office f/u 6 mos

## 2023-09-27 NOTE — TELEPHONE ENCOUNTER
Pt reviewed results via [employer]. Sent pt [employer] msg advising of results. Advised to call if any questions.     EGD p\laced in HM and recall for 9/1/24.      Colonoscopy placed in [facility] and recall for 9/1/24.

## 2023-10-24 ENCOUNTER — OFFICE VISIT (OUTPATIENT)
Dept: FAMILY MEDICINE CLINIC | Facility: CLINIC | Age: 73
End: 2023-10-24
Payer: MEDICARE

## 2023-10-24 VITALS
BODY MASS INDEX: 38.27 KG/M2 | WEIGHT: 273.4 LBS | HEART RATE: 57 BPM | HEIGHT: 71 IN | SYSTOLIC BLOOD PRESSURE: 110 MMHG | TEMPERATURE: 94.5 F | OXYGEN SATURATION: 98 % | DIASTOLIC BLOOD PRESSURE: 80 MMHG

## 2023-10-24 DIAGNOSIS — R60.0 LOCALIZED EDEMA: ICD-10-CM

## 2023-10-24 DIAGNOSIS — I10 ESSENTIAL HYPERTENSION: Primary | ICD-10-CM

## 2023-10-24 DIAGNOSIS — F33.41 RECURRENT MAJOR DEPRESSIVE DISORDER, IN PARTIAL REMISSION: ICD-10-CM

## 2023-10-24 DIAGNOSIS — K22.70 BARRETT'S ESOPHAGUS WITHOUT DYSPLASIA: ICD-10-CM

## 2023-10-24 DIAGNOSIS — I89.0 LYMPHEDEMA: ICD-10-CM

## 2023-10-24 DIAGNOSIS — F41.1 GAD (GENERALIZED ANXIETY DISORDER): ICD-10-CM

## 2023-10-24 PROCEDURE — 90662 IIV NO PRSV INCREASED AG IM: CPT | Performed by: FAMILY MEDICINE

## 2023-10-24 PROCEDURE — 3074F SYST BP LT 130 MM HG: CPT | Performed by: FAMILY MEDICINE

## 2023-10-24 PROCEDURE — G0008 ADMIN INFLUENZA VIRUS VAC: HCPCS | Performed by: FAMILY MEDICINE

## 2023-10-24 PROCEDURE — 99214 OFFICE O/P EST MOD 30 MIN: CPT | Performed by: FAMILY MEDICINE

## 2023-10-24 PROCEDURE — 3079F DIAST BP 80-89 MM HG: CPT | Performed by: FAMILY MEDICINE

## 2023-10-24 RX ORDER — SPIRONOLACTONE 25 MG/1
25 TABLET ORAL 2 TIMES DAILY
Qty: 180 TABLET | Refills: 3 | Status: SHIPPED | OUTPATIENT
Start: 2023-10-24

## 2023-10-24 RX ORDER — PANTOPRAZOLE SODIUM 40 MG/1
40 TABLET, DELAYED RELEASE ORAL 2 TIMES DAILY
Qty: 180 TABLET | Refills: 3 | Status: SHIPPED | OUTPATIENT
Start: 2023-10-24

## 2023-10-24 NOTE — PROGRESS NOTES
Chief Complaint   Patient presents with    Hypertension       Subjective   Álvaro Jacques is a 73 y.o. male.     Hypertension  Pertinent negatives include no blurred vision, chest pain or shortness of breath.      F/U htn.  Doing well with meds.    F/U ELEAZAR with depression.  Doing well with meds.   C/o swelling in B legs, L greater than R.     The following portions of the patient's history were reviewed and updated as appropriate: allergies, current medications, past family history, past medical history, past social history, past surgical history and problem list.    Review of Systems   Constitutional:  Negative for appetite change and fatigue.   HENT:  Negative for nosebleeds and sore throat.    Eyes:  Negative for blurred vision and visual disturbance.   Respiratory:  Negative for shortness of breath and wheezing.    Cardiovascular:  Positive for leg swelling. Negative for chest pain.   Gastrointestinal:  Negative for abdominal distention and abdominal pain.   Endocrine: Negative for cold intolerance and polyuria.   Genitourinary:  Negative for dysuria and hematuria.   Musculoskeletal:  Negative for arthralgias and myalgias.   Skin:  Negative for color change and rash.   Neurological:  Negative for weakness and confusion.   Psychiatric/Behavioral:  Negative for agitation and depressed mood.        Patient Active Problem List   Diagnosis    Seasonal allergic rhinitis due to pollen    Antral gastritis    Coronel's esophagus without dysplasia    Recurrent major depressive disorder, in partial remission    Elevated liver function tests    Other emphysema    Essential hypertension    Chronic gout of multiple sites    Osteoarthritis of both knees    Primary insomnia    Benign prostatic hyperplasia with urinary frequency    Immunization deficiency    Non-intractable vomiting with nausea    Medicare annual wellness visit, subsequent    Hyperamylasemia    ELEAZAR (generalized anxiety disorder)    Bronchitis    Chronic cough     Colon cancer screening    Hyperglycemia    Clinical diagnosis of COVID-19    Localized edema    Venous insufficiency    Preoperative clearance    Obesity (BMI 30-39.9)    Encounter for hepatitis C screening test for low risk patient    Hyperhidrosis    Personal history of colonic polyps    Lymphedema       No Known Allergies      Current Outpatient Medications:     cetirizine (zyrTEC) 5 MG tablet, Take 1 tablet by mouth Daily., Disp: , Rfl:     escitalopram (Lexapro) 10 MG tablet, Take 1 tablet by mouth Daily., Disp: 90 tablet, Rfl: 3    fluticasone (FLONASE) 50 MCG/ACT nasal spray, 1 spray into the nostril(s) as directed by provider Daily., Disp: 16 g, Rfl: 5    ibuprofen (ADVIL,MOTRIN) 600 MG tablet, Take 1 tablet by mouth Daily As Needed for Mild Pain., Disp: , Rfl:     metoprolol tartrate (LOPRESSOR) 50 MG tablet, TAKE 1 TABLET BY MOUTH TWO TIMES A DAY, Disp: 180 tablet, Rfl: 3    montelukast (SINGULAIR) 10 MG tablet, TAKE 1 TABLET BY MOUTH EVERY DAY AT NIGHT, Disp: 90 tablet, Rfl: 3    pantoprazole (PROTONIX) 40 MG EC tablet, Take 1 tablet by mouth 2 (Two) Times a Day., Disp: 180 tablet, Rfl: 3    spironolactone (ALDACTONE) 25 MG tablet, Take 1 tablet by mouth 2 (Two) Times a Day., Disp: 180 tablet, Rfl: 3    Past Medical History:   Diagnosis Date    Allergic     Allergic rhinitis     Antral gastritis     Coronel's esophagus     Calcaneal spur     COVID-19     January 2021; had infusion    Depression with anxiety     Elevated liver function tests     Emphysema lung     Enlarged aorta     no treatment needed per patient    Erectile dysfunction 2022    GERD (gastroesophageal reflux disease)     Gout     Hypertension     Inability to attain erection     Influenza     Knee osteoarthritis     Osteoarthritis     Osteoporosis     Primary insomnia     Urinary frequency        Past Surgical History:   Procedure Laterality Date    COLONOSCOPY  2010    COLONOSCOPY W/ POLYPECTOMY N/A 12/17/2020    Procedure: COLONOSCOPY TO  CECUM WITH COLD AND HOT SNARE POLYPECTOMIES AND COLD BIOPSY POLYPECTOMY;  Surgeon: Vincent Man MD;  Location:  [facility];  Service: Gastroenterology;  Laterality: N/A;  PRE: SCREENING  POST: POLYPS, DIVERTICULOSIS    COLONOSCOPY W/ POLYPECTOMY N/A 2023    Procedure: COLONOSCOPY WITH POLYPECTOMY;  Surgeon: Vincent Man MD;  Location: [facility];  Service: Gastroenterology;  Laterality: N/A;  polyps, diverticulosis    ENDOSCOPY N/A 2020    Procedure: ESOPHAGOGASTRODUODENOSCOPY WITH COLD BIOPSIES;  Surgeon: Vincent Man MD;  Location:  [facility];  Service: Gastroenterology;  Laterality: N/A;  PRE: HX BARRETTS ESOPHAGUS  POST: BARRETTS ESOPHAGUS, GASTRIC POLYPS, HIATAL HERNIA, GASTRITIS, DUODENAL LYPOMA    ENDOSCOPY N/A 4/15/2021    Procedure: ESOPHAGOGASTRODUODENOSCOPY WITH BIOPSIES;  Surgeon: Vincent Man MD;  Location:  [facility];  Service: Gastroenterology;  Laterality: N/A;  PRE OP - H/O SALDANA'S  POST OP -    ENDOSCOPY N/A 2023    Procedure: ESOPHAGOGASTRODUODENOSCOPY WITH BIOPSY;  Surgeon: Vincent Man MD;  Location: [facility];  Service: Gastroenterology;  Laterality: N/A;  Gastric polyps, Barretts Esophagus    EYE SURGERY      CATARACTS    HERNIA REPAIR      KNEE ARTHROSCOPY      KNEE CARTILAGE SURGERY Right 2021       Family History   Problem Relation Age of Onset    Heart disease Mother     Heart failure Mother     Cancer Father     Cancer Brother     Malig Hyperthermia Neg Hx        Social History     Tobacco Use    Smoking status: Former     Packs/day: 0.00     Years: 0.00     Additional pack years: 0.00     Total pack years: 0.00     Types: Cigarettes, Cigars     Quit date: 1970     Years since quittin.2    Smokeless tobacco: Never    Tobacco comments:     still smokes cigars occasionally    Substance Use Topics    Alcohol use: Yes     Alcohol/week: 10.0 standard drinks of alcohol     Types: 10 Shots  of liquor per week     Comment: per week//Caffeine use: 2 cups daily             Objective     Vitals:    10/24/23 0914   BP: 110/80   Pulse: 57   Temp: 94.5 °F (34.7 °C)   SpO2: 98%     Body mass index is 38.15 kg/m².    Physical Exam  Vitals reviewed.   Constitutional:       Appearance: He is well-developed. He is not diaphoretic.   HENT:      Head: Normocephalic and atraumatic.   Eyes:      General: No scleral icterus.     Pupils: Pupils are equal, round, and reactive to light.   Neck:      Thyroid: No thyromegaly.   Cardiovascular:      Rate and Rhythm: Normal rate and regular rhythm.      Heart sounds: No murmur heard.     No friction rub. No gallop.   Pulmonary:      Effort: Pulmonary effort is normal. No respiratory distress.      Breath sounds: No wheezing or rales.   Chest:      Chest wall: No tenderness.   Abdominal:      General: Bowel sounds are normal. There is no distension.      Palpations: Abdomen is soft.      Tenderness: There is no abdominal tenderness.   Musculoskeletal:         General: No deformity. Normal range of motion.   Lymphadenopathy:      Cervical: No cervical adenopathy.   Skin:     General: Skin is warm and dry.      Findings: No rash.      Comments: Generalized non pitting edema B legs, L greater than R.     Neurological:      Cranial Nerves: No cranial nerve deficit.      Motor: No abnormal muscle tone.         Lab Results   Component Value Date    GLUCOSE 112 (H) 04/26/2023    BUN 11 04/26/2023    CREATININE 0.95 04/26/2023    EGFRIFNONA 87 05/10/2021    EGFRIFAFRI 106 05/10/2021    BCR 11.6 04/26/2023    K 4.6 04/26/2023    CO2 26.1 04/26/2023    CALCIUM 9.4 04/26/2023    PROTENTOTREF 6.8 04/26/2023    ALBUMIN 4.5 04/26/2023    LABIL2 2.0 04/26/2023    AST 22 04/26/2023    ALT 16 04/26/2023       WBC   Date Value Ref Range Status   04/21/2022 6.6 3.4 - 10.8 x10E3/uL Final   09/29/2021 6.55 4.5 - 11.0 10*3/uL Final     RBC   Date Value Ref Range Status   04/21/2022 5.29 4.14 - 5.80  x10E6/uL Final   09/29/2021 5.20 4.5 - 5.9 10*6/uL Final     Hemoglobin   Date Value Ref Range Status   04/21/2022 15.6 13.0 - 17.7 g/dL Final   09/29/2021 15.7 13.5 - 17.5 g/dL Final     Hematocrit   Date Value Ref Range Status   04/21/2022 45.7 37.5 - 51.0 % Final   09/29/2021 44.3 41.0 - 53.0 % Final     MCV   Date Value Ref Range Status   04/21/2022 86 79 - 97 fL Final   09/29/2021 85.2 80.0 - 100.0 fL Final     MCH   Date Value Ref Range Status   04/21/2022 29.5 26.6 - 33.0 pg Final   09/29/2021 30.2 26.0 - 34.0 pg Final     MCHC   Date Value Ref Range Status   04/21/2022 34.1 31.5 - 35.7 g/dL Final   09/29/2021 35.4 31.0 - 37.0 g/dL Final     RDW   Date Value Ref Range Status   04/21/2022 14.8 11.6 - 15.4 % Final   09/29/2021 15.3 12.0 - 16.8 % Final     MPV   Date Value Ref Range Status   09/29/2021 9.1 8.4 - 12.4 fL Final     Platelets   Date Value Ref Range Status   04/21/2022 186 150 - 450 x10E3/uL Final   09/29/2021 176 140 - 440 10*3/uL Final     Neutrophil Rel %   Date Value Ref Range Status   04/21/2022 70 Not Estab. % Final   09/29/2021 73.8 45 - 80 % Final     Lymphocyte Rel %   Date Value Ref Range Status   04/21/2022 18 Not Estab. % Final   09/29/2021 15.9 15 - 50 % Final     Monocyte Rel %   Date Value Ref Range Status   04/21/2022 8 Not Estab. % Final   09/29/2021 7.3 0 - 15 % Final     Eosinophil Rel %   Date Value Ref Range Status   04/21/2022 2 Not Estab. % Final     Eosinophil %   Date Value Ref Range Status   09/29/2021 1.1 0 - 7 % Final     Basophil Rel %   Date Value Ref Range Status   04/21/2022 2 Not Estab. % Final   09/29/2021 1.4 0 - 2 % Final     Immature Grans %   Date Value Ref Range Status   09/29/2021 0.5 0.0 - 1.0 % Final     Neutrophils Absolute   Date Value Ref Range Status   04/21/2022 4.7 1.4 - 7.0 x10E3/uL Final   09/29/2021 4.84 2.0 - 8.8 10*3/uL Final     Lymphocytes Absolute   Date Value Ref Range Status   04/21/2022 1.2 0.7 - 3.1 x10E3/uL Final   09/29/2021 1.04 0.7 -  "5.5 10*3/uL Final     Monocytes Absolute   Date Value Ref Range Status   04/21/2022 0.5 0.1 - 0.9 x10E3/uL Final   09/29/2021 0.48 0.0 - 1.7 10*3/uL Final     Eosinophils Absolute   Date Value Ref Range Status   04/21/2022 0.1 0.0 - 0.4 x10E3/uL Final   09/29/2021 0.07 0.0 - 0.8 10*3/uL Final     Basophils Absolute   Date Value Ref Range Status   04/21/2022 0.1 0.0 - 0.2 x10E3/uL Final   09/29/2021 0.09 0.0 - 0.2 10*3/uL Final     Immature Grans, Absolute   Date Value Ref Range Status   09/29/2021 0.03 0.00 - 0.10 10*3/uL Final     nRBC   Date Value Ref Range Status   09/29/2021 0 0 /100(WBC) Final       Lab Results   Component Value Date    HGBA1C 4.70 (L) 04/26/2023       No results found for: \"NFMKRTXT16\"    TSH   Date Value Ref Range Status   07/30/2019 2.850 0.270 - 4.200 mIU/mL Final       No results found for: \"CHOL\"  Lab Results   Component Value Date    TRIG 58 04/26/2023     Lab Results   Component Value Date    HDL 71 (H) 04/26/2023     Lab Results   Component Value Date    LDL 46 04/26/2023     Lab Results   Component Value Date    VLDL 13 04/26/2023     No results found for: \"LDLHDL\"      Procedures    Assessment & Plan   Problems Addressed this Visit       Coronel's esophagus without dysplasia    Relevant Medications    pantoprazole (PROTONIX) 40 MG EC tablet    Recurrent major depressive disorder, in partial remission    Essential hypertension - Primary    Relevant Medications    spironolactone (ALDACTONE) 25 MG tablet    Other Relevant Orders    Comprehensive Metabolic Panel    ELEAZAR (generalized anxiety disorder)    Localized edema    Relevant Medications    spironolactone (ALDACTONE) 25 MG tablet    Lymphedema    Relevant Orders    Ambulatory Referral to Lymphedema Clinic     Diagnoses         Codes Comments    Essential hypertension    -  Primary ICD-10-CM: I10  ICD-9-CM: 401.9     Recurrent major depressive disorder, in partial remission     ICD-10-CM: F33.41  ICD-9-CM: 296.35     ELEAZAR (generalized " "anxiety disorder)     ICD-10-CM: F41.1  ICD-9-CM: 300.02     Coronel's esophagus without dysplasia     ICD-10-CM: K22.70  ICD-9-CM: 530.85     Localized edema     ICD-10-CM: R60.0  ICD-9-CM: 782.3     Lymphedema     ICD-10-CM: I89.0  ICD-9-CM: 457.1           HTN.  Controlled.  Continue spironolactone 25 BID.  Check CMP.  RF spironolactone.  Coronel's without dysplasia on EGD . Continue BID PPI.  Depression with ELEAZAR.  Controlled.  Continue lexapro.    Lymphedema.  Uncontrolled.    To lymphedema clinic.   Orders Placed This Encounter   Procedures    Fluzone High-Dose 65+yrs ([id number])    Comprehensive Metabolic Panel     Order Specific Question:   Release to patient     Answer:   Routine Release [[id number]]    Ambulatory Referral to Lymphedema Clinic     Referral Priority:   Routine     Referral Type:   Physical Therapy     Number of Visits Requested:   1       Current Outpatient Medications   Medication Sig Dispense Refill    cetirizine (zyrTEC) 5 MG tablet Take 1 tablet by mouth Daily.      escitalopram (Lexapro) 10 MG tablet Take 1 tablet by mouth Daily. 90 tablet 3    fluticasone (FLONASE) 50 MCG/ACT nasal spray 1 spray into the nostril(s) as directed by provider Daily. 16 g 5    ibuprofen (ADVIL,MOTRIN) 600 MG tablet Take 1 tablet by mouth Daily As Needed for Mild Pain.      metoprolol tartrate (LOPRESSOR) 50 MG tablet TAKE 1 TABLET BY MOUTH TWO TIMES A  tablet 3    montelukast (SINGULAIR) 10 MG tablet TAKE 1 TABLET BY MOUTH EVERY DAY AT NIGHT 90 tablet 3    pantoprazole (PROTONIX) 40 MG EC tablet Take 1 tablet by mouth 2 (Two) Times a Day. 180 tablet 3    spironolactone (ALDACTONE) 25 MG tablet Take 1 tablet by mouth 2 (Two) Times a Day. 180 tablet 3     No current facility-administered medications for this visit.       Álvaro Jacques \"Bill\" had no medications administered during this visit.    Return in about 6 months (around 4/29/2024).    There are no Patient Instructions on file for this visit.     "

## 2023-10-25 LAB
ALBUMIN SERPL-MCNC: 4.5 G/DL (ref 3.5–5.2)
ALBUMIN/GLOB SERPL: 2.3 G/DL
ALP SERPL-CCNC: 59 U/L (ref 39–117)
ALT SERPL-CCNC: 18 U/L (ref 1–41)
AST SERPL-CCNC: 19 U/L (ref 1–40)
BILIRUB SERPL-MCNC: 1.8 MG/DL (ref 0–1.2)
BUN SERPL-MCNC: 11 MG/DL (ref 8–23)
BUN/CREAT SERPL: 12.8 (ref 7–25)
CALCIUM SERPL-MCNC: 9.1 MG/DL (ref 8.6–10.5)
CHLORIDE SERPL-SCNC: 101 MMOL/L (ref 98–107)
CO2 SERPL-SCNC: 26.3 MMOL/L (ref 22–29)
CREAT SERPL-MCNC: 0.86 MG/DL (ref 0.76–1.27)
EGFRCR SERPLBLD CKD-EPI 2021: 92.6 ML/MIN/1.73
GLOBULIN SER CALC-MCNC: 2 GM/DL
GLUCOSE SERPL-MCNC: 106 MG/DL (ref 65–99)
POTASSIUM SERPL-SCNC: 4.5 MMOL/L (ref 3.5–5.2)
PROT SERPL-MCNC: 6.5 G/DL (ref 6–8.5)
SODIUM SERPL-SCNC: 138 MMOL/L (ref 136–145)

## 2023-11-20 ENCOUNTER — HOSPITAL ENCOUNTER (OUTPATIENT)
Dept: PHYSICAL THERAPY | Facility: HOSPITAL | Age: 73
Setting detail: THERAPIES SERIES
Discharge: HOME | End: 2023-11-20
Payer: MEDICARE

## 2023-11-20 DIAGNOSIS — I89.0 LYMPHEDEMA: Primary | ICD-10-CM

## 2023-11-20 PROCEDURE — 97162 PT EVAL MOD COMPLEX 30 MIN: CPT

## 2023-11-20 NOTE — THERAPY EVALUATION
Physical Therapy Lymphedema Initial Evaluation  [facility]     Patient Name: Álvaro Jacques  : 1950  MRN: [id number]  Today's Date: 2023      Visit Date: 2023    Visit Dx:    ICD-10-CM ICD-9-CM   1. Lymphedema  I89.0 457.1       Patient Active Problem List   Diagnosis    Seasonal allergic rhinitis due to pollen    Antral gastritis    Coronel's esophagus without dysplasia    Recurrent major depressive disorder, in partial remission    Elevated liver function tests    Other emphysema    Essential hypertension    Chronic gout of multiple sites    Osteoarthritis of both knees    Primary insomnia    Benign prostatic hyperplasia with urinary frequency    Immunization deficiency    Non-intractable vomiting with nausea    Medicare annual wellness visit, subsequent    Hyperamylasemia    ELEAZAR (generalized anxiety disorder)    Bronchitis    Chronic cough    Colon cancer screening    Hyperglycemia    Clinical diagnosis of COVID-19    Localized edema    Venous insufficiency    Preoperative clearance    Obesity (BMI 30-39.9)    Encounter for hepatitis C screening test for low risk patient    Hyperhidrosis    Personal history of colonic polyps    Lymphedema        Past Medical History:   Diagnosis Date    Allergic     Allergic rhinitis     Antral gastritis     Coronel's esophagus     Calcaneal spur     COVID-19     2021; had infusion    Depression with anxiety     Elevated liver function tests     Emphysema lung     Enlarged aorta     no treatment needed per patient    Erectile dysfunction     GERD (gastroesophageal reflux disease)     Gout     Hypertension     Inability to attain erection     Influenza     Knee osteoarthritis     Non-intractable vomiting with nausea 2019    Osteoarthritis     Osteoporosis     Primary insomnia     Urinary frequency         Past Surgical History:   Procedure Laterality Date    COLONOSCOPY      COLONOSCOPY W/ POLYPECTOMY N/A 2020    Procedure:  COLONOSCOPY TO CECUM WITH COLD AND HOT SNARE POLYPECTOMIES AND COLD BIOPSY POLYPECTOMY;  Surgeon: Vincent Man MD;  Location:  [facility];  Service: Gastroenterology;  Laterality: N/A;  PRE: SCREENING  POST: POLYPS, DIVERTICULOSIS    COLONOSCOPY W/ POLYPECTOMY N/A 9/1/2023    Procedure: COLONOSCOPY WITH POLYPECTOMY;  Surgeon: Vincent Man MD;  Location: [facility];  Service: Gastroenterology;  Laterality: N/A;  polyps, diverticulosis    ENDOSCOPY N/A 12/17/2020    Procedure: ESOPHAGOGASTRODUODENOSCOPY WITH COLD BIOPSIES;  Surgeon: Vincent Man MD;  Location:  [facility];  Service: Gastroenterology;  Laterality: N/A;  PRE: HX BARRETTS ESOPHAGUS  POST: BARRETTS ESOPHAGUS, GASTRIC POLYPS, HIATAL HERNIA, GASTRITIS, DUODENAL LYPOMA    ENDOSCOPY N/A 4/15/2021    Procedure: ESOPHAGOGASTRODUODENOSCOPY WITH BIOPSIES;  Surgeon: Vincent Man MD;  Location:  [facility];  Service: Gastroenterology;  Laterality: N/A;  PRE OP - H/O SALDANA'S  POST OP -    ENDOSCOPY N/A 9/1/2023    Procedure: ESOPHAGOGASTRODUODENOSCOPY WITH BIOPSY;  Surgeon: Vincent Man MD;  Location: [facility];  Service: Gastroenterology;  Laterality: N/A;  Gastric polyps, Barretts Esophagus    EYE SURGERY      CATARACTS    HERNIA REPAIR      KNEE ARTHROSCOPY      KNEE CARTILAGE SURGERY Right 11/08/2021       Visit Dx:    ICD-10-CM ICD-9-CM   1. Lymphedema  I89.0 457.1        Patient History       Row Name 11/20/23 1500             History    Chief Complaint Swelling;Pain  -KA      Date Current Problem(s) Began --  Roughly 2 years ago  -KA      Brief Description of Current Complaint Pt reports he began to have swelling in his legs about 2 years ago, insidious onset.  He reports that it has gradually over time, swelling at this time has remained below the knees.  Legs are beginning to have pain and feel weak related to swelling.  Reports that when he elevates legs overnight, his right leg goes  down to near normal size, left leg swelling improves with elevation but always remains swelling.  He has been taking Lasix for over a year but it has not resolved this swelling.  Has tried to use compression socks but could not wear them due to difficulty with application and how tight they are.  Has been having burning in his feet for roughly the past 6 months.  Referred to our clinic for treatment.  -KA      Patient/Caregiver Goals Relieve pain;Return to prior level of function;Decrease swelling;Know what to do to help the symptoms  -KA      Occupation/sports/leisure activities Retired, but active  -KA      How has patient tried to help current problem? Elevation, wearing over the counter compression socks  -KA         Pain     Pain Location Leg;Foot  -KA      Pain at Present 7  -KA      Pain at Best 7  -KA      Pain at Worst 8  -KA      Pain Frequency Constant/continuous  -KA      Pain Description Burning;Aching  -KA      What Performance Factors Make the Current Problem(s) WORSE? Climbing steps, walking  -KA      What Performance Factors Make the Current Problem(s) BETTER? Elevating legs  -KA      Is your sleep disturbed? Yes  -KA      Difficulties with ADL's? Very cautious with dressing/bathing, limited with walking especially over uneven surfaces, difficulty navigating stairs, unable to wear shoes other than crocs, some difficulty with donning socks (wife helps sometimes)  -KA      Difficulties with recreational activities? Difficulty getting in/out of boat for fishing, unable to play golf  -KA         Fall Risk Assessment    Any falls in the past year: No  -KA         Services    Are you currently receiving Home Health services No  -KA      Do you plan to receive Home Health services in the near future No  -KA         Daily Activities    Primary Language English  -KA      Are you able to read Yes  -KA      Are you able to write Yes  -KA      How does patient learn best? Reading  -KA      Teaching needs  identified Home Exercise Program;Management of Condition  -KA      Patient is concerned about/has problems with Climbing Stairs;Difficulty with self care (i.e. bathing, dressing, toileting:;Performing home management (household chores, shopping, care of dependents);Standing;Sitting;Transfers (getting out of a chair, bed);Walking  -KA      Does patient have problems with the following? Depression;Anxiety  -KA      Pt Participated in POC and Goals Yes  -KA         Safety    Are you being hurt, hit, or frightened by anyone at home or in your life? No  -KA      Are you being neglected by a caregiver No  -KA      Have you had any of the following issues with Depression;Anxiety  -KA                User Key  (r) = Recorded By, (t) = Taken By, (c) = Cosigned By      Initials Name Provider Type    Ruth Ann Shea, PT Physical Therapist                     Lymphedema       Row Name 11/20/23 7638             Subjective Pain    Able to rate subjective pain? yes  -KA      Pre-Treatment Pain Level 7  -KA      Post-Treatment Pain Level 7  -KA      Subjective Pain Comment Legs/feet  -KA         Subjective    Subjective Comments See initial evaluation  -KA         Lymphedema Assessment    Lymphedema Classification RLE:;LLE:;secondary;stage 2 (Spontaneously Irreversible)  -KA      Lymphedema Assessment Comments Mild-moderate lymphedema affecting BLE, worst in ankles and lower 1/3 of legs  -KA         Posture/Observations    Posture/Observations Comments Pt ambulates slowly, decreased stride length, mild instability, decreased ankle ROM bilaterally  -KA         General ROM    GENERAL ROM COMMENTS Able to reach feet to don socks/shoes with difficulty, ankle dorsiflexion limited to neutral bilaterally  -KA         MMT (Manual Muscle Testing)    General MMT Comments BLE strength at least 3+/5  -KA         Lymphedema Edema Assessment    Ptting Edema Category By grade out of 4  -KA      Pitting Edema + 3/4  -KA      Stemmer Sign  bilateral:;negative  -KA      Story Hump bilateral:;negative  -KA      Edema Assessment Comment Mild-moderate lymphedema affecting BLE, worst in ankles and lower 1/3 of leg  -KA         Skin Changes/Observations    Skin Observations Comment Skin is intact, some purple coloration around ankles but otherwise normal coloration, skin is dry and flaky.  Minimal fibrotic changes noted around ankles bilaterally  -KA         Lymphedema Sensation    Lymphedema Sensation Reports RLE:;LLE:;numbness  -KA      Lymphedema Sensation Tests light touch  -KA      Lymphedema Light Touch RLE:;LLE:;WNL  -KA                User Key  (r) = Recorded By, (t) = Taken By, (c) = Cosigned By      Initials Name Provider Type    Ruth Ann Shea, PT Physical Therapist                                    Therapy Education  Education Details: Pt was educated regarding components of complete decongestive therapy including skin care, manual lymph drainage, compression bandaging, and decongestive exercise.  Expectations were discussed regarding patient responsibilities during Phase I (intensive skilled therapy) and Phase 2 (self management Phase) of CDT.  Pt was educated regarding skin care including use of low pH lotion as well as performing ankle pumps to improve circulation.  Discussed that insurance should cover bandaging, donning aids, and garments starting in January 2024, showed him velcro compression system and how to use [employer].  Given: Symptoms/condition management, Edema management  Program: New  How Provided: Verbal  Provided to: Patient  Level of Understanding: Verbalized       OP Exercises       Row Name 11/20/23 7737             Subjective    Subjective Comments See initial evaluation  -KA         Subjective Pain    Able to rate subjective pain? yes  -KA      Pre-Treatment Pain Level 7  -KA      Post-Treatment Pain Level 7  -KA      Subjective Pain Comment Legs/feet  -KA                User Key  (r) = Recorded By, (t) =  Taken By, (c) = Cosigned By      Initials Name Provider Type    Ruth Ann Shea, PT Physical Therapist                                 PT OP Goals       Row Name 11/20/23 1700          PT Short Term Goals    STG Date to Achieve 12/04/23  -     STG 1 Patient to demonstrate awareness of condition and precautions for improved prevention, management, care of symptoms, and ease of transition to self care of condition.  -KA     STG 1 Progress New  -     STG 2 Patient/family independent with self-wrapping techniques of compression bandages as indicated for improved self-management of condition.  -KA     STG 2 Progress New  -     STG 3 Patient demonstrates decreased net edema of Bilateral Lower Extremity>/= 5-10 cm for decreased edema symptoms, decreased risk of infection, and improved skin care.  -     STG 3 Progress New  -        Long Term Goals    LTG Date to Achieve 01/01/24  -     LTG 1 Patient/family independent with self-care techniques for self-management of condition.  -KA     LTG 1 Progress New  -     LTG 2 Patient/family independent with compression garments as indicated for self-management of condition.  -     LTG 2 Progress New  -     LTG 3 Patient demonstrates decreased net edema of Bilateral Lower Extremity >/= 10-20 cm for decreased edema symptoms, decreased risk of infection, and improved skin care.  -     LTG 3 Progress New  [facility]        Time Calculation    PT Goal Re-Cert Due Date 02/18/24  -               User Key  (r) = Recorded By, (t) = Taken By, (c) = Cosigned By      Initials Name Provider Type    Ruth Ann Shea, PT Physical Therapist                     PT Assessment/Plan       Row Name 11/20/23 1719 11/20/23 1715       PT Assessment    Functional Limitations -- Impaired gait;Limitation in home management;Limitations in community activities;Limitations in functional capacity and performance;Performance in leisure activities;Performance in self-care ADL  -     Impairments -- Edema;Balance;Endurance;Gait;Impaired lymphatic circulation;Integumentary integrity;Muscle strength;Pain;Range of motion;Sensation  -KA    Assessment Comments 73 y.o. male who presents with bilateral Lower Extremity lymphedema.  Presentation is consistent with secondary Stage 2. Lymphedema is present from feet to knees and is characterized by (-) Stemmer sign, +3 pitting edema, dry skin, mild fibrotic skin changes around ankles, and loss of normal ankle contours.  Impairments include gait deviations, decreased ankle mobility, LE weakness, edema, decreased skin integrity, and decreased balance.  Pt is limited with ability to walk, stand, climb stairs, bathe and dress, get into boat to go fishing, golf, and wear shoes due to lymphedema.  Score on Lymphedema Life Impact Scale is 82.25.  Patient is a good candidate for complete decongestive therapy to reduce infection risk, improve skin condition, and promote self-management of condition.  Following education, patient agrees to participate in Phase I (skilled care) and Phase II (self-management) of CDT.  Anticipate that he will need a velcro compression system and [employer] for long term compression.  -KA --    Rehab Potential Good  -KA --    Patient/caregiver participated in establishment of treatment plan and goals Yes  -KA --    Patient would benefit from skilled therapy intervention Yes  -KA --       PT Plan    PT Frequency 3x/week  -KA --    Predicted Duration of Therapy Intervention (PT) 4-6 weeks  -KA --    Planned CPT's? PT EVAL MOD COMPLELITY: 20512;PT RE-EVAL: 72263;PT THER PROC EA 15 MIN: 92875;PT THER ACT EA 15 MIN: 73624;PT MANUAL THERAPY EA 15 MIN: 51882;PT NEUROMUSC RE-EDUCATION EA 15 MIN: 12107;PT GAIT TRAINING EA 15 MIN: 69238;PT SELF CARE/HOME MGMT/TRAIN EA 15: 43195  -KA --    Physical Therapy Interventions (Optional Details) bandaging;home exercise program;manual lymphatic drainage;manual therapy techniques;patient/family  education  -EUN --    PT Plan Comments Begin CDT.  -EUN --              User Key  (r) = Recorded By, (t) = Taken By, (c) = Cosigned By      Initials Name Provider Type    Ruth Ann Shea, PT Physical Therapist                                 Time Calculation:   Start Time: 1517  Stop Time: 1615  Time Calculation (min): 58 min  Untimed Charges  PT Eval/Re-eval Minutes: 58  Total Minutes  Untimed Charges Total Minutes: 58   Total Minutes: 58   Therapy Charges for Today       Code Description Service Date Service Provider Modifiers Qty    [id number] HC PT EVAL MOD COMPLEXITY 4 11/20/2023 Ruth Ann Dasilva, PT GP 1                      Ruth Ann Dasilva, PT  11/20/2023

## 2024-01-09 ENCOUNTER — TELEMEDICINE (OUTPATIENT)
Dept: FAMILY MEDICINE CLINIC | Facility: CLINIC | Age: 74
End: 2024-01-09
Payer: MEDICARE

## 2024-01-09 DIAGNOSIS — J11.1 INFLUENZA: Primary | ICD-10-CM

## 2024-01-09 DIAGNOSIS — J43.8 OTHER EMPHYSEMA: ICD-10-CM

## 2024-01-09 RX ORDER — OSELTAMIVIR PHOSPHATE 75 MG/1
75 CAPSULE ORAL 2 TIMES DAILY
Qty: 10 CAPSULE | Refills: 0 | Status: SHIPPED | OUTPATIENT
Start: 2024-01-09

## 2024-01-09 NOTE — PROGRESS NOTES
Complaint of bodyaches and cough.    Subjective   Álvaro Jacques is a 73 y.o. male.     History of Present Illness   Video visit due to patient unable come the office due to transportation issues and illness.  Consent obtained.  Patient is at home.  Providers in the office.  7-minute visit.  Patient complains of 2-day history of cough congestion and bodyaches.  He was around friends 2 to 3 days ago who have subsequently been diagnosed with influenza A.  Patient has taken a home COVID test and it was negative.  The following portions of the patient's history were reviewed and updated as appropriate: allergies, current medications, past family history, past medical history, past social history, past surgical history and problem list.    Review of Systems   Constitutional:  Positive for fatigue and fever. Negative for appetite change.   HENT:  Positive for rhinorrhea. Negative for nosebleeds and sore throat.    Eyes:  Negative for blurred vision and visual disturbance.   Respiratory:  Positive for cough. Negative for shortness of breath and wheezing.    Cardiovascular:  Negative for chest pain and leg swelling.   Gastrointestinal:  Negative for abdominal distention and abdominal pain.   Endocrine: Negative for cold intolerance and polyuria.   Genitourinary:  Negative for dysuria and hematuria.   Musculoskeletal:  Negative for arthralgias and myalgias.   Skin:  Negative for color change and rash.   Neurological:  Negative for weakness and confusion.   Psychiatric/Behavioral:  Negative for agitation and depressed mood.        Patient Active Problem List   Diagnosis    Seasonal allergic rhinitis due to pollen    Antral gastritis    Coronel's esophagus without dysplasia    Recurrent major depressive disorder, in partial remission    Elevated liver function tests    Other emphysema    Essential hypertension    Chronic gout of multiple sites    Osteoarthritis of both knees    Primary insomnia    Benign prostatic hyperplasia  with urinary frequency    Immunization deficiency    Non-intractable vomiting with nausea    Medicare annual wellness visit, subsequent    Hyperamylasemia    ELEZAAR (generalized anxiety disorder)    Bronchitis    Chronic cough    Colon cancer screening    Hyperglycemia    Clinical diagnosis of COVID-19    Localized edema    Venous insufficiency    Preoperative clearance    Obesity (BMI 30-39.9)    Encounter for hepatitis C screening test for low risk patient    Hyperhidrosis    Personal history of colonic polyps    Lymphedema    Influenza       No Known Allergies      Current Outpatient Medications:     cetirizine (zyrTEC) 5 MG tablet, Take 1 tablet by mouth Daily., Disp: , Rfl:     escitalopram (Lexapro) 10 MG tablet, Take 1 tablet by mouth Daily., Disp: 90 tablet, Rfl: 3    fluticasone (FLONASE) 50 MCG/ACT nasal spray, 1 spray into the nostril(s) as directed by provider Daily., Disp: 16 g, Rfl: 5    ibuprofen (ADVIL,MOTRIN) 600 MG tablet, Take 1 tablet by mouth Daily As Needed for Mild Pain., Disp: , Rfl:     metoprolol tartrate (LOPRESSOR) 50 MG tablet, TAKE 1 TABLET BY MOUTH TWO TIMES A DAY, Disp: 180 tablet, Rfl: 3    montelukast (SINGULAIR) 10 MG tablet, TAKE 1 TABLET BY MOUTH EVERY DAY AT NIGHT, Disp: 90 tablet, Rfl: 3    pantoprazole (PROTONIX) 40 MG EC tablet, Take 1 tablet by mouth 2 (Two) Times a Day., Disp: 180 tablet, Rfl: 3    spironolactone (ALDACTONE) 25 MG tablet, Take 1 tablet by mouth 2 (Two) Times a Day., Disp: 180 tablet, Rfl: 3    Past Medical History:   Diagnosis Date    Allergic     Allergic rhinitis     Antral gastritis     Coronel's esophagus     Calcaneal spur     COVID-19     January 2021; had infusion    Depression with anxiety     Elevated liver function tests     Emphysema lung     Enlarged aorta     no treatment needed per patient    Erectile dysfunction 2022    GERD (gastroesophageal reflux disease)     Gout     Hypertension     Inability to attain erection     Influenza     Knee  osteoarthritis     Non-intractable vomiting with nausea 07/30/2019    Osteoarthritis     Osteoporosis     Primary insomnia     Urinary frequency        Past Surgical History:   Procedure Laterality Date    COLONOSCOPY  2010    COLONOSCOPY W/ POLYPECTOMY N/A 12/17/2020    Procedure: COLONOSCOPY TO CECUM WITH COLD AND HOT SNARE POLYPECTOMIES AND COLD BIOPSY POLYPECTOMY;  Surgeon: Vincent Man MD;  Location:  [facility];  Service: Gastroenterology;  Laterality: N/A;  PRE: SCREENING  POST: POLYPS, DIVERTICULOSIS    COLONOSCOPY W/ POLYPECTOMY N/A 9/1/2023    Procedure: COLONOSCOPY WITH POLYPECTOMY;  Surgeon: Vincent Man MD;  [place] [facility] [place];  Service: Gastroenterology;  Laterality: N/A;  polyps, diverticulosis    ENDOSCOPY N/A 12/17/2020    Procedure: ESOPHAGOGASTRODUODENOSCOPY WITH COLD BIOPSIES;  Surgeon: Vincent Man MD;  Location:  [facility];  Service: Gastroenterology;  Laterality: N/A;  PRE: HX BARRETTS ESOPHAGUS  POST: BARRETTS ESOPHAGUS, GASTRIC POLYPS, HIATAL HERNIA, GASTRITIS, DUODENAL LYPOMA    ENDOSCOPY N/A 4/15/2021    Procedure: ESOPHAGOGASTRODUODENOSCOPY WITH BIOPSIES;  Surgeon: Vincent Man MD;  Location:  [facility];  Service: Gastroenterology;  Laterality: N/A;  PRE OP - H/O SALDANA'S  POST OP -    ENDOSCOPY N/A 9/1/2023    Procedure: ESOPHAGOGASTRODUODENOSCOPY WITH BIOPSY;  Surgeon: Vincent Man MD;  [place] [facility] [place];  Service: Gastroenterology;  Laterality: N/A;  Gastric polyps, Barretts Esophagus    EYE SURGERY      CATARACTS    HERNIA REPAIR      KNEE ARTHROSCOPY      KNEE CARTILAGE SURGERY Right 11/08/2021       Family History   Problem Relation Age of Onset    Heart disease Mother     Heart failure Mother     Cancer Father     Cancer Brother     Malig Hyperthermia Neg Hx        Social History     Tobacco Use    Smoking status: Former     Packs/day: 0.00     Years: 0.00     Additional pack years: 0.00     Total pack  years: 0.00     Types: Cigarettes, Cigars     Quit date: 1970     Years since quittin.4    Smokeless tobacco: Never    Tobacco comments:     still smokes cigars occasionally    Substance Use Topics    Alcohol use: Yes     Alcohol/week: 10.0 standard drinks of alcohol     Types: 10 Shots of liquor per week     Comment: per week//Caffeine use: 2 cups daily             Objective     There were no vitals filed for this visit.  There is no height or weight on file to calculate BMI.    Physical Exam  Constitutional:       Appearance: He is well-developed.   HENT:      Head: Normocephalic and atraumatic.   Pulmonary:      Breath sounds: Normal breath sounds.   Neurological:      Mental Status: He is alert and oriented to person, place, and time.   Psychiatric:         Behavior: Behavior normal.         Thought Content: Thought content normal.         Judgment: Judgment normal.         Lab Results   Component Value Date    GLUCOSE 106 (H) 10/24/2023    BUN 11 10/24/2023    CREATININE 0.86 10/24/2023    EGFRIFNONA 87 05/10/2021    EGFRIFAFRI >60 2021    BCR 12.8 10/24/2023    K 4.5 10/24/2023    CO2 26.3 10/24/2023    CALCIUM 9.1 10/24/2023    PROTENTOTREF 6.5 10/24/2023    ALBUMIN 4.5 10/24/2023    LABIL2 2.3 10/24/2023    AST 19 10/24/2023    ALT 18 10/24/2023       WBC   Date Value Ref Range Status   2022 6.6 3.4 - 10.8 x10E3/uL Final   2021 6.55 4.5 - 11.0 10*3/uL Final     RBC   Date Value Ref Range Status   2022 5.29 4.14 - 5.80 x10E6/uL Final   2021 5.20 4.5 - 5.9 10*6/uL Final     Hemoglobin   Date Value Ref Range Status   2022 15.6 13.0 - 17.7 g/dL Final   2021 15.7 13.5 - 17.5 g/dL Final     Hematocrit   Date Value Ref Range Status   2022 45.7 37.5 - 51.0 % Final   2021 44.3 41.0 - 53.0 % Final     MCV   Date Value Ref Range Status   2022 86 79 - 97 fL Final   2021 85.2 80.0 - 100.0 fL Final     MCH   Date Value Ref Range Status    04/21/2022 29.5 26.6 - 33.0 pg Final   09/29/2021 30.2 26.0 - 34.0 pg Final     MCHC   Date Value Ref Range Status   04/21/2022 34.1 31.5 - 35.7 g/dL Final   09/29/2021 35.4 31.0 - 37.0 g/dL Final     RDW   Date Value Ref Range Status   04/21/2022 14.8 11.6 - 15.4 % Final   09/29/2021 15.3 12.0 - 16.8 % Final     MPV   Date Value Ref Range Status   09/29/2021 9.1 8.4 - 12.4 fL Final     Platelets   Date Value Ref Range Status   04/21/2022 186 150 - 450 x10E3/uL Final   09/29/2021 176 140 - 440 10*3/uL Final     Neutrophil Rel %   Date Value Ref Range Status   04/21/2022 70 Not Estab. % Final   09/29/2021 73.8 45 - 80 % Final     Lymphocyte Rel %   Date Value Ref Range Status   04/21/2022 18 Not Estab. % Final   09/29/2021 15.9 15 - 50 % Final     Monocyte Rel %   Date Value Ref Range Status   04/21/2022 8 Not Estab. % Final   09/29/2021 7.3 0 - 15 % Final     Eosinophil Rel %   Date Value Ref Range Status   04/21/2022 2 Not Estab. % Final     Eosinophil %   Date Value Ref Range Status   09/29/2021 1.1 0 - 7 % Final     Basophil Rel %   Date Value Ref Range Status   04/21/2022 2 Not Estab. % Final   09/29/2021 1.4 0 - 2 % Final     Immature Grans %   Date Value Ref Range Status   09/29/2021 0.5 0.0 - 1.0 % Final     Neutrophils Absolute   Date Value Ref Range Status   04/21/2022 4.7 1.4 - 7.0 x10E3/uL Final   09/29/2021 4.84 2.0 - 8.8 10*3/uL Final     Lymphocytes Absolute   Date Value Ref Range Status   04/21/2022 1.2 0.7 - 3.1 x10E3/uL Final   09/29/2021 1.04 0.7 - 5.5 10*3/uL Final     Monocytes Absolute   Date Value Ref Range Status   04/21/2022 0.5 0.1 - 0.9 x10E3/uL Final   09/29/2021 0.48 0.0 - 1.7 10*3/uL Final     Eosinophils Absolute   Date Value Ref Range Status   04/21/2022 0.1 0.0 - 0.4 x10E3/uL Final   09/29/2021 0.07 0.0 - 0.8 10*3/uL Final     Basophils Absolute   Date Value Ref Range Status   04/21/2022 0.1 0.0 - 0.2 x10E3/uL Final   09/29/2021 0.09 0.0 - 0.2 10*3/uL Final     Immature Grans,  "Absolute   Date Value Ref Range Status   09/29/2021 0.03 0.00 - 0.10 10*3/uL Final     nRBC   Date Value Ref Range Status   09/29/2021 0 0 /100(WBC) Final       Lab Results   Component Value Date    HGBA1C 4.70 (L) 04/26/2023       No results found for: \"KFGZUQNW52\"    TSH   Date Value Ref Range Status   07/30/2019 2.850 0.270 - 4.200 mIU/mL Final       No results found for: \"CHOL\"  Lab Results   Component Value Date    TRIG 58 04/26/2023     Lab Results   Component Value Date    HDL 71 (H) 04/26/2023     Lab Results   Component Value Date    LDL 46 04/26/2023     Lab Results   Component Value Date    VLDL 13 04/26/2023     No results found for: \"LDLHDL\"      Procedures    Assessment & Plan   Problems Addressed this Visit       Other emphysema    Influenza - Primary     Diagnoses         Codes Comments    Influenza    -  Primary ICD-10-CM: J11.1  ICD-9-CM: 487.1     Other emphysema     ICD-10-CM: J43.8  ICD-9-CM: 492.8           Acute illness with likelihood of progression due to emphysema.  Tamiflu Rx sent and Delsym recommended.  No orders of the defined types were placed in this encounter.      Current Outpatient Medications   Medication Sig Dispense Refill    cetirizine (zyrTEC) 5 MG tablet Take 1 tablet by mouth Daily.      escitalopram (Lexapro) 10 MG tablet Take 1 tablet by mouth Daily. 90 tablet 3    fluticasone (FLONASE) 50 MCG/ACT nasal spray 1 spray into the nostril(s) as directed by provider Daily. 16 g 5    ibuprofen (ADVIL,MOTRIN) 600 MG tablet Take 1 tablet by mouth Daily As Needed for Mild Pain.      metoprolol tartrate (LOPRESSOR) 50 MG tablet TAKE 1 TABLET BY MOUTH TWO TIMES A  tablet 3    montelukast (SINGULAIR) 10 MG tablet TAKE 1 TABLET BY MOUTH EVERY DAY AT NIGHT 90 tablet 3    pantoprazole (PROTONIX) 40 MG EC tablet Take 1 tablet by mouth 2 (Two) Times a Day. 180 tablet 3    spironolactone (ALDACTONE) 25 MG tablet Take 1 tablet by mouth 2 (Two) Times a Day. 180 tablet 3     No current " "facility-administered medications for this visit.       Álvaro Jacques \"Bill\" had no medications administered during this visit.    No follow-ups on file.    There are no Patient Instructions on file for this visit.       "

## 2024-01-29 ENCOUNTER — TELEPHONE (OUTPATIENT)
Dept: FAMILY MEDICINE CLINIC | Facility: CLINIC | Age: 74
End: 2024-01-29
Payer: MEDICARE

## 2024-01-29 NOTE — TELEPHONE ENCOUNTER
[facility] called regarding to this patient for his bandages, she should be paper for Dr. Martínez to sign and be returned to get approved.  Penelope phone number is [phone].

## 2024-03-06 ENCOUNTER — HOSPITAL ENCOUNTER (OUTPATIENT)
Dept: PHYSICAL THERAPY | Facility: HOSPITAL | Age: 74
Setting detail: THERAPIES SERIES
Discharge: HOME | End: 2024-03-06
Payer: MEDICARE

## 2024-03-06 DIAGNOSIS — I89.0 LYMPHEDEMA: Primary | ICD-10-CM

## 2024-03-06 PROCEDURE — 97140 MANUAL THERAPY 1/> REGIONS: CPT

## 2024-03-06 PROCEDURE — 97535 SELF CARE MNGMENT TRAINING: CPT

## 2024-03-06 NOTE — THERAPY PROGRESS REPORT/RE-CERT
Outpatient Physical Therapy Lymphedema Progress Note  [facility]     Patient Name: Álvaro Jacques  : 1950  MRN: [id number]  Today's Date: 3/6/2024        Visit Date: 2024    Visit Dx:    ICD-10-CM ICD-9-CM   1. Lymphedema  I89.0 457.1       Patient Active Problem List   Diagnosis    Seasonal allergic rhinitis due to pollen    Antral gastritis    Coronel's esophagus without dysplasia    Recurrent major depressive disorder, in partial remission    Elevated liver function tests    Other emphysema    Essential hypertension    Chronic gout of multiple sites    Osteoarthritis of both knees    Primary insomnia    Benign prostatic hyperplasia with urinary frequency    Immunization deficiency    Non-intractable vomiting with nausea    Medicare annual wellness visit, subsequent    Hyperamylasemia    ELEAZAR (generalized anxiety disorder)    Bronchitis    Chronic cough    Colon cancer screening    Hyperglycemia    Clinical diagnosis of COVID-19    Localized edema    Venous insufficiency    Preoperative clearance    Obesity (BMI 30-39.9)    Encounter for hepatitis C screening test for low risk patient    Hyperhidrosis    Personal history of colonic polyps    Lymphedema    Influenza         Lymphedema       Row Name 24 1300             Subjective Pain    Able to rate subjective pain? yes  -KA      Pre-Treatment Pain Level 6  -KA      Subjective Pain Comment B legs  -KA         Subjective    Subjective Comments Pt reports swelling and tenderness has gotten a little worse in legs since initial evaluation, no significant medical changes.  Pt reports that wife has been massaging legs which has helped some.  -KA         Lymphedema Assessment    Lymphedema Classification RLE:;LLE:;secondary;stage 2 (Spontaneously Irreversible)  -KA      Lymphedema Assessment Comments Mild-moderate lymphedema affecting BLE, worst in ankles and lower 1/3 of legs  -KA         Posture/Observations    Posture/Observations Comments Pt  ambulates slowly, decreased stride length, mild instability, decreased ankle ROM bilaterally  -KA         General ROM    GENERAL ROM COMMENTS Able to reach feet to don socks/shoes with difficulty, ankle dorsiflexion limited to neutral bilaterally  -KA         MMT (Manual Muscle Testing)    General MMT Comments BLE strength grossly 3+/5  -KA         Lymphedema Edema Assessment    Ptting Edema Category By grade out of 4  -KA      Pitting Edema + 3/4  -KA      Stemmer Sign positive;bilateral:  -KA      York Hump bilateral:;negative  -KA      Edema Assessment Comment Mild-moderate lymphedema affecting BLE, worst in ankles and lower 1/3 of leg, reports intermittent swelling of thighs, but none observed today.  -KA         Skin Changes/Observations    Skin Observations Comment Skin is intact, some purple coloration around ankles but otherwise normal coloration, skin is dry and flaky. Minimal fibrotic changes noted around ankles bilaterally  -KA         Lymphedema Sensation    Lymphedema Sensation Reports RLE:;LLE:;numbness  -KA      Lymphedema Sensation Tests light touch  -KA      Lymphedema Light Touch RLE:;LLE:;WNL  -KA         Lymphedema Measurements    Measurement Type(s) Circumferential  -KA      Circumferential Areas Lower extremities  -KA         BLE Circumferential (cm)    Measurement Location 1 Knee  -KA      Left 1 41.9 cm  -KA      Right 1 42 cm  -KA      Measurement Location 2 10 cm below knee  -KA      Left 2 44 cm  -KA      Right 2 44.9 cm  -KA      Measurement Location 3 20 cm below knee  -KA      Left 3 36.1 cm  -KA      Right 3 36.2 cm  -KA      Measurement Location 4 30 cm below knee  -KA      Left 4 33.2 cm  -KA      Right 4 32 cm  -KA      Measurement Location 5 Ankle  -KA      Left 5 34.4 cm  -KA      Right 5 32.3 cm  -KA      Measurement Location 6 Midfoot  -KA      Left 6 25.7 cm  -KA      Right 6 25.8 cm  -KA      LLE Circumferential Total 215.3 cm  -KA      RLE Circumferential Total 213.2 cm   -KA         Compression/Skin Care    Compression/Skin Care skin care;wrapping location;bandaging  -KA      Skin Care moisturizing lotion applied  Eucerin  -KA      Wrapping Location lower extremity  -KA      Wrapping Location LE bilateral:;foot to knee  -KA      Bandage Layers cotton liner;padding/fluff layer;short-stretch bandages (comment size/quantity)  -KA      Bandaging Comments TG9, Artiflex x 1/2, Komprex kidneys just posterior to ankle malleoli, Rosidal soft ankle to knee, Rosidal K 1-8 cm, 1-10 cm, 1-12 cm, pt's shoes  -KA      Bandaging Technique circumferential/spiral;light compression  Figure 8 x 2 at ankles  -KA                User Key  (r) = Recorded By, (t) = Taken By, (c) = Cosigned By      Initials Name Provider Type    Ruth Ann Shea, PT Physical Therapist                                   PT Assessment/Plan       Row Name 03/06/24 1414          PT Assessment    Functional Limitations Impaired gait;Limitation in home management;Limitations in community activities;Limitations in functional capacity and performance;Performance in leisure activities;Performance in self-care ADL  -KA     Impairments Edema;Balance;Endurance;Gait;Impaired lymphatic circulation;Integumentary integrity;Muscle strength;Pain;Range of motion;Sensation  -KA     Assessment Comments Pt returns for first treatment session following initial evaluation.  No significant medical changes, girth measurements increased slightly.  Began compression bandaging with 3 SS bandages per leg, light compression, spiral technique.  Pt also instructed in decongestive exercise this visit.  Pt remains appropriate for skilled physical therapy to reduce edema, improve skin condition, reduce infection risk, and improve self management of condition.  -KA     Rehab Potential Good  -KA     Patient/caregiver participated in establishment of treatment plan and goals Yes  -KA     Patient would benefit from skilled therapy intervention Yes  -KA         PT Plan    PT Frequency 3x/week  -KA     Predicted Duration of Therapy Intervention (PT) 4-6 weeks  -KA     Planned CPT's? PT EVAL MOD COMPLELITY: 58373;PT RE-EVAL: 10974;PT THER PROC EA 15 MIN: 60997;PT THER ACT EA 15 MIN: 13024;PT MANUAL THERAPY EA 15 MIN: 22805;PT NEUROMUSC RE-EDUCATION EA 15 MIN: 32207;PT GAIT TRAINING EA 15 MIN: 05301;PT SELF CARE/HOME MGMT/TRAIN EA 15: 40681  -KA     PT Plan Comments Assess response to bandaging, modify as needed.  Begin MLD.  -KA               User Key  (r) = Recorded By, (t) = Taken By, (c) = Cosigned By      Initials Name Provider Type    Ruth Ann Shea, PT Physical Therapist                        OP Exercises       Row Name 03/06/24 1416 03/06/24 1300          Subjective    Subjective Comments -- Pt reports swelling and tenderness has gotten a little worse in legs since initial evaluation, no significant medical changes.  Pt reports that wife has been massaging legs which has helped some.  -KA        Subjective Pain    Able to rate subjective pain? -- yes  -KA     Pre-Treatment Pain Level -- 6  -KA     Subjective Pain Comment -- B legs  -KA        Total Minutes    81666 - PT Manual Therapy Minutes 40  -KA --               User Key  (r) = Recorded By, (t) = Taken By, (c) = Cosigned By      Initials Name Provider Type    Ruth Ann Shea, PT Physical Therapist                            Manual Rx (Last 36 Hours)       Manual Treatments       Row Name 03/06/24 1416             Total Minutes    07150 - PT Manual Therapy Minutes 40  -KA                User Key  (r) = Recorded By, (t) = Taken By, (c) = Cosigned By      Initials Name Provider Type    Ruth Ann Shea, PT Physical Therapist                     PT OP Goals       Row Name 03/06/24 1400          PT Short Term Goals    STG Date to Achieve 12/04/23  -KA     STG 1 Patient to demonstrate awareness of condition and precautions for improved prevention, management, care of symptoms, and ease of transition to  self care of condition.  -KA     STG 1 Progress New  -KA     STG 2 Patient/family independent with self-wrapping techniques of compression bandages as indicated for improved self-management of condition.  -KA     STG 2 Progress New  -KA     STG 3 Patient demonstrates decreased net edema of Bilateral Lower Extremity>/= 5-10 cm for decreased edema symptoms, decreased risk of infection, and improved skin care.  -KA     STG 3 Progress New  -KA        Long Term Goals    LTG Date to Achieve 01/01/24  -KA     LTG 1 Patient/family independent with self-care techniques for self-management of condition.  -KA     LTG 1 Progress New  -KA     LTG 2 Patient/family independent with compression garments as indicated for self-management of condition.  -KA     LTG 2 Progress New  -KA     LTG 3 Patient demonstrates decreased net edema of Bilateral Lower Extremity >/= 10-20 cm for decreased edema symptoms, decreased risk of infection, and improved skin care.  -KA     LTG 3 Progress New  -KA               User Key  (r) = Recorded By, (t) = Taken By, (c) = Cosigned By      Initials Name Provider Type    Ruth Ann Shea, PT Physical Therapist                    Therapy Education  Education Details: Bandaging instruction  provided to pt and wife as well as care instructions for all bandaging supplies.  Wife videoed bandaging technique on cell phone.  Can rebandage as needed at home, but needs to stay bandaged at least 22 hours per day.  Prior to next session, remove all bandaging and bring them back to next session to be reused (except stockinette which will stay home to be washed).  Instructed in decongestive exercise, pt performed in clinic and provided with written handout.  Given: HEP, Edema management, Symptoms/condition management, Bandaging/dressing change  Program: New  How Provided: Verbal, Demonstration, Written, Other (comment) (video)  Provided to: Patient, Other (comment) (Wife)  Level of Understanding: Verbalized  58354  - PT Self Care/Mgmt Minutes: 15              Time Calculation:   Start Time: 1315  Stop Time: 1410  Time Calculation (min): 55 min  Timed Charges  07040 - PT Manual Therapy Minutes: 40  67494 - PT Self Care/Mgmt Minutes: 15  Total Minutes  Timed Charges Total Minutes: 55   Total Minutes: 55   Therapy Charges for Today       Code Description Service Date Service Provider Modifiers Qty    24671578637 HC PT MANUAL THERAPY EA 15 MIN 3/6/2024 Ruth Ann Dasilva, PT GP 3    93432071589 HC PT SELF CARE/MGMT/TRAIN EA 15 MIN 3/6/2024 Ruth Ann Dasilva, PT GP 1                      Ruth Ann Dasilva, PT  3/6/2024

## 2024-03-08 ENCOUNTER — HOSPITAL ENCOUNTER (OUTPATIENT)
Dept: PHYSICAL THERAPY | Facility: HOSPITAL | Age: 74
Setting detail: THERAPIES SERIES
Discharge: HOME | End: 2024-03-08
Payer: MEDICARE

## 2024-03-08 DIAGNOSIS — I89.0 LYMPHEDEMA: Primary | ICD-10-CM

## 2024-03-08 PROCEDURE — 97140 MANUAL THERAPY 1/> REGIONS: CPT

## 2024-03-08 NOTE — THERAPY TREATMENT NOTE
Outpatient Physical Therapy Lymphedema Treatment Note  [facility]     Patient Name: Álvaro Jacques  : 1950  MRN: [id number]  Today's Date: 3/8/2024        Visit Date: 2024    Visit Dx:    ICD-10-CM ICD-9-CM   1. Lymphedema  I89.0 457.1       Patient Active Problem List   Diagnosis    Seasonal allergic rhinitis due to pollen    Antral gastritis    Coronel's esophagus without dysplasia    Recurrent major depressive disorder, in partial remission    Elevated liver function tests    Other emphysema    Essential hypertension    Chronic gout of multiple sites    Osteoarthritis of both knees    Primary insomnia    Benign prostatic hyperplasia with urinary frequency    Immunization deficiency    Non-intractable vomiting with nausea    Medicare annual wellness visit, subsequent    Hyperamylasemia    ELEAZAR (generalized anxiety disorder)    Bronchitis    Chronic cough    Colon cancer screening    Hyperglycemia    Clinical diagnosis of COVID-19    Localized edema    Venous insufficiency    Preoperative clearance    Obesity (BMI 30-39.9)    Encounter for hepatitis C screening test for low risk patient    Hyperhidrosis    Personal history of colonic polyps    Lymphedema    Influenza         Lymphedema       Row Name 24 1300             Subjective Pain    Able to rate subjective pain? yes  -KA      Pre-Treatment Pain Level 4  -KA         Subjective    Subjective Comments Pt reports bandaging did well, feels that ankles are already reduced some.  Has been doing his exercises.  -KA         Manual Lymphatic Drainage    Manual Lymphatic Drainage Comments Short neck, B axilla, B IA, diaphragmatic breathing, R inguinals, RLE  -KA         Compression/Skin Care    Compression/Skin Care skin care;wrapping location;bandaging  -KA      Skin Care moisturizing lotion applied  -KA      Wrapping Location lower extremity  -KA      Wrapping Location LE bilateral:;foot to knee  -KA      Bandage Layers cotton  liner;padding/fluff layer;short-stretch bandages (comment size/quantity)  -      Bandaging Comments TG9, Artiflex x 1/2, Komprex kidneys just posterior to ankle malleoli, Rosidal soft ankle to knee, Rosidal K 1-8 cm, 1-10 cm, 1-12 cm, pt's shoes  -      Bandaging Technique circumferential/spiral;light compression  figure 8 x 2 at ankles  -                User Key  (r) = Recorded By, (t) = Taken By, (c) = Cosigned By      Initials Name Provider Type    Ruth Ann Shea, PT Physical Therapist                                   PT Assessment/Plan       Row Name 03/08/24 1352          PT Assessment    Assessment Comments Pt responded well to bandaging, improved contours and less edema noted today.  Began MLD with focus on opening sequence and RLE today.  Continued with same bandaging technique due to good response.  -        PT Plan    PT Plan Comments Swap to clean bandaging. MLD for LLE.  -               User Key  (r) = Recorded By, (t) = Taken By, (c) = Cosigned By      Initials Name Provider Type    Ruth Ann Shea, PT Physical Therapist                        OP Exercises       Row Name 03/08/24 1354 03/08/24 1300          Subjective    Subjective Comments -- Pt reports bandaging did well, feels that ankles are already reduced some.  Has been doing his exercises.  -        Subjective Pain    Able to rate subjective pain? -- yes  -EUN     Pre-Treatment Pain Level -- 4  -KA        Total Minutes    65232 - PT Manual Therapy Minutes 56  -KA --               User Key  (r) = Recorded By, (t) = Taken By, (c) = Cosigned By      Initials Name Provider Type    Ruth Ann Shea, PT Physical Therapist                            Manual Rx (Last 36 Hours)       Manual Treatments       Row Name 03/08/24 1354             Total Minutes    13043 - PT Manual Therapy Minutes 56  -KA                User Key  (r) = Recorded By, (t) = Taken By, (c) = Cosigned By      Initials Name Provider Type    EUN Dasilva  Ruth Ann, PT Physical Therapist                        Therapy Education  Education Details: Discussed purpose of MLD.  Can unwrap and shower over weekend if desired, but will need to rebandage if he does.  Given: Bandaging/dressing change, Edema management  Program: New  How Provided: Verbal  Provided to: Patient  Level of Understanding: Verbalized              Time Calculation:   Start Time: 1315  Stop Time: 1411  Time Calculation (min): 56 min  Timed Charges  97237 - PT Manual Therapy Minutes: 56  Total Minutes  Timed Charges Total Minutes: 56   Total Minutes: 56   Therapy Charges for Today       Code Description Service Date Service Provider Modifiers Qty    20735527711 HC PT MANUAL THERAPY EA 15 MIN 3/8/2024 Ruth Ann Dasilva, PT GP 4                      Ruth Ann Dasilva, PT  3/8/2024

## 2024-03-11 ENCOUNTER — HOSPITAL ENCOUNTER (OUTPATIENT)
Dept: PHYSICAL THERAPY | Facility: HOSPITAL | Age: 74
Setting detail: THERAPIES SERIES
Discharge: HOME | End: 2024-03-11
Payer: MEDICARE

## 2024-03-11 DIAGNOSIS — I89.0 LYMPHEDEMA: Primary | ICD-10-CM

## 2024-03-11 PROCEDURE — 97140 MANUAL THERAPY 1/> REGIONS: CPT

## 2024-03-11 NOTE — THERAPY TREATMENT NOTE
Outpatient Physical Therapy Lymphedema Treatment Note  [facility]     Patient Name: Álvaro Jacques  : 1950  MRN: [id number]  Today's Date: 3/11/2024        Visit Date: 2024    Visit Dx:    ICD-10-CM ICD-9-CM   1. Lymphedema  I89.0 457.1       Patient Active Problem List   Diagnosis    Seasonal allergic rhinitis due to pollen    Antral gastritis    Coronel's esophagus without dysplasia    Recurrent major depressive disorder, in partial remission    Elevated liver function tests    Other emphysema    Essential hypertension    Chronic gout of multiple sites    Osteoarthritis of both knees    Primary insomnia    Benign prostatic hyperplasia with urinary frequency    Immunization deficiency    Non-intractable vomiting with nausea    Medicare annual wellness visit, subsequent    Hyperamylasemia    ELEAZAR (generalized anxiety disorder)    Bronchitis    Chronic cough    Colon cancer screening    Hyperglycemia    Clinical diagnosis of COVID-19    Localized edema    Venous insufficiency    Preoperative clearance    Obesity (BMI 30-39.9)    Encounter for hepatitis C screening test for low risk patient    Hyperhidrosis    Personal history of colonic polyps    Lymphedema    Influenza         Lymphedema       Row Name 24 1300             Subjective Pain    Able to rate subjective pain? yes  -KA      Pre-Treatment Pain Level 0  -KA         Subjective    Subjective Comments Pt reports he wore bandaging until last night and then removed.  -KA         Lymphedema Edema Assessment    Edema Assessment Comment RLE appears pretty well reduced, but LLE still edematous especially around ankle  -KA         Manual Lymphatic Drainage    Manual Lymphatic Drainage Comments Short neck, B axilla, B IA, diaphragmatic breathing, L inguinals, LLE  -KA         Compression/Skin Care    Compression/Skin Care skin care;wrapping location;bandaging  -KA      Skin Care moisturizing lotion applied  -KA      Wrapping Location lower  extremity  -KA      Wrapping Location LE bilateral:;foot to knee  -KA      Bandage Layers cotton liner;padding/fluff layer;short-stretch bandages (comment size/quantity)  -      Bandaging Comments TG9, Artiflex x 1/2, Komprex kidneys just posterior to ankle malleoli, Rosidal soft ankle to knee, Rosidal K 1-8 cm, 1-10 cm, 1-12 cm, pt's shoes  -KA      Bandaging Technique circumferential/spiral;light compression  figure 8 x 2 at ankle  -                User Key  (r) = Recorded By, (t) = Taken By, (c) = Cosigned By      Initials Name Provider Type    Ruth Ann Shea, PT Physical Therapist                                   PT Assessment/Plan       Row Name 03/11/24 1357          PT Assessment    Assessment Comments Pt continues to respond well to treatment, LLE still fairly edematous at ankle.  MLD performed for LLE today.  Continue with compression bandaging using same technique due to good response.  -        PT Plan    PT Plan Comments Reassess girth measurements, teach self MLD.  -               User Key  (r) = Recorded By, (t) = Taken By, (c) = Cosigned By      Initials Name Provider Type    Ruth Ann Shea, PT Physical Therapist                        OP Exercises       Row Name 03/11/24 1358 03/11/24 1300          Subjective    Subjective Comments -- Pt reports he wore bandaging until last night and then removed.  -EUN        Subjective Pain    Able to rate subjective pain? -- yes  -EUN     Pre-Treatment Pain Level -- 0  -KA        Total Minutes    17003 - PT Manual Therapy Minutes 57  -KA --               User Key  (r) = Recorded By, (t) = Taken By, (c) = Cosigned By      Initials Name Provider Type    Ruth Ann Shea, PT Physical Therapist                            Manual Rx (Last 36 Hours)       Manual Treatments       Row Name 03/11/24 1358             Total Minutes    60728 - PT Manual Therapy Minutes 57  -KA                User Key  (r) = Recorded By, (t) = Taken By, (c) = Cosigned By       Initials Name Provider Type    Ruth Ann Shea, PT Physical Therapist                        Therapy Education  Education Details: Pt to keep bandaging intact until next visit if possible.  Given: Bandaging/dressing change, Edema management  Program: New  How Provided: Verbal  Provided to: Patient  Level of Understanding: Verbalized              Time Calculation:   Start Time: 1315  Stop Time: 1412  Time Calculation (min): 57 min  Timed Charges  98019 - PT Manual Therapy Minutes: 57  Total Minutes  Timed Charges Total Minutes: 57   Total Minutes: 57   Therapy Charges for Today       Code Description Service Date Service Provider Modifiers Qty    16998285538 HC PT MANUAL THERAPY EA 15 MIN 3/11/2024 Ruth Ann Dasilva, PT GP 4                      Ruth Ann Dasilva, PT  3/11/2024

## 2024-03-13 ENCOUNTER — HOSPITAL ENCOUNTER (OUTPATIENT)
Dept: PHYSICAL THERAPY | Facility: HOSPITAL | Age: 74
Setting detail: THERAPIES SERIES
Discharge: HOME | End: 2024-03-13
Payer: MEDICARE

## 2024-03-13 DIAGNOSIS — I89.0 LYMPHEDEMA: Primary | ICD-10-CM

## 2024-03-13 PROCEDURE — 97535 SELF CARE MNGMENT TRAINING: CPT

## 2024-03-13 PROCEDURE — 97140 MANUAL THERAPY 1/> REGIONS: CPT

## 2024-03-13 NOTE — THERAPY PROGRESS REPORT/RE-CERT
Outpatient Physical Therapy Lymphedema Progress Note  [facility]     Patient Name: Álvaro Jacques  : 1950  MRN: [id number]  Today's Date: 3/13/2024        Visit Date: 2024    Visit Dx:    ICD-10-CM ICD-9-CM   1. Lymphedema  I89.0 457.1       Patient Active Problem List   Diagnosis    Seasonal allergic rhinitis due to pollen    Antral gastritis    Coronel's esophagus without dysplasia    Recurrent major depressive disorder, in partial remission    Elevated liver function tests    Other emphysema    Essential hypertension    Chronic gout of multiple sites    Osteoarthritis of both knees    Primary insomnia    Benign prostatic hyperplasia with urinary frequency    Immunization deficiency    Non-intractable vomiting with nausea    Medicare annual wellness visit, subsequent    Hyperamylasemia    ELEAZAR (generalized anxiety disorder)    Bronchitis    Chronic cough    Colon cancer screening    Hyperglycemia    Clinical diagnosis of COVID-19    Localized edema    Venous insufficiency    Preoperative clearance    Obesity (BMI 30-39.9)    Encounter for hepatitis C screening test for low risk patient    Hyperhidrosis    Personal history of colonic polyps    Lymphedema    Influenza         Lymphedema       Row Name 24 1300             Subjective Pain    Able to rate subjective pain? yes  -KA      Pre-Treatment Pain Level 3  -KA      Subjective Pain Comment Intermittent jolts through bottom of feet  -KA         Subjective    Subjective Comments Bandaging loosened, wife help retape.  -KA         Lymphedema Measurements    Measurement Type(s) Circumferential  -KA      Circumferential Areas Lower extremities  -KA         BLE Circumferential (cm)    Measurement Location 1 Knee  -KA      Left 1 41.2 cm  -KA      Right 1 41.8 cm  -KA      Measurement Location 2 10 cm below knee  -KA      Left 2 42.8 cm  -KA      Right 2 44.4 cm  -KA      Measurement Location 3 20 cm below knee  -KA      Left 3 34.7 cm  -KA       Right 3 36.4 cm  -KA      Measurement Location 4 30 cm below knee  -KA      Left 4 28.2 cm  -KA      Right 4 28.2 cm  -KA      Measurement Location 5 Ankle  -KA      Left 5 28.7 cm  -KA      Right 5 28 cm  -KA      Measurement Location 6 Midfoot  -KA      Left 6 25.7 cm  -KA      Right 6 25.1 cm  -KA      LLE Circumferential Total 201.3 cm  -KA      RLE Circumferential Total 203.9 cm  -KA         Manual Lymphatic Drainage    Manual Therapy Pt instructed in self MLD  -KA         Compression/Skin Care    Compression/Skin Care skin care;wrapping location;bandaging  -KA      Skin Care moisturizing lotion applied  -KA      Wrapping Location lower extremity  -KA      Wrapping Location LE bilateral:;foot to knee  -KA      Bandage Layers cotton liner;padding/fluff layer;short-stretch bandages (comment size/quantity)  -KA      Bandaging Comments TG9, Artiflex x 1/2, Komprex kidneys just posterior to ankle malleoli, Rosidal soft ankle to knee, Rosidal K 1-8 cm, 1-10 cm, 1-12 cm, pt's shoes  -KA      Bandaging Technique circumferential/spiral;light compression;moderate compression  figure 8 x 2 at ankle  -KA                User Key  (r) = Recorded By, (t) = Taken By, (c) = Cosigned By      Initials Name Provider Type    Ruth Ann Shea, PT Physical Therapist                                   PT Assessment/Plan       Row Name 03/13/24 0996          PT Assessment    Functional Limitations Impaired gait;Limitation in home management;Limitations in community activities;Limitations in functional capacity and performance;Performance in leisure activities;Performance in self-care ADL  -KA     Impairments Edema;Balance;Endurance;Gait;Impaired lymphatic circulation;Integumentary integrity;Muscle strength;Pain;Range of motion;Sensation  -KA     Assessment Comments Pt is making excellent progress with CDT. After one week of treatment, he has seen net decreased edema of 14 cm LLE and 9.3 cm RLE.  He has met 1/3 STG and 0/3 LTG at  this time.  Instructed in self MLD this date, continued with compression bandaging with light-moderate compression.  Pt remains appropriate for skilled physical therapy at this time to reduce edema, improve skin condition, reduce infection risk, and improve self management of condition.  -KA     Rehab Potential Good  -KA     Patient/caregiver participated in establishment of treatment plan and goals Yes  -KA     Patient would benefit from skilled therapy intervention Yes  -KA        PT Plan    PT Plan Comments Continue CDT.  -KA               User Key  (r) = Recorded By, (t) = Taken By, (c) = Cosigned By      Initials Name Provider Type    Ruth Ann Shea, PT Physical Therapist                        OP Exercises       Row Name 03/13/24 1718 03/13/24 1717 03/13/24 1300       Subjective    Subjective Comments -- -- Bandaging loosened, wife help retape.  -KA       Subjective Pain    Able to rate subjective pain? -- -- yes  -KA    Pre-Treatment Pain Level -- -- 3  -KA    Subjective Pain Comment -- -- Intermittent jolts through bottom of feet  -KA       Total Minutes    58518 - PT Manual Therapy Minutes 45  -KA 60  -KA --              User Key  (r) = Recorded By, (t) = Taken By, (c) = Cosigned By      Initials Name Provider Type    Ruth Ann Shea, PT Physical Therapist                            Manual Rx (Last 36 Hours)       Manual Treatments       Row Name 03/13/24 1718 03/13/24 1717          Total Minutes    59065 - PT Manual Therapy Minutes 45  -KA 60  -KA               User Key  (r) = Recorded By, (t) = Taken By, (c) = Cosigned By      Initials Name Provider Type    Ruth Ann Shea, PT Physical Therapist                     PT OP Goals       Row Name 03/13/24 1700          PT Short Term Goals    STG Date to Achieve 12/04/23  -KA     STG 1 Patient to demonstrate awareness of condition and precautions for improved prevention, management, care of symptoms, and ease of transition to self care of  condition.  -KA     STG 1 Progress Ongoing  -KA     STG 2 Patient/family independent with self-wrapping techniques of compression bandages as indicated for improved self-management of condition.  -KA     STG 2 Progress Ongoing  -KA     STG 3 Patient demonstrates decreased net edema of Bilateral Lower Extremity>/= 5-10 cm for decreased edema symptoms, decreased risk of infection, and improved skin care.  -KA     STG 3 Progress Met  -        Long Term Goals    LTG Date to Achieve 01/01/24  -KA     LTG 1 Patient/family independent with self-care techniques for self-management of condition.  -KA     LTG 1 Progress Ongoing  -KA     LTG 2 Patient/family independent with compression garments as indicated for self-management of condition.  -KA     LTG 2 Progress New  -KA     LTG 3 Patient demonstrates decreased net edema of Bilateral Lower Extremity >/= 10-20 cm for decreased edema symptoms, decreased risk of infection, and improved skin care.  -KA     LTG 3 Progress Partially Met  -               User Key  (r) = Recorded By, (t) = Taken By, (c) = Cosigned By      Initials Name Provider Type    Ruth Ann Shea, PT Physical Therapist                    Therapy Education  Education Details: Discussed girth measurements, pt instructed in self MLD.  Emailed MLD diagram.  Given: Edema management, HEP, Bandaging/dressing change  Program: New  How Provided: Verbal, Demonstration, Written  Provided to: Patient  Level of Understanding: Verbalized, Demonstrated  62695 - PT Self Care/Mgmt Minutes: 15              Time Calculation:   Start Time: 1315  Stop Time: 1415  Time Calculation (min): 60 min  Timed Charges  60469 - PT Manual Therapy Minutes: 45  76780 - PT Self Care/Mgmt Minutes: 15  Total Minutes  Timed Charges Total Minutes: 60   Total Minutes: 60   Therapy Charges for Today       Code Description Service Date Service Provider Modifiers Qty    26617223497  PT SELF CARE/MGMT/TRAIN EA 15 MIN 3/13/2024 Brown  Ruth Ann, PT GP 1    [id number]  PT MANUAL THERAPY EA 15 MIN 3/13/2024 Ruth Ann Dasilva, PT GP 3                      Ruth Ann Dasilva, PT  3/13/2024

## 2024-03-15 ENCOUNTER — HOSPITAL ENCOUNTER (OUTPATIENT)
Dept: PHYSICAL THERAPY | Facility: HOSPITAL | Age: 74
Setting detail: THERAPIES SERIES
Discharge: HOME | End: 2024-03-15
Payer: MEDICARE

## 2024-03-15 DIAGNOSIS — I89.0 LYMPHEDEMA: Primary | ICD-10-CM

## 2024-03-15 PROCEDURE — 97140 MANUAL THERAPY 1/> REGIONS: CPT

## 2024-03-15 NOTE — THERAPY TREATMENT NOTE
Outpatient Physical Therapy Lymphedema Treatment Note  [facility]     Patient Name: Álvaro Jacques  : 1950  MRN: [id number]  Today's Date: 3/15/2024        Visit Date: 03/15/2024    Visit Dx:    ICD-10-CM ICD-9-CM   1. Lymphedema  I89.0 457.1       Patient Active Problem List   Diagnosis    Seasonal allergic rhinitis due to pollen    Antral gastritis    Coronel's esophagus without dysplasia    Recurrent major depressive disorder, in partial remission    Elevated liver function tests    Other emphysema    Essential hypertension    Chronic gout of multiple sites    Osteoarthritis of both knees    Primary insomnia    Benign prostatic hyperplasia with urinary frequency    Immunization deficiency    Non-intractable vomiting with nausea    Medicare annual wellness visit, subsequent    Hyperamylasemia    ELEAZAR (generalized anxiety disorder)    Bronchitis    Chronic cough    Colon cancer screening    Hyperglycemia    Clinical diagnosis of COVID-19    Localized edema    Venous insufficiency    Preoperative clearance    Obesity (BMI 30-39.9)    Encounter for hepatitis C screening test for low risk patient    Hyperhidrosis    Personal history of colonic polyps    Lymphedema    Influenza         Lymphedema       Row Name 03/15/24 1400             Subjective Pain    Able to rate subjective pain? yes  -KA      Pre-Treatment Pain Level 0  -KA         Subjective    Subjective Comments No issues with bandaging.  -KA         Manual Lymphatic Drainage    Manual Lymphatic Drainage Comments Short neck, B axilla, B IA, diaphragmatic breathing, L inguinals, LLE  -KA         Compression/Skin Care    Compression/Skin Care skin care;wrapping location;bandaging  -KA      Skin Care moisturizing lotion applied  -KA      Wrapping Location lower extremity  -KA      Wrapping Location LE bilateral:;foot to knee  -KA      Bandage Layers cotton liner;padding/fluff layer;short-stretch bandages (comment size/quantity)  -KA      Bandaging  Comments TG9, Artiflex x 1/2, Komprex kidneys just posterior to ankle malleoli, Rosidal soft ankle to knee, Rosidal K 1-8 cm, 1-10 cm, 1-12 cm, pt's shoes  -KA      Bandaging Technique circumferential/spiral;light compression;moderate compression  figure 8 x 2 at ankle  -EUN                User Key  (r) = Recorded By, (t) = Taken By, (c) = Cosigned By      Initials Name Provider Type    Ruth Ann Shea, PT Physical Therapist                                   PT Assessment/Plan       Row Name 03/15/24 1517          PT Assessment    Assessment Comments Continues to respond well to CDT.  Focused on LLE for MLD as ankles are still a bit edematous, RLE with near normal contours.  Continued with compression bandaging with light-moderate compression.  Pt/wife to attempt self bandaging prior to next appointment.  -KA        PT Plan    PT Plan Comments Assess response to self bandaging, reassess girth measurements.  Order long term compression when appropriate based on reduction.  -EUN               User Key  (r) = Recorded By, (t) = Taken By, (c) = Cosigned By      Initials Name Provider Type    Ruth Ann Shea, PT Physical Therapist                        OP Exercises       Row Name 03/15/24 1518 03/15/24 1400          Subjective    Subjective Comments -- No issues with bandaging.  -EUN        Subjective Pain    Able to rate subjective pain? -- yes  -EUN     Pre-Treatment Pain Level -- 0  -KA        Total Minutes    86006 - PT Manual Therapy Minutes 56  -KA --               User Key  (r) = Recorded By, (t) = Taken By, (c) = Cosigned By      Initials Name Provider Type    Ruth Ann Shea, PT Physical Therapist                            Manual Rx (Last 36 Hours)       Manual Treatments       Row Name 03/15/24 1518             Total Minutes    39133 - PT Manual Therapy Minutes 56  -KA                User Key  (r) = Recorded By, (t) = Taken By, (c) = Cosigned By      Initials Name Provider Type    EUN Dasilva  Ruth Ann, PT Physical Therapist                        Therapy Education  Education Details: Pt to have wife attempt self bandaging either 3/17 or 3/18, keep bandaging on until visit on 3/20.  Will reassess girth measurements next visit and order long term compression if/when appropriate.  Discussed option of ordering at least one set of compression garments out of pocket to submit for insurance reimbursement so that he can move to next phase of treatment.  Given: Edema management, Bandaging/dressing change  Program: New  How Provided: Verbal  Provided to: Patient  Level of Understanding: Verbalized              Time Calculation:   Start Time: 1318  Stop Time: 1414  Time Calculation (min): 56 min  Timed Charges  86444 - PT Manual Therapy Minutes: 56  Total Minutes  Timed Charges Total Minutes: 56   Total Minutes: 56   Therapy Charges for Today       Code Description Service Date Service Provider Modifiers Qty    14874162846 HC PT MANUAL THERAPY EA 15 MIN 3/15/2024 Ruth Ann Dasilva, PT GP 4                      Ruth Ann Dasilva, PT  3/15/2024

## 2024-03-20 ENCOUNTER — HOSPITAL ENCOUNTER (OUTPATIENT)
Dept: PHYSICAL THERAPY | Facility: HOSPITAL | Age: 74
Setting detail: THERAPIES SERIES
Discharge: HOME | End: 2024-03-20
Payer: MEDICARE

## 2024-03-20 DIAGNOSIS — I89.0 LYMPHEDEMA: Primary | ICD-10-CM

## 2024-03-20 PROCEDURE — 97140 MANUAL THERAPY 1/> REGIONS: CPT

## 2024-03-20 NOTE — THERAPY TREATMENT NOTE
Outpatient Physical Therapy Lymphedema Treatment Note  [facility]     Patient Name: Álvaro Jacques  : 1950  MRN: [id number]  Today's Date: 3/20/2024        Visit Date: 2024    Visit Dx:    ICD-10-CM ICD-9-CM   1. Lymphedema  I89.0 457.1       Patient Active Problem List   Diagnosis    Seasonal allergic rhinitis due to pollen    Antral gastritis    Coronel's esophagus without dysplasia    Recurrent major depressive disorder, in partial remission    Elevated liver function tests    Other emphysema    Essential hypertension    Chronic gout of multiple sites    Osteoarthritis of both knees    Primary insomnia    Benign prostatic hyperplasia with urinary frequency    Immunization deficiency    Non-intractable vomiting with nausea    Medicare annual wellness visit, subsequent    Hyperamylasemia    ELEAZAR (generalized anxiety disorder)    Bronchitis    Chronic cough    Colon cancer screening    Hyperglycemia    Clinical diagnosis of COVID-19    Localized edema    Venous insufficiency    Preoperative clearance    Obesity (BMI 30-39.9)    Encounter for hepatitis C screening test for low risk patient    Hyperhidrosis    Personal history of colonic polyps    Lymphedema    Influenza         Lymphedema       Row Name 24 1300             Subjective Pain    Able to rate subjective pain? yes  -KA      Pre-Treatment Pain Level 0  -KA         Subjective    Subjective Comments Wife helped bandage over weekend, reports that she did not wrap it as tight.  He removed bandaging last night and has not had anything on since, so legs are a little puffier than usual.  -KA         Lymphedema Edema Assessment    Edema Assessment Comment B ankles more edematous  -KA         Manual Lymphatic Drainage    Manual Lymphatic Drainage Comments Short neck, B axilla, SIVA, diaphragmatic breathing, L inguinals, LLE  -KA         Compression/Skin Care    Compression/Skin Care skin care;wrapping location;bandaging  -KA      Skin Care  moisturizing lotion applied  -KA      Wrapping Location lower extremity  -KA      Wrapping Location LE bilateral:;foot to knee  -      Bandage Layers cotton liner;padding/fluff layer;short-stretch bandages (comment size/quantity)  -      Bandaging Comments TG9, Artiflex x 1/2, Komprex kidneys just posterior to ankle malleoli, Rosidal soft ankle to knee, Rosidal K 1-8 cm, 1-10 cm, 1-12 cm, pt's shoes  -      Bandaging Technique circumferential/spiral;moderate compression  figure 8 x 2  -                User Key  (r) = Recorded By, (t) = Taken By, (c) = Cosigned By      Initials Name Provider Type    Ruth Ann Shea, PT Physical Therapist                                   PT Assessment/Plan       Row Name 03/20/24 1419          PT Assessment    Assessment Comments Ankles and lower legs more edematous today after going without bandaging all morning, deferred girth meaurements/ordering LTC until hopefully next visit due to increased edema.  Pt's wife was able to perform compression bandaging but did not obtain same level of compression per pt report, anticipate she will improve with practice.  Performed MLD to BLE this date, applied compression bandaging to BLE with moderate compression this date.  -        PT Plan    PT Plan Comments Reassess girth meaurements, order LTC when appropriate.  Continue compression bandaging.  -               User Key  (r) = Recorded By, (t) = Taken By, (c) = Cosigned By      Initials Name Provider Type    Ruth Ann Shea, PT Physical Therapist                        OP Exercises       Row Name 03/20/24 1424 03/20/24 1300          Subjective    Subjective Comments -- Wife helped bandage over weekend, reports that she did not wrap it as tight.  He removed bandaging last night and has not had anything on since, so legs are a little puffier than usual.  -EUN        Subjective Pain    Able to rate subjective pain? -- yes  -EUN     Pre-Treatment Pain Level -- 0  -KA         Total Minutes    62457 - PT Manual Therapy Minutes 57  -KA --               User Key  (r) = Recorded By, (t) = Taken By, (c) = Cosigned By      Initials Name Provider Type    Ruth Ann Shea PT Physical Therapist                            Manual Rx (Last 36 Hours)       Manual Treatments       Row Name 03/20/24 1421             Total Minutes    87708 - PT Manual Therapy Minutes 57  -KA                User Key  (r) = Recorded By, (t) = Taken By, (c) = Cosigned By      Initials Name Provider Type    Ruth Ann Shea PT Physical Therapist                        Therapy Education  Education Details: Pt instructed to leave bandaging intact for next visit, will reassess girth measurements to order velcro compression.  Given: Edema management, Bandaging/dressing change  Program: New  How Provided: Verbal  Provided to: Patient  Level of Understanding: Verbalized              Time Calculation:   Start Time: 1320  Stop Time: 1417  Time Calculation (min): 57 min  Timed Charges  20625 - PT Manual Therapy Minutes: 57  Total Minutes  Timed Charges Total Minutes: 57   Total Minutes: 57   Therapy Charges for Today       Code Description Service Date Service Provider Modifiers Qty    86130697239  PT MANUAL THERAPY EA 15 MIN 3/20/2024 Ruth Ann Dasilva, PT GP 4                      Ruth Ann Dasilva PT  3/20/2024

## 2024-03-22 ENCOUNTER — HOSPITAL ENCOUNTER (OUTPATIENT)
Dept: PHYSICAL THERAPY | Facility: HOSPITAL | Age: 74
Setting detail: THERAPIES SERIES
Discharge: HOME | End: 2024-03-22
Payer: MEDICARE

## 2024-03-22 DIAGNOSIS — I89.0 LYMPHEDEMA: Primary | ICD-10-CM

## 2024-03-22 PROCEDURE — 97140 MANUAL THERAPY 1/> REGIONS: CPT

## 2024-03-22 PROCEDURE — 97535 SELF CARE MNGMENT TRAINING: CPT

## 2024-03-22 NOTE — THERAPY PROGRESS REPORT/RE-CERT
Outpatient Physical Therapy Lymphedema Progress Note  [facility]     Patient Name: Álvaro Jacquse  : 1950  MRN: [id number]  Today's Date: 3/22/2024        Visit Date: 2024    Visit Dx:    ICD-10-CM ICD-9-CM   1. Lymphedema  I89.0 457.1       Patient Active Problem List   Diagnosis    Seasonal allergic rhinitis due to pollen    Antral gastritis    Coronel's esophagus without dysplasia    Recurrent major depressive disorder, in partial remission    Elevated liver function tests    Other emphysema    Essential hypertension    Chronic gout of multiple sites    Osteoarthritis of both knees    Primary insomnia    Benign prostatic hyperplasia with urinary frequency    Immunization deficiency    Non-intractable vomiting with nausea    Medicare annual wellness visit, subsequent    Hyperamylasemia    ELEAZAR (generalized anxiety disorder)    Bronchitis    Chronic cough    Colon cancer screening    Hyperglycemia    Clinical diagnosis of COVID-19    Localized edema    Venous insufficiency    Preoperative clearance    Obesity (BMI 30-39.9)    Encounter for hepatitis C screening test for low risk patient    Hyperhidrosis    Personal history of colonic polyps    Lymphedema    Influenza         Lymphedema       Row Name 24 1300             Subjective Pain    Able to rate subjective pain? yes  -KA      Pre-Treatment Pain Level 0  -KA         Subjective    Subjective Comments Kept bandaging on as requested, no issues.  -KA         Lymphedema Edema Assessment    Edema Assessment Comment Still has some noted edema in BLE, but reduced from previous visit  -KA         Skin Changes/Observations    Skin Observations Comment No skin concerns  -KA         Lymphedema Measurements    Measurement Type(s) Circumferential  -KA      Circumferential Areas Lower extremities  -KA         BLE Circumferential (cm)    Measurement Location 1 Knee  -KA      Left 1 41.1 cm  -KA      Right 1 42.3 cm  -KA      Measurement Location 2 10 cm  below knee  -KA      Left 2 42.8 cm  -KA      Right 2 46 cm  -KA      Measurement Location 3 20 cm below knee  -KA      Left 3 35.7 cm  -KA      Right 3 36.8 cm  -KA      Measurement Location 4 30 cm below knee  -KA      Left 4 28.4 cm  -KA      Right 4 29 cm  -KA      Measurement Location 5 Ankle  -KA      Left 5 29.1 cm  -KA      Right 5 29 cm  -KA      Measurement Location 6 Midfoot  -KA      Left 6 25.5 cm  -KA      Right 6 25.8 cm  -KA      LLE Circumferential Total 202.6 cm  -KA      RLE Circumferential Total 208.9 cm  -KA         Compression/Skin Care    Compression/Skin Care skin care;wrapping location;bandaging  -KA      Skin Care moisturizing lotion applied  -KA      Wrapping Location lower extremity  -KA      Wrapping Location LE bilateral:;foot to knee  -KA      Bandage Layers cotton liner;padding/fluff layer;short-stretch bandages (comment size/quantity)  -KA      Bandaging Comments TG9, Artiflex x 1/2, Komprex kidneys just posterior to ankle malleoli, Rosidal soft ankle to knee, Rosidal K 1-8 cm, 1-10 cm, 1-12 cm, pt's shoes  -KA      Bandaging Technique circumferential/spiral;moderate compression  figure 8 x 2 at ankle  -KA                User Key  (r) = Recorded By, (t) = Taken By, (c) = Cosigned By      Initials Name Provider Type    Ruth Ann Shea, PT Physical Therapist                                   PT Assessment/Plan       Row Name 03/22/24 9680          PT Assessment    Assessment Comments Girth measurements improved this visit compared to last visit, but still up from last week's assessment.  Overall, pt is still responding well to treatment.  At this time, he has met 3/3 STG and 0/3 LTG.  Made garment recommendation today (Juxtalite HD Size Large Long for BLE), pt will purchase first pair out of pocket as well as Medi Big Araujo and Medi Araujo off which are necessary for independent donning/doffing so that he can receive it quickly and reduce time needed self bandaging.  Continued  with compression bandaging.  Will benefit from continued skilled therapy for at least one more week to further reduce edema, improve skin condition, reduce infection risk, and improve self management of condition.  -KA     Rehab Potential Good  -KA     Patient/caregiver participated in establishment of treatment plan and goals Yes  -KA     Patient would benefit from skilled therapy intervention Yes  -KA        PT Plan    PT Frequency 3x/week  -KA     Predicted Duration of Therapy Intervention (PT) 4-6 weeks  -KA     PT Plan Comments Continue CDT, focus on improving contours of ankles/lower legs.  -KA               User Key  (r) = Recorded By, (t) = Taken By, (c) = Cosigned By      Initials Name Provider Type    Ruth Ann Shea, PT Physical Therapist                        OP Exercises       Row Name 03/22/24 1652 03/22/24 1300          Subjective    Subjective Comments -- Kept bandaging on as requested, no issues.  -EUN        Subjective Pain    Able to rate subjective pain? -- yes  -KA     Pre-Treatment Pain Level -- 0  -KA        Total Minutes    27524 - PT Manual Therapy Minutes 40  -KA --               User Key  (r) = Recorded By, (t) = Taken By, (c) = Cosigned By      Initials Name Provider Type    Ruth Ann Shea, PT Physical Therapist                            Manual Rx (Last 36 Hours)       Manual Treatments       Row Name 03/22/24 1652             Total Minutes    69957 - PT Manual Therapy Minutes 40  -KA                User Key  (r) = Recorded By, (t) = Taken By, (c) = Cosigned By      Initials Name Provider Type    Ruth Ann Shea, PT Physical Therapist                     PT OP Goals       Row Name 03/22/24 1600          PT Short Term Goals    STG Date to Achieve 12/04/23  -KA     STG 1 Patient to demonstrate awareness of condition and precautions for improved prevention, management, care of symptoms, and ease of transition to self care of condition.  -KA     STG 1 Progress Met  -KA      STG 2 Patient/family independent with self-wrapping techniques of compression bandages as indicated for improved self-management of condition.  -KA     STG 2 Progress Met  -KA     STG 3 Patient demonstrates decreased net edema of Bilateral Lower Extremity>/= 5-10 cm for decreased edema symptoms, decreased risk of infection, and improved skin care.  -KA     STG 3 Progress Met  -KA        Long Term Goals    LTG Date to Achieve 01/01/24  -KA     LTG 1 Patient/family independent with self-care techniques for self-management of condition.  -KA     LTG 1 Progress Ongoing  -KA     LTG 2 Patient/family independent with compression garments as indicated for self-management of condition.  -KA     LTG 2 Progress New  -KA     LTG 2 Progress Comments Ordered garments today through [employer]  -KA     LTG 3 Patient demonstrates decreased net edema of Bilateral Lower Extremity >/= 10-20 cm for decreased edema symptoms, decreased risk of infection, and improved skin care.  -KA     LTG 3 Progress Partially Met  -KA               User Key  (r) = Recorded By, (t) = Taken By, (c) = Cosigned By      Initials Name Provider Type    Ruth Ann Shea, PT Physical Therapist                    Therapy Education  Education Details: Discussed girth measurements, made garment recommendation for pt to purchase out of pocket as he prefers to have long term compression vs. self bandaging when therapist will be out of town.  Recommending Juxtalite HD size Large Long for BLE as well as Medi Big Araujo and Medi Araujo off as he is unable to reach feet to don/doff socks, ordered through [employer] and will provide information next week to submit for reimbursement through Medicare.  If he likes compression, 2 additional pairs can be ordered using insurance benefits, but it will take a longer time to receive them.  Given: Edema management, Bandaging/dressing change  Program: New  How Provided: Verbal  Provided to: Patient  Level of  Understanding: Verbalized  08428 - PT Self Care/Mgmt Minutes: 15              Time Calculation:   Start Time: 1315  Stop Time: 1413  Time Calculation (min): 58 min  Timed Charges  51627 - PT Manual Therapy Minutes: 40  20187 - PT Self Care/Mgmt Minutes: 15  Total Minutes  Timed Charges Total Minutes: 55   Total Minutes: 55   Therapy Charges for Today       Code Description Service Date Service Provider Modifiers Qty    10641410395 HC PT MANUAL THERAPY EA 15 MIN 3/22/2024 Ruth Ann Dasilva, PT GP 3    37881188354 HC PT SELF CARE/MGMT/TRAIN EA 15 MIN 3/22/2024 Ruth Ann Dasilva, PT GP 1                      Ruth Ann Dasilva, PT  3/22/2024

## 2024-03-25 ENCOUNTER — HOSPITAL ENCOUNTER (OUTPATIENT)
Dept: PHYSICAL THERAPY | Facility: HOSPITAL | Age: 74
Setting detail: THERAPIES SERIES
Discharge: HOME | End: 2024-03-25
Payer: MEDICARE

## 2024-03-25 DIAGNOSIS — I89.0 LYMPHEDEMA: Primary | ICD-10-CM

## 2024-03-25 PROCEDURE — 97140 MANUAL THERAPY 1/> REGIONS: CPT

## 2024-03-25 NOTE — THERAPY TREATMENT NOTE
Outpatient Physical Therapy Lymphedema Treatment Note  [facility]     Patient Name: Álvaro Jacques  : 1950  MRN: [id number]  Today's Date: 3/25/2024        Visit Date: 2024    Visit Dx:    ICD-10-CM ICD-9-CM   1. Lymphedema  I89.0 457.1       Patient Active Problem List   Diagnosis    Seasonal allergic rhinitis due to pollen    Antral gastritis    Coronel's esophagus without dysplasia    Recurrent major depressive disorder, in partial remission    Elevated liver function tests    Other emphysema    Essential hypertension    Chronic gout of multiple sites    Osteoarthritis of both knees    Primary insomnia    Benign prostatic hyperplasia with urinary frequency    Immunization deficiency    Non-intractable vomiting with nausea    Medicare annual wellness visit, subsequent    Hyperamylasemia    ELEAZAR (generalized anxiety disorder)    Bronchitis    Chronic cough    Colon cancer screening    Hyperglycemia    Clinical diagnosis of COVID-19    Localized edema    Venous insufficiency    Preoperative clearance    Obesity (BMI 30-39.9)    Encounter for hepatitis C screening test for low risk patient    Hyperhidrosis    Personal history of colonic polyps    Lymphedema    Influenza         Lymphedema       Row Name 24 1300             Subjective Pain    Able to rate subjective pain? yes  -KA      Pre-Treatment Pain Level 0  -KA         Subjective    Subjective Comments Pt kept bandaging on until this morning, then removed to shower.  States that his legs look pretty good.  -KA         Manual Lymphatic Drainage    Manual Lymphatic Drainage Comments Short neck, B axilla, B IA, diaphragmatic breathing, B inguinals, BLE  -KA         Compression/Skin Care    Compression/Skin Care skin care;wrapping location;bandaging  -KA      Skin Care moisturizing lotion applied  -KA      Wrapping Location lower extremity  -KA      Wrapping Location LE bilateral:;foot to knee  -KA      Bandage Layers cotton  liner;padding/fluff layer;short-stretch bandages (comment size/quantity)  -      Bandaging Comments TG9, Artiflex x 1/2, Komprex kidneys just posterior to ankle malleoli, Rosidal soft ankle to knee, Rosidal K 1-8 cm, 1-10 cm, 1-12 cm, pt's shoes  -      Bandaging Technique circumferential/spiral;moderate compression  -                User Key  (r) = Recorded By, (t) = Taken By, (c) = Cosigned By      Initials Name Provider Type    Ruth Ann Shea, PT Physical Therapist                                   PT Assessment/Plan       Row Name 03/25/24 1412          PT Assessment    Assessment Comments Pt is still responding well to treatment, lower legs appear reduced from last visit.  Performed MLD to BLE, continued with compression bandaging with 3 SS bandages per leg and moderate compression.  -        PT Plan    PT Plan Comments Continue CDT, focus on improving contours of ankles/lower legs  -               User Key  (r) = Recorded By, (t) = Taken By, (c) = Cosigned By      Initials Name Provider Type    Ruth Ann Shea, PT Physical Therapist                        OP Exercises       Row Name 03/25/24 1413 03/25/24 1300          Subjective    Subjective Comments -- Pt kept bandaging on until this morning, then removed to shower.  States that his legs look pretty good.  -EUN        Subjective Pain    Able to rate subjective pain? -- yes  -EUN     Pre-Treatment Pain Level -- 0  -KA        Total Minutes    20963 - PT Manual Therapy Minutes 55  -KA --               User Key  (r) = Recorded By, (t) = Taken By, (c) = Cosigned By      Initials Name Provider Type    uRth Ann Shea, PT Physical Therapist                            Manual Rx (Last 36 Hours)       Manual Treatments       Row Name 03/25/24 1413             Total Minutes    99610 - PT Manual Therapy Minutes 55  -KA                User Key  (r) = Recorded By, (t) = Taken By, (c) = Cosigned By      Initials Name Provider Type    EUN Dasilva  Ruth Ann, PT Physical Therapist                                       Time Calculation:   Start Time: 1315  Stop Time: 1410  Time Calculation (min): 55 min  Timed Charges  34076 - PT Manual Therapy Minutes: 55  Total Minutes  Timed Charges Total Minutes: 55   Total Minutes: 55   Therapy Charges for Today       Code Description Service Date Service Provider Modifiers Qty    53305454414 HC PT MANUAL THERAPY EA 15 MIN 3/25/2024 Ruth Ann Dasilva, PT GP 4                      Ruth Ann Dasilva, PT  3/25/2024

## 2024-03-27 ENCOUNTER — HOSPITAL ENCOUNTER (OUTPATIENT)
Dept: PHYSICAL THERAPY | Facility: HOSPITAL | Age: 74
Setting detail: THERAPIES SERIES
Discharge: HOME | End: 2024-03-27
Payer: MEDICARE

## 2024-03-27 DIAGNOSIS — I89.0 LYMPHEDEMA: Primary | ICD-10-CM

## 2024-03-27 PROCEDURE — 97535 SELF CARE MNGMENT TRAINING: CPT

## 2024-03-27 NOTE — THERAPY DISCHARGE NOTE
Outpatient Physical Therapy Lymphedema Progress Note/Discharge Summary  [facility]     Patient Name: Álvaro Jacques  : 1950  MRN: [id number]  Today's Date: 3/27/2024      Visit Date: 2024    Visit Dx:    ICD-10-CM ICD-9-CM   1. Lymphedema  I89.0 457.1       Patient Active Problem List   Diagnosis    Seasonal allergic rhinitis due to pollen    Antral gastritis    Coronel's esophagus without dysplasia    Recurrent major depressive disorder, in partial remission    Elevated liver function tests    Other emphysema    Essential hypertension    Chronic gout of multiple sites    Osteoarthritis of both knees    Primary insomnia    Benign prostatic hyperplasia with urinary frequency    Immunization deficiency    Non-intractable vomiting with nausea    Medicare annual wellness visit, subsequent    Hyperamylasemia    ELEAZAR (generalized anxiety disorder)    Bronchitis    Chronic cough    Colon cancer screening    Hyperglycemia    Clinical diagnosis of COVID-19    Localized edema    Venous insufficiency    Preoperative clearance    Obesity (BMI 30-39.9)    Encounter for hepatitis C screening test for low risk patient    Hyperhidrosis    Personal history of colonic polyps    Lymphedema    Influenza         Lymphedema       Row Name 24 1300             Subjective Pain    Able to rate subjective pain? yes  -KA      Pre-Treatment Pain Level 0  -KA         Subjective    Subjective Comments Received garments and donning/doffing aids just before leaving home.  -KA         LLIS - Physical Concerns    The amount of pain associated with my lymphedema is: 1  -KA      The amount of limb heaviness associated with my lymphedema is: 1  -KA      The amount of skin tightness associated with my lymphedema is: 1  -KA      The size of my swollen limb(s) seems: 0  -KA      Lymphedema affects the movement of my swollen limb(s): 1  -KA      The strength in my swollen limb(s) is: 1  -KA         LLIS - Psychosocial Concerns     "Lymphedema affects my body image (i.e., \"how I think I look\"). 1  -KA      Lymphedema affects my socializing with others. 1  -KA      Lymphedema affects my intimate relations with spouse or partner (rate 0 if not applicable 0  -KA      Lymphedema \"gets me down\" (i.e., depression, frustration, or anger) 1  -KA      I must rely on others for help due to my lymphedema. 1  -KA      I know what to do to manage my lymphedema 0  -KA         LLIS - Functional Concerns    Lymphedema affects my ability to perform self-care activities (i.e. eating, dressing, hygiene) 0  -KA      Lymphedema affects my ability to perform routine home or work-related activities. 1  -KA      Lymphedema affects my performance of preferred leisure activities. 1  -KA      Lymphedema affects proper fit of clothing/shoes 1  -KA      Lymphedema affects my sleep 1  -KA         Lymphedema Measurements    Measurement Type(s) Circumferential  -KA      Circumferential Areas Lower extremities  -KA         BLE Circumferential (cm)    Measurement Location 1 Knee  -KA      Left 1 41.2 cm  -KA      Right 1 41.4 cm  -KA      Measurement Location 2 10 cm below knee  -KA      Left 2 43.2 cm  -KA      Right 2 45 cm  -KA      Measurement Location 3 20 cm below knee  -KA      Left 3 33.9 cm  -KA      Right 3 36.8 cm  -KA      Measurement Location 4 30 cm below knee  -KA      Left 4 28 cm  -KA      Right 4 28.5 cm  -KA      Measurement Location 5 Ankle  -KA      Left 5 28.5 cm  -KA      Right 5 29.4 cm  -KA      Measurement Location 6 Midfoot  -KA      Left 6 24.6 cm  -KA      Right 6 25.3 cm  -KA      LLE Circumferential Total 199.4 cm  -KA      RLE Circumferential Total 206.4 cm  -KA         Manual Lymphatic Drainage    Manual Lymphatic Drainage Comments No MLD today  -KA         Compression/Skin Care    Compression/Skin Care compression garment  -KA      Compression Garment Comments Following training, pt applied Juxtalite HD size Large Long to BLE, fit is " appropriate  -KA         Lymphedema Life Impact Scale Totals    A.  Total Q1 - Q17 (Do not include Q18) 13  -KA      B.  Total number of questions answered (Q1-Q17) 17  -KA      C. Divide A by B 0.76  -KA      D. Multiple C by 25 19  -KA                User Key  (r) = Recorded By, (t) = Taken By, (c) = Cosigned By      Initials Name Provider Type    Ruth Ann Shea, PT Physical Therapist                                   PT Assessment/Plan       Row Name 03/27/24 1414 03/27/24 1412       PT Assessment    Functional Limitations -- Impaired gait;Limitation in home management;Limitations in community activities;Limitations in functional capacity and performance;Performance in leisure activities;Performance in self-care ADL  -KA    Impairments -- Edema;Balance;Endurance;Gait;Impaired lymphatic circulation;Integumentary integrity;Muscle strength;Pain;Range of motion;Sensation  -KA    Assessment Comments Pt has met or partially met all functional goals at this time. He has seen net decreased edema of 15.9 cm in LLE and 6.8 cm in RLE since starting treatment. Skin condition has improved, not dry and no fibrosis noted, near normal contours B ankles. Score on Lymphedema Life Impact Scale has reduced significantly to 19 vs 82.25 at initial evaluation. Pt is independent with donning/doffing of Juxtalite HD garments with use of Medi Big Araujo and Medi araujo off, will transition to independent self management at this time.  -KA --    Rehab Potential Good  -KA --    Patient/caregiver participated in establishment of treatment plan and goals Yes  -KA --    Patient would benefit from skilled therapy intervention Yes  -KA --       PT Plan    PT Plan Comments Discharge to independent self management, re-evaluation as needed  -KA --              User Key  (r) = Recorded By, (t) = Taken By, (c) = Cosigned By      Initials Name Provider Type    Ruth Ann Shea, PT Physical Therapist                          OP Exercises        Row Name 03/27/24 1300             Subjective    Subjective Comments Received garments and donning/doffing aids just before leaving home.  -KA         Subjective Pain    Able to rate subjective pain? yes  -KA      Pre-Treatment Pain Level 0  -KA                User Key  (r) = Recorded By, (t) = Taken By, (c) = Cosigned By      Initials Name Provider Type    Ruth Ann Shea, PT Physical Therapist                                   PT OP Goals       Row Name 03/27/24 1400          PT Short Term Goals    STG Date to Achieve 12/04/23  -KA     STG 1 Patient to demonstrate awareness of condition and precautions for improved prevention, management, care of symptoms, and ease of transition to self care of condition.  -KA     STG 1 Progress Met  -KA     STG 2 Patient/family independent with self-wrapping techniques of compression bandages as indicated for improved self-management of condition.  -KA     STG 2 Progress Met  -KA     STG 3 Patient demonstrates decreased net edema of Bilateral Lower Extremity>/= 5-10 cm for decreased edema symptoms, decreased risk of infection, and improved skin care.  -KA     STG 3 Progress Met  -KA        Long Term Goals    LTG Date to Achieve 01/01/24  -KA     LTG 1 Patient/family independent with self-care techniques for self-management of condition.  -KA     LTG 1 Progress Met  -KA     LTG 2 Patient/family independent with compression garments as indicated for self-management of condition.  -KA     LTG 2 Progress Met  -KA     LTG 3 Patient demonstrates decreased net edema of Bilateral Lower Extremity >/= 10-20 cm for decreased edema symptoms, decreased risk of infection, and improved skin care.  -KA     LTG 3 Progress Partially Met  -KA               User Key  (r) = Recorded By, (t) = Taken By, (c) = Cosigned By      Initials Name Provider Type    Ruth Ann Shea, LUIS Physical Therapist                    Therapy Education  Education Details: Discussed wear/wash schedule for  garments, placed order for remaining 2 pair of garments via [facility] [employer] [facility] per his request.  Pt instructed in donning/doffing of garments and use of Medi Big Araujo and Medi Araujo Off, independent with donning/doffing following training.  Target compression is 30-40 mmHg, but can do 20-30 mmHg as long as all straps have consistent compression.  Can reorder garments in October 2024 by calling [employer].  Juxtalite HD are safe to be worn at night, but not necessary unless he sees increased swelling overnight.  Should don garments first thing in AM, remove at night.  Eucerin to be applied prior to applying garments or after removing daily.  Pt provided with information to submit for Medicare Reimbursement for garments ordered through [employer].  Given: Edema management, Bandaging/dressing change  Program: New  How Provided: Verbal, Demonstration  Provided to: Patient  Level of Understanding: Verbalized  62045 - PT Self Care/Mgmt Minutes: 45              Time Calculation:   Start Time: 1320  Stop Time: 1410  Time Calculation (min): 50 min  Timed Charges  51415 - PT Self Care/Mgmt Minutes: 45  Total Minutes  Timed Charges Total Minutes: 45   Total Minutes: 45     Therapy Charges for Today       Code Description Service Date Service Provider Modifiers Qty    [id number]  PT SELF CARE/MGMT/TRAIN EA 15 MIN 3/27/2024 Ruth Ann Dasilva, PT GP 3                   OP PT Discharge Summary  Date of Discharge: 03/27/24  Reason for Discharge: All goals achieved  Outcomes Achieved: Able to achieve all goals within established timeline  Discharge Destination: Home with home program  Discharge Instructions/Additional Comments: See education from note on 3/27/24.      Ruth Ann Dasilva, PT  3/27/2024

## 2024-04-27 DIAGNOSIS — F41.1 GAD (GENERALIZED ANXIETY DISORDER): ICD-10-CM

## 2024-04-27 RX ORDER — ESCITALOPRAM OXALATE 10 MG/1
10 TABLET ORAL DAILY
Qty: 90 TABLET | Refills: 1 | Status: SHIPPED | OUTPATIENT
Start: 2024-04-27

## 2024-04-29 ENCOUNTER — OFFICE VISIT (OUTPATIENT)
Dept: FAMILY MEDICINE CLINIC | Facility: CLINIC | Age: 74
End: 2024-04-29
Payer: MEDICARE

## 2024-04-29 VITALS
BODY MASS INDEX: 39.9 KG/M2 | WEIGHT: 285 LBS | SYSTOLIC BLOOD PRESSURE: 118 MMHG | RESPIRATION RATE: 17 BRPM | OXYGEN SATURATION: 98 % | DIASTOLIC BLOOD PRESSURE: 72 MMHG | HEIGHT: 71 IN | HEART RATE: 76 BPM | TEMPERATURE: 97.6 F

## 2024-04-29 DIAGNOSIS — M1A.09X0 CHRONIC GOUT OF MULTIPLE SITES, UNSPECIFIED CAUSE: ICD-10-CM

## 2024-04-29 DIAGNOSIS — R53.82 CHRONIC FATIGUE: ICD-10-CM

## 2024-04-29 DIAGNOSIS — I10 ESSENTIAL HYPERTENSION: ICD-10-CM

## 2024-04-29 DIAGNOSIS — R35.0 BENIGN PROSTATIC HYPERPLASIA WITH URINARY FREQUENCY: ICD-10-CM

## 2024-04-29 DIAGNOSIS — R73.9 HYPERGLYCEMIA: ICD-10-CM

## 2024-04-29 DIAGNOSIS — N40.1 BENIGN PROSTATIC HYPERPLASIA WITH URINARY FREQUENCY: ICD-10-CM

## 2024-04-29 DIAGNOSIS — Z00.00 MEDICARE ANNUAL WELLNESS VISIT, SUBSEQUENT: Primary | ICD-10-CM

## 2024-04-29 PROCEDURE — 1170F FXNL STATUS ASSESSED: CPT | Performed by: FAMILY MEDICINE

## 2024-04-29 PROCEDURE — 99214 OFFICE O/P EST MOD 30 MIN: CPT | Performed by: FAMILY MEDICINE

## 2024-04-29 PROCEDURE — 3074F SYST BP LT 130 MM HG: CPT | Performed by: FAMILY MEDICINE

## 2024-04-29 PROCEDURE — 3078F DIAST BP <80 MM HG: CPT | Performed by: FAMILY MEDICINE

## 2024-04-29 PROCEDURE — G0439 PPPS, SUBSEQ VISIT: HCPCS | Performed by: FAMILY MEDICINE

## 2024-04-29 NOTE — PROGRESS NOTES
The ABCs of the Annual Wellness Visit  Subsequent Medicare Wellness Visit    Subjective    Álvaro Jacques is a 73 y.o. male who presents for a Subsequent Medicare Wellness Visit.    The following portions of the patient's history were reviewed and   updated as appropriate: allergies, current medications, past family history, past medical history, past social history, past surgical history, and problem list.    Compared to one year ago, the patient feels his physical   health is the same.    Compared to one year ago, the patient feels his mental   health is the same.    Recent Hospitalizations:  He was not admitted to the hospital during the last year.       Current Medical Providers:  Patient Care Team:  Corey Martínez MD as PCP - General (Family Medicine)    Outpatient Medications Prior to Visit   Medication Sig Dispense Refill    cetirizine (zyrTEC) 5 MG tablet Take 1 tablet by mouth Daily.      escitalopram (LEXAPRO) 10 MG tablet Take 1 tablet by mouth daily. 90 tablet 1    fluticasone (FLONASE) 50 MCG/ACT nasal spray 1 spray into the nostril(s) as directed by provider Daily. 16 g 5    ibuprofen (ADVIL,MOTRIN) 600 MG tablet Take 1 tablet by mouth Daily As Needed for Mild Pain.      metoprolol tartrate (LOPRESSOR) 50 MG tablet TAKE 1 TABLET BY MOUTH TWO TIMES A  tablet 3    montelukast (SINGULAIR) 10 MG tablet TAKE 1 TABLET BY MOUTH EVERY DAY AT NIGHT 90 tablet 3    pantoprazole (PROTONIX) 40 MG EC tablet Take 1 tablet by mouth 2 (Two) Times a Day. 180 tablet 3    spironolactone (ALDACTONE) 25 MG tablet Take 1 tablet by mouth 2 (Two) Times a Day. 180 tablet 3    oseltamivir (Tamiflu) 75 MG capsule Take 1 capsule by mouth 2 (Two) Times a Day. (Patient not taking: Reported on 4/29/2024) 10 capsule 0     No facility-administered medications prior to visit.       No opioid medication identified on active medication list. I have reviewed chart for other potential  high risk medication/s and harmful drug  "interactions in the elderly.        Aspirin is not on active medication list.  Aspirin use is not indicated based on review of current medical condition/s. Risk of harm outweighs potential benefits.  .    Patient Active Problem List   Diagnosis    Seasonal allergic rhinitis due to pollen    Antral gastritis    Coronel's esophagus without dysplasia    Recurrent major depressive disorder, in partial remission    Elevated liver function tests    Other emphysema    Essential hypertension    Chronic gout of multiple sites    Osteoarthritis of both knees    Primary insomnia    Benign prostatic hyperplasia with urinary frequency    Immunization deficiency    Non-intractable vomiting with nausea    Medicare annual wellness visit, subsequent    Hyperamylasemia    ELEAZAR (generalized anxiety disorder)    Bronchitis    Chronic cough    Colon cancer screening    Hyperglycemia    Clinical diagnosis of COVID-19    Localized edema    Venous insufficiency    Preoperative clearance    Obesity (BMI 30-39.9)    Encounter for hepatitis C screening test for low risk patient    Hyperhidrosis    Personal history of colonic polyps    Lymphedema    Influenza    Chronic fatigue     Advance Care Planning   Advance Care Planning     Advance Directive is not on file.  ACP discussion was held with the patient during this visit. Patient has an advance directive (not in EMR), copy requested.     Objective    Vitals:    04/29/24 0925   BP: 118/72   BP Location: Left arm   Patient Position: Sitting   Cuff Size: Adult   Pulse: 76   Resp: 17   Temp: 97.6 °F (36.4 °C)   TempSrc: Temporal   SpO2: 98%   Weight: 129 kg (285 lb)   Height: 180.3 cm (70.98\")   PainSc:   4   PainLoc: Leg     Estimated body mass index is 39.77 kg/m² as calculated from the following:    Height as of this encounter: 180.3 cm (70.98\").    Weight as of this encounter: 129 kg (285 lb).    Class 2 Severe Obesity (BMI >=35 and <=39.9). Obesity-related health conditions include the " following: hypertension. Obesity is unchanged. BMI is is above average; BMI management plan is completed. We discussed portion control and increasing exercise.      Does the patient have evidence of cognitive impairment? No          HEALTH RISK ASSESSMENT    Smoking Status:  Social History     Tobacco Use   Smoking Status Former    Current packs/day: 0.00    Types: Cigarettes, Cigars    Quit date: 1970    Years since quittin.7   Smokeless Tobacco Never   Tobacco Comments    still smokes cigars occasionally      Alcohol Consumption:  Social History     Substance and Sexual Activity   Alcohol Use Yes    Alcohol/week: 10.0 standard drinks of alcohol    Types: 10 Shots of liquor per week    Comment: per week//Caffeine use: 2 cups daily      Fall Risk Screen:    EBEN Fall Risk Assessment has not been completed.    Depression Screenin/29/2024     9:51 AM   PHQ-2/PHQ-9 Depression Screening   Little Interest or Pleasure in Doing Things 0-->not at all   Feeling Down, Depressed or Hopeless 0-->not at all   PHQ-9: Brief Depression Severity Measure Score 0       Health Habits and Functional and Cognitive Screenin/29/2024     9:00 AM   Functional & Cognitive Status   Do you have difficulty preparing food and eating? No   Do you have difficulty bathing yourself, getting dressed or grooming yourself? No   Do you have difficulty using the toilet? No   Do you have difficulty moving around from place to place? No   Do you have trouble with steps or getting out of a bed or a chair? No   Current Diet Well Balanced Diet   Dental Exam Up to date   Eye Exam Up to date   Exercise (times per week) 7 times per week   Current Exercises Include Stationary Bicycling/Spin Class   Do you need help using the phone?  No   Are you deaf or do you have serious difficulty hearing?  No   Do you need help to go to places out of walking distance? No   Do you need help shopping? No   Do you need help preparing meals?  No   Do  you need help with housework?  No   Do you need help with laundry? No   Do you need help taking your medications? No   Do you need help managing money? No   Do you ever drive or ride in a car without wearing a seat belt? No   Have you felt unusual stress, anger or loneliness in the last month? No   Who do you live with? Spouse   If you need help, do you have trouble finding someone available to you? No   Have you been bothered in the last four weeks by sexual problems? No   Do you have difficulty concentrating, remembering or making decisions? No       Age-appropriate Screening Schedule:  Refer to the list below for future screening recommendations based on patient's age, sex and/or medical conditions. Orders for these recommended tests are listed in the plan section. The patient has been provided with a written plan.    Health Maintenance   Topic Date Due    DXA SCAN  Never done    TDAP/TD VACCINES (1 - Tdap) Never done    AAA SCREEN (ONE-TIME)  Never done    COVID-19 Vaccine (5 - 2023-24 season) 09/01/2023    INFLUENZA VACCINE  08/01/2024    GASTROSCOPY (EGD)  09/01/2024    ANNUAL WELLNESS VISIT  04/29/2025    BMI FOLLOWUP  04/29/2025    COLORECTAL CANCER SCREENING  09/01/2026    HEPATITIS C SCREENING  Completed    RSV Vaccine - Adults  Completed    Pneumococcal Vaccine 65+  Completed    ZOSTER VACCINE  Completed                  CMS Preventative Services Quick Reference  Risk Factors Identified During Encounter  Inactivity/Sedentary: Patient was advised to exercise at least 150 minutes a week per CDC recommendations.  The above risks/problems have been discussed with the patient.  Pertinent information has been shared with the patient in the After Visit Summary.  An After Visit Summary and PPPS were made available to the patient.    Follow Up:   Next Medicare Wellness visit to be scheduled in 1 year.       Additional E&M Note during same encounter follows:  Patient has multiple medical problems which are  "significant and separately identifiable that require additional work above and beyond the Medicare Wellness Visit.      Chief Complaint  Anxiety, Hypertension, Depression, and Leg Swelling    Subjective        HPI  Álvaro Jacques is also being seen today for fatigue.  Going on months.      Review of Systems   Constitutional:  Positive for fatigue. Negative for chills and diaphoresis.   HENT:  Negative for rhinorrhea.    Respiratory:  Negative for cough and shortness of breath.    Cardiovascular:  Negative for chest pain and leg swelling.   Gastrointestinal:  Negative for abdominal distention and abdominal pain.   Musculoskeletal:  Negative for arthralgias and back pain.   Skin:  Negative for rash.       Objective   Vital Signs:  /72 (BP Location: Left arm, Patient Position: Sitting, Cuff Size: Adult)   Pulse 76   Temp 97.6 °F (36.4 °C) (Temporal)   Resp 17   Ht 180.3 cm (70.98\")   Wt 129 kg (285 lb)   SpO2 98%   BMI 39.77 kg/m²     Physical Exam  Vitals reviewed.   Constitutional:       Appearance: He is well-developed. He is not diaphoretic.   HENT:      Head: Normocephalic and atraumatic.   Eyes:      General: No scleral icterus.     Pupils: Pupils are equal, round, and reactive to light.   Neck:      Thyroid: No thyromegaly.   Cardiovascular:      Rate and Rhythm: Normal rate and regular rhythm.      Heart sounds: No murmur heard.     No friction rub. No gallop.   Pulmonary:      Effort: Pulmonary effort is normal. No respiratory distress.      Breath sounds: No wheezing or rales.   Chest:      Chest wall: No tenderness.   Abdominal:      General: Bowel sounds are normal. There is no distension.      Palpations: Abdomen is soft.      Tenderness: There is no abdominal tenderness.   Musculoskeletal:         General: No deformity. Normal range of motion.   Lymphadenopathy:      Cervical: No cervical adenopathy.   Skin:     General: Skin is warm and dry.      Findings: No rash.   Neurological:      " Cranial Nerves: No cranial nerve deficit.      Motor: No abnormal muscle tone.                         Assessment and Plan   Diagnoses and all orders for this visit:    1. Medicare annual wellness visit, subsequent (Primary)    2. Hyperglycemia  -     Hemoglobin A1c    3. Chronic gout of multiple sites, unspecified cause  -     Uric Acid    4. Benign prostatic hyperplasia with urinary frequency  -     PSA DIAGNOSTIC    5. Chronic fatigue  -     CBC & Differential  -     Comprehensive Metabolic Panel  -     Vitamin B12    6. Essential hypertension  -     Lipid Panel With / Chol / HDL Ratio      Chronic fatigue.  New problem.  Check Cbc, CMP, TSH, B12.      Preventive Counseling:  Encouraged to stay active.  Shingrix utd. Tdap recommended.  HEP A UTD.  Pneumovax UTD.  Colonoscopy UTD.    Dentist UTD.  Optho recommended.           Follow Up   Return in about 6 months (around 10/29/2024).  Patient was given instructions and counseling regarding his condition or for health maintenance advice. Please see specific information pulled into the AVS if appropriate.

## 2024-04-30 LAB
ALBUMIN SERPL-MCNC: 4 G/DL (ref 3.5–5.2)
ALBUMIN/GLOB SERPL: 1.7 G/DL
ALP SERPL-CCNC: 62 U/L (ref 39–117)
ALT SERPL-CCNC: 26 U/L (ref 1–41)
AST SERPL-CCNC: 26 U/L (ref 1–40)
BASOPHILS # BLD AUTO: 0.1 10*3/MM3 (ref 0–0.2)
BASOPHILS NFR BLD AUTO: 1.9 % (ref 0–1.5)
BILIRUB SERPL-MCNC: 1.6 MG/DL (ref 0–1.2)
BUN SERPL-MCNC: 11 MG/DL (ref 8–23)
BUN/CREAT SERPL: 13.3 (ref 7–25)
CALCIUM SERPL-MCNC: 9 MG/DL (ref 8.6–10.5)
CHLORIDE SERPL-SCNC: 103 MMOL/L (ref 98–107)
CHOLEST SERPL-MCNC: 121 MG/DL (ref 0–200)
CHOLEST/HDLC SERPL: 1.83 {RATIO}
CO2 SERPL-SCNC: 24.2 MMOL/L (ref 22–29)
CREAT SERPL-MCNC: 0.83 MG/DL (ref 0.76–1.27)
EGFRCR SERPLBLD CKD-EPI 2021: 93.6 ML/MIN/1.73
EOSINOPHIL # BLD AUTO: 0.08 10*3/MM3 (ref 0–0.4)
EOSINOPHIL NFR BLD AUTO: 1.5 % (ref 0.3–6.2)
ERYTHROCYTE [DISTWIDTH] IN BLOOD BY AUTOMATED COUNT: 15.2 % (ref 12.3–15.4)
GLOBULIN SER CALC-MCNC: 2.3 GM/DL
GLUCOSE SERPL-MCNC: 95 MG/DL (ref 65–99)
HBA1C MFR BLD: 4.7 % (ref 4.8–5.6)
HCT VFR BLD AUTO: 42 % (ref 37.5–51)
HDLC SERPL-MCNC: 66 MG/DL (ref 40–60)
HGB BLD-MCNC: 14.5 G/DL (ref 13–17.7)
IMM GRANULOCYTES # BLD AUTO: 0.02 10*3/MM3 (ref 0–0.05)
IMM GRANULOCYTES NFR BLD AUTO: 0.4 % (ref 0–0.5)
LDLC SERPL CALC-MCNC: 41 MG/DL (ref 0–100)
LYMPHOCYTES # BLD AUTO: 1.08 10*3/MM3 (ref 0.7–3.1)
LYMPHOCYTES NFR BLD AUTO: 20.1 % (ref 19.6–45.3)
MCH RBC QN AUTO: 29.8 PG (ref 26.6–33)
MCHC RBC AUTO-ENTMCNC: 34.5 G/DL (ref 31.5–35.7)
MCV RBC AUTO: 86.4 FL (ref 79–97)
MONOCYTES # BLD AUTO: 0.47 10*3/MM3 (ref 0.1–0.9)
MONOCYTES NFR BLD AUTO: 8.8 % (ref 5–12)
NEUTROPHILS # BLD AUTO: 3.61 10*3/MM3 (ref 1.7–7)
NEUTROPHILS NFR BLD AUTO: 67.3 % (ref 42.7–76)
NRBC BLD AUTO-RTO: 0 /100 WBC (ref 0–0.2)
PLATELET # BLD AUTO: 164 10*3/MM3 (ref 140–450)
POTASSIUM SERPL-SCNC: 4.2 MMOL/L (ref 3.5–5.2)
PROT SERPL-MCNC: 6.3 G/DL (ref 6–8.5)
PSA SERPL-MCNC: 2.38 NG/ML (ref 0–4)
RBC # BLD AUTO: 4.86 10*6/MM3 (ref 4.14–5.8)
SODIUM SERPL-SCNC: 138 MMOL/L (ref 136–145)
TRIGL SERPL-MCNC: 68 MG/DL (ref 0–150)
URATE SERPL-MCNC: 5 MG/DL (ref 3.4–7)
VIT B12 SERPL-MCNC: 321 PG/ML (ref 211–946)
VLDLC SERPL CALC-MCNC: 14 MG/DL (ref 5–40)
WBC # BLD AUTO: 5.36 10*3/MM3 (ref 3.4–10.8)

## 2024-08-27 DIAGNOSIS — I10 ESSENTIAL HYPERTENSION: ICD-10-CM

## 2024-08-27 RX ORDER — METOPROLOL TARTRATE 50 MG
50 TABLET ORAL 2 TIMES DAILY
Qty: 180 TABLET | Refills: 3 | Status: SHIPPED | OUTPATIENT
Start: 2024-08-27

## 2024-09-13 DIAGNOSIS — R05.3 CHRONIC COUGH: ICD-10-CM

## 2024-09-13 DIAGNOSIS — J30.1 SEASONAL ALLERGIC RHINITIS DUE TO POLLEN: ICD-10-CM

## 2024-09-13 RX ORDER — MONTELUKAST SODIUM 10 MG/1
TABLET ORAL
Qty: 90 TABLET | Refills: 0 | Status: SHIPPED | OUTPATIENT
Start: 2024-09-13

## 2024-10-29 ENCOUNTER — OFFICE VISIT (OUTPATIENT)
Dept: FAMILY MEDICINE CLINIC | Facility: CLINIC | Age: 74
End: 2024-10-29
Payer: MEDICARE

## 2024-10-29 VITALS
DIASTOLIC BLOOD PRESSURE: 82 MMHG | HEIGHT: 71 IN | SYSTOLIC BLOOD PRESSURE: 128 MMHG | BODY MASS INDEX: 38.95 KG/M2 | TEMPERATURE: 97.2 F | RESPIRATION RATE: 17 BRPM | HEART RATE: 63 BPM | WEIGHT: 278.2 LBS | OXYGEN SATURATION: 96 %

## 2024-10-29 DIAGNOSIS — R60.0 LOCALIZED EDEMA: ICD-10-CM

## 2024-10-29 DIAGNOSIS — I89.0 LYMPHEDEMA: Primary | ICD-10-CM

## 2024-10-29 DIAGNOSIS — I10 ESSENTIAL HYPERTENSION: ICD-10-CM

## 2024-10-29 DIAGNOSIS — R73.9 HYPERGLYCEMIA: ICD-10-CM

## 2024-10-29 PROCEDURE — 1125F AMNT PAIN NOTED PAIN PRSNT: CPT | Performed by: FAMILY MEDICINE

## 2024-10-29 PROCEDURE — 99214 OFFICE O/P EST MOD 30 MIN: CPT | Performed by: FAMILY MEDICINE

## 2024-10-29 PROCEDURE — G2211 COMPLEX E/M VISIT ADD ON: HCPCS | Performed by: FAMILY MEDICINE

## 2024-10-29 PROCEDURE — 3079F DIAST BP 80-89 MM HG: CPT | Performed by: FAMILY MEDICINE

## 2024-10-29 PROCEDURE — 3074F SYST BP LT 130 MM HG: CPT | Performed by: FAMILY MEDICINE

## 2024-10-29 RX ORDER — TAMSULOSIN HYDROCHLORIDE 0.4 MG/1
1 CAPSULE ORAL DAILY
Qty: 90 CAPSULE | Refills: 3 | Status: SHIPPED | OUTPATIENT
Start: 2024-10-29

## 2024-10-29 RX ORDER — MELOXICAM 15 MG/1
15 TABLET ORAL DAILY
COMMUNITY

## 2024-10-29 RX ORDER — SPIRONOLACTONE 50 MG/1
50 TABLET, FILM COATED ORAL 2 TIMES DAILY
Qty: 90 TABLET | Refills: 3 | Status: SHIPPED | OUTPATIENT
Start: 2024-10-29

## 2024-10-29 NOTE — PROGRESS NOTES
Subjective   Álvaro Jacques is a 74 y.o. male.     Fatigue  Associated symptoms include fatigue. Pertinent negatives include no abdominal pain, arthralgias, chest pain, myalgias, rash, sore throat or weakness.   Hypertension  Pertinent negatives include no blurred vision, chest pain or shortness of breath.   Leg Swelling  Associated symptoms include fatigue. Pertinent negatives include no abdominal pain, arthralgias, chest pain, myalgias, rash, sore throat or weakness.   Urinary Frequency   Associated symptoms include frequency. Pertinent negatives include no hematuria.      F/u htn.  Doing well with meds.    C/o urinary frequency.   I get up about 4-6 times at night.  I go 10-12 times through the day.      The following portions of the patient's history were reviewed and updated as appropriate: allergies, current medications, past family history, past medical history, past social history, past surgical history, and problem list.    Review of Systems   Constitutional:  Positive for fatigue. Negative for appetite change.   HENT:  Negative for nosebleeds and sore throat.    Eyes:  Negative for blurred vision and visual disturbance.   Respiratory:  Negative for shortness of breath and wheezing.    Cardiovascular:  Positive for leg swelling. Negative for chest pain.   Gastrointestinal:  Negative for abdominal distention and abdominal pain.   Endocrine: Negative for cold intolerance and polyuria.   Genitourinary:  Positive for frequency. Negative for dysuria and hematuria.   Musculoskeletal:  Negative for arthralgias and myalgias.   Skin:  Negative for color change and rash.   Neurological:  Negative for weakness and confusion.   Psychiatric/Behavioral:  Negative for agitation and depressed mood.        Objective   Physical Exam  Vitals reviewed.   Constitutional:       Appearance: He is well-developed. He is not diaphoretic.   HENT:      Head: Normocephalic and atraumatic.   Eyes:      General: No scleral icterus.      Pupils: Pupils are equal, round, and reactive to light.   Neck:      Thyroid: No thyromegaly.   Cardiovascular:      Rate and Rhythm: Normal rate and regular rhythm.      Heart sounds: No murmur heard.     No friction rub. No gallop.   Pulmonary:      Effort: Pulmonary effort is normal. No respiratory distress.      Breath sounds: No wheezing or rales.   Chest:      Chest wall: No tenderness.   Abdominal:      General: Bowel sounds are normal. There is no distension.      Palpations: Abdomen is soft.      Tenderness: There is no abdominal tenderness.   Musculoskeletal:         General: No deformity. Normal range of motion.   Lymphadenopathy:      Cervical: No cervical adenopathy.   Skin:     General: Skin is warm and dry.      Findings: No rash.      Comments: 2 plus edema distal L leg.   1 plus Distal R leg.     Neurological:      Cranial Nerves: No cranial nerve deficit.      Motor: No abnormal muscle tone.           Assessment & Plan   Diagnoses and all orders for this visit:    1. Lymphedema (Primary)  -     Ambulatory Referral to Lymphedema Clinic  -     Comprehensive Metabolic Panel    2. Essential hypertension  -     spironolactone (ALDACTONE) 50 MG tablet; Take 1 tablet by mouth 2 (Two) Times a Day.  Dispense: 90 tablet; Refill: 3    3. Localized edema  -     spironolactone (ALDACTONE) 50 MG tablet; Take 1 tablet by mouth 2 (Two) Times a Day.  Dispense: 90 tablet; Refill: 3  -     Comprehensive Metabolic Panel    4. Hyperglycemia  -     Hemoglobin A1c    Other orders  -     tamsulosin (FLOMAX) 0.4 MG capsule 24 hr capsule; Take 1 capsule by mouth Daily.  Dispense: 90 capsule; Refill: 3    Lymphedema.  Uncontrolled.  To lymphedema clinic.   Needs pneumatic compression.  Change spironolactone to 50 a day.    BPH.  Uncontrolled.  Start flomax.    Hyperglycemia.  Check A1c.    HTN.  Controlled.  Check CMP.

## 2024-10-30 LAB
ALBUMIN SERPL-MCNC: 4.1 G/DL (ref 3.5–5.2)
ALBUMIN/GLOB SERPL: 1.7 G/DL
ALP SERPL-CCNC: 58 U/L (ref 39–117)
ALT SERPL-CCNC: 24 U/L (ref 1–41)
AST SERPL-CCNC: 25 U/L (ref 1–40)
BILIRUB SERPL-MCNC: 1.9 MG/DL (ref 0–1.2)
BUN SERPL-MCNC: 9 MG/DL (ref 8–23)
BUN/CREAT SERPL: 11.7 (ref 7–25)
CALCIUM SERPL-MCNC: 9.2 MG/DL (ref 8.6–10.5)
CHLORIDE SERPL-SCNC: 100 MMOL/L (ref 98–107)
CO2 SERPL-SCNC: 25.8 MMOL/L (ref 22–29)
CREAT SERPL-MCNC: 0.77 MG/DL (ref 0.76–1.27)
EGFRCR SERPLBLD CKD-EPI 2021: 95.1 ML/MIN/1.73
GLOBULIN SER CALC-MCNC: 2.4 GM/DL
GLUCOSE SERPL-MCNC: 110 MG/DL (ref 65–99)
HBA1C MFR BLD: 4.5 % (ref 4.8–5.6)
POTASSIUM SERPL-SCNC: 4.5 MMOL/L (ref 3.5–5.2)
PROT SERPL-MCNC: 6.5 G/DL (ref 6–8.5)
SODIUM SERPL-SCNC: 136 MMOL/L (ref 136–145)

## 2024-11-04 ENCOUNTER — HOSPITAL ENCOUNTER (OUTPATIENT)
Dept: PHYSICAL THERAPY | Facility: HOSPITAL | Age: 74
Setting detail: THERAPIES SERIES
Discharge: HOME | End: 2024-11-04
Payer: MEDICARE

## 2024-11-04 DIAGNOSIS — I89.0 LYMPHEDEMA: Primary | ICD-10-CM

## 2024-11-04 PROCEDURE — 97164 PT RE-EVAL EST PLAN CARE: CPT

## 2024-11-04 PROCEDURE — 97535 SELF CARE MNGMENT TRAINING: CPT

## 2024-11-04 NOTE — THERAPY RE-EVALUATION
Physical Therapy Lymphedema Re-Evaluation  [facility]     Patient Name: Álvaro Jacques  : 1950  MRN: [id number]  Today's Date: 2024      Visit Date: 2024    Visit Dx:    ICD-10-CM ICD-9-CM   1. Lymphedema  I89.0 457.1       Patient Active Problem List   Diagnosis    Seasonal allergic rhinitis due to pollen    Antral gastritis    Coronel's esophagus without dysplasia    Recurrent major depressive disorder, in partial remission    Elevated liver function tests    Other emphysema    Essential hypertension    Chronic gout of multiple sites    Osteoarthritis of both knees    Primary insomnia    Benign prostatic hyperplasia with urinary frequency    Immunization deficiency    Non-intractable vomiting with nausea    Medicare annual wellness visit, subsequent    Hyperamylasemia    ELEAZAR (generalized anxiety disorder)    Bronchitis    Chronic cough    Colon cancer screening    Hyperglycemia    Clinical diagnosis of COVID-19    Localized edema    Venous insufficiency    Preoperative clearance    Obesity (BMI 30-39.9)    Encounter for hepatitis C screening test for low risk patient    Hyperhidrosis    Personal history of colonic polyps    Lymphedema    Influenza    Chronic fatigue        Past Medical History:   Diagnosis Date    Allergic     Allergic rhinitis     Antral gastritis     Coronel's esophagus     Calcaneal spur     COVID-19     2021; had infusion    Depression with anxiety     Elevated liver function tests     Emphysema lung     Enlarged aorta     no treatment needed per patient    Erectile dysfunction     GERD (gastroesophageal reflux disease)     Gout     Hypertension     Inability to attain erection     Influenza     Knee osteoarthritis     Non-intractable vomiting with nausea 2019    Osteoarthritis     Osteoporosis     Primary insomnia     Urinary frequency         Past Surgical History:   Procedure Laterality Date    COLONOSCOPY      COLONOSCOPY W/ POLYPECTOMY N/A  12/17/2020    Procedure: COLONOSCOPY TO CECUM WITH COLD AND HOT SNARE POLYPECTOMIES AND COLD BIOPSY POLYPECTOMY;  Surgeon: Vincent Man MD;  Location:  [facility];  Service: Gastroenterology;  Laterality: N/A;  PRE: SCREENING  POST: POLYPS, DIVERTICULOSIS    COLONOSCOPY W/ POLYPECTOMY N/A 9/1/2023    Procedure: COLONOSCOPY WITH POLYPECTOMY;  Surgeon: Vincent Man MD;  Location: [facility] MA[facility];  Service: Gastroenterology;  Laterality: N/A;  polyps, diverticulosis    ENDOSCOPY N/A 12/17/2020    Procedure: ESOPHAGOGASTRODUODENOSCOPY WITH COLD BIOPSIES;  Surgeon: Vincent Man MD;  Location:  [facility];  Service: Gastroenterology;  Laterality: N/A;  PRE: HX BARRETTS ESOPHAGUS  POST: BARRETTS ESOPHAGUS, GASTRIC POLYPS, HIATAL HERNIA, GASTRITIS, DUODENAL LYPOMA    ENDOSCOPY N/A 4/15/2021    Procedure: ESOPHAGOGASTRODUODENOSCOPY WITH BIOPSIES;  Surgeon: Vincent Man MD;  Location:  [facility];  Service: Gastroenterology;  Laterality: N/A;  PRE OP - H/O SALDANA'S  POST OP -    ENDOSCOPY N/A 9/1/2023    Procedure: ESOPHAGOGASTRODUODENOSCOPY WITH BIOPSY;  Surgeon: Vincent Man MD;  Location: [facility];  Service: Gastroenterology;  Laterality: N/A;  Gastric polyps, Barretts Esophagus    EYE SURGERY      CATARACTS    HERNIA REPAIR      KNEE ARTHROSCOPY      KNEE CARTILAGE SURGERY Right 11/08/2021       Visit Dx:    ICD-10-CM ICD-9-CM   1. Lymphedema  I89.0 457.1            Lymphedema       Row Name 11/04/24 0900             Subjective Pain    Able to rate subjective pain? yes  -KA      Pre-Treatment Pain Level 4  -KA      Post-Treatment Pain Level 4  -KA      Subjective Pain Comment LLE  -KA         Subjective    Subjective Comments No significant medical changes.  Pt has been compliant with daily use of compression, elevation, and exercise since last seen in March 2024.  Over past 2 months, has noted gradual increase in swelling LLE, beginning to see increase  in swelling RLE.  Unable to have L TKA due to increased swelling, concerns about lymphedema not being well controlled.  -KA         Lymphedema Assessment    Lymphedema Classification RLE:;LLE:;secondary;stage 2 (Spontaneously Irreversible)  -KA      Lymphedema Assessment Comments Pt with mild lymphedema RLE, moderate lympehdema LLE, affection foot to knees  -KA         Posture/Observations    Posture/Observations Comments Pt ambulating with decreased gait speed, antalgic gait pattern, no AD; LLE visibly swollen  -KA         General ROM    GENERAL ROM COMMENTS PT able to reach feet and ankles with difficulty, mild limitations to ankle ROM related to swelling  -KA         MMT (Manual Muscle Testing)    General MMT Comments BLE strength at least 3/5  -KA         Lymphedema Edema Assessment    Edema Assessment Comment LLE more edematous, RLE with minimal edema, near normal contours except for ankles  -KA         Skin Changes/Observations    Skin Observations Comment No skin concerns at this time  -KA         Lymphedema Measurements    Measurement Type(s) Circumferential  -KA      Circumferential Areas Lower extremities  -KA         BLE Circumferential (cm)    Measurement Location 1 Knee  -KA      Left 1 41 cm  -KA      Right 1 40.9 cm  -KA      Measurement Location 2 10 cm below knee  -KA      Left 2 44 cm  -KA      Right 2 43.4 cm  -KA      Measurement Location 3 20 cm below knee  -KA      Left 3 38.3 cm  -KA      Right 3 36 cm  -KA      Measurement Location 4 30 cm below knee  -KA      Left 4 33 cm  -KA      Right 4 30.3 cm  -KA      Measurement Location 5 Ankle  -KA      Left 5 30.2 cm  -KA      Right 5 29.5 cm  -KA      Measurement Location 6 Midfoot  -KA      Left 6 25.8 cm  -KA      Right 6 25.4 cm  -KA      LLE Circumferential Total 212.3 cm  -KA      RLE Circumferential Total 205.5 cm  -KA         Manual Lymphatic Drainage    Manual Lymphatic Drainage Comments Brief MLD to LLE  -KA                User Key  (r) =  Recorded By, (t) = Taken By, (c) = Cosigned By      Initials Name Provider Type    Ruth Ann Shea, LUIS Physical Therapist                                    Therapy Education  Education Details: Discussed girth measurements.  It has been around 6 months since garments were replaced, so do think this could be partially contributing to increased swelling. Could also consider wearing Juxalite HD overnight.  Will order new garments (Juxtalite HD size Large long x 2 for each leg) through [employer], also recommending lymphedema pump at this point since he has not been able to fully manage edema with HEP compliance, will place request through [facility].  Recommending he return for skilled physical therapy for at least one week of compression bandaging to help get past his flare up prior to returning to inelastic garments.  Discussed options for custom compression or nighttime garments, pt would prefer to try pump and new Juxalite HD first. Begin performing MLD at home.  Given: Edema management, Bandaging/dressing change  Program: New  How Provided: Verbal  Provided to: Patient  Level of Understanding: Verbalized  54917 - PT Self Care/Mgmt Minutes: 25       OP Exercises       Row Name 11/04/24 0900             Subjective    Subjective Comments No significant medical changes.  Pt has been compliant with daily use of compression, elevation, and exercise since last seen in March 2024.  Over past 2 months, has noted gradual increase in swelling LLE, beginning to see increase in swelling RLE.  Unable to have L TKA due to increased swelling, concerns about lymphedema not being well controlled.  -KA         Subjective Pain    Able to rate subjective pain? yes  -KA      Pre-Treatment Pain Level 4  -KA      Post-Treatment Pain Level 4  -KA      Subjective Pain Comment LLE  -KA                User Key  (r) = Recorded By, (t) = Taken By, (c) = Cosigned By      Initials Name Provider Type    Ruth Ann Shea, LUIS Physical  Therapist                                 PT OP Goals       Row Name 11/04/24 1700          PT Short Term Goals    STG Date to Achieve 11/18/24  -KA     STG 1 Patient demonstrates decreased net edema of Left Lower Extremity>/= 5-10 cm for decreased edema symptoms, decreased risk of infection, and improved skin care.  -KA     STG 1 Progress New  -KA     STG 2 Pt will demonstrate compliance with self MLD to improve lymphatic flow.  -KA     STG 2 Progress New  -KA        Long Term Goals    LTG Date to Achieve 02/02/25  -KA     LTG 1 Pt will obtain new compression garments every 6 months for optimal management of lymphedema.  -KA     LTG 1 Progress New  -KA     LTG 2 Pt will be compliant with use of long term compression garments daily.  -KA     LTG 2 Progress New  -KA     LTG 3 Pt will use lymphedema pump 1x/daily at least 5 days per week.  -KA     LTG 3 Progress New  -KA        Time Calculation    PT Goal Re-Cert Due Date 02/02/25  -EUN               User Key  (r) = Recorded By, (t) = Taken By, (c) = Cosigned By      Initials Name Provider Type    Ruth Ann Shea, PT Physical Therapist                     PT Assessment/Plan       Row Name 11/04/24 1726          PT Assessment    Functional Limitations Impaired gait;Limitation in home management;Limitations in community activities;Limitations in functional capacity and performance;Performance in leisure activities;Performance in self-care ADL  -KA     Impairments Edema;Balance;Endurance;Gait;Impaired lymphatic circulation;Integumentary integrity;Muscle strength;Pain;Range of motion;Sensation  -EUN     Assessment Comments Pt presents to lymphedema clinic today for re-evaluation, last seen in March 2024 when he transitioned to long term compression garments following completion of Phase 1 CDT.  Since that time, he has been compliant with daily use of compression garments, elevation of legs, and exercises for legs.  However, he has been having progression of symptoms,  states that by the time he finishes showering 30 minutes after waking and is ready to put compression garments on, legs have already begun to swell to the point garments are uncomfortable.  Girth measurements fairly stable RLE, but significantly increased LLE today compared to end of treatment last episode of care, no signs of active infection, no insult to cause increased swelling.  I do think he needs from some brief CDT treatment including MLD and compression bandaging to get lymphedema back under control, will need to order new Juxtalite HD for BLE as his are wearing out.  Do recommend lymphedema pump for improved management of condition and to prevent further progression; he has been compliant with HEP including daily compression, elevation, and exercise for the past 6 months.  Pt agreeable to this plan.  -EUN     Rehab Potential Good  -EUN     Patient/caregiver participated in establishment of treatment plan and goals Yes  -EUN     Patient would benefit from skilled therapy intervention Yes  -KA        PT Plan    PT Frequency Other (comment);1x/week;2x/week;3x/week  -EUN     Predicted Duration of Therapy Intervention (PT) 4-6 visits over next month  -EUN     Planned CPT's? PT RE-EVAL: 95864;PT THER ACT EA 15 MIN: 89929;PT THER PROC EA 15 MIN: 93177;PT MANUAL THERAPY EA 15 MIN: 50768;PT NEUROMUSC RE-EDUCATION EA 15 MIN: 49883;PT GAIT TRAINING EA 15 MIN: 83709;PT SELF CARE/HOME MGMT/TRAIN EA 15: 19072  -EUN     PT Plan Comments 1 week CDT 3x/week, reorder compression garments, order compression pump.  -EUN               User Key  (r) = Recorded By, (t) = Taken By, (c) = Cosigned By      Initials Name Provider Type    Ruth Ann Shea, PT Physical Therapist                                 Time Calculation:   Start Time: 0930  Stop Time: 1010  Time Calculation (min): 40 min  Timed Charges  16439 - PT Self Care/Mgmt Minutes: 25  Untimed Charges  PT Eval/Re-eval Minutes: 15  Total Minutes  Timed Charges Total Minutes:  25  Untimed Charges Total Minutes: 15   Total Minutes: 15   Therapy Charges for Today       Code Description Service Date Service Provider Modifiers Qty    10724422101 HC PT SELF CARE/MGMT/TRAIN EA 15 MIN 11/4/2024 Ruth Ann Dasilva, PT GP 2    01612737791 HC PT RE-EVAL ESTABLISHED PLAN 2 11/4/2024 Ruth Ann Dasilva, PT GP 1                      Ruth Ann Dasilva, PT  11/4/2024

## 2024-11-11 ENCOUNTER — HOSPITAL ENCOUNTER (OUTPATIENT)
Dept: PHYSICAL THERAPY | Facility: HOSPITAL | Age: 74
Setting detail: THERAPIES SERIES
Discharge: HOME | End: 2024-11-11
Payer: MEDICARE

## 2024-11-11 DIAGNOSIS — I89.0 LYMPHEDEMA: Primary | ICD-10-CM

## 2024-11-11 PROCEDURE — 97140 MANUAL THERAPY 1/> REGIONS: CPT

## 2024-11-11 NOTE — THERAPY TREATMENT NOTE
Outpatient Physical Therapy Lymphedema Treatment Note  [facility]     Patient Name: Álvaro Jacques  : 1950  MRN: [id number]  Today's Date: 2024        Visit Date: 2024    Visit Dx:    ICD-10-CM ICD-9-CM   1. Lymphedema  I89.0 457.1       Patient Active Problem List   Diagnosis    Seasonal allergic rhinitis due to pollen    Antral gastritis    Coronel's esophagus without dysplasia    Recurrent major depressive disorder, in partial remission    Elevated liver function tests    Other emphysema    Essential hypertension    Chronic gout of multiple sites    Osteoarthritis of both knees    Primary insomnia    Benign prostatic hyperplasia with urinary frequency    Immunization deficiency    Non-intractable vomiting with nausea    Medicare annual wellness visit, subsequent    Hyperamylasemia    ELEAZAR (generalized anxiety disorder)    Bronchitis    Chronic cough    Colon cancer screening    Hyperglycemia    Clinical diagnosis of COVID-19    Localized edema    Venous insufficiency    Preoperative clearance    Obesity (BMI 30-39.9)    Encounter for hepatitis C screening test for low risk patient    Hyperhidrosis    Personal history of colonic polyps    Lymphedema    Influenza    Chronic fatigue         Lymphedema       Row Name 24 0900             Subjective Pain    Able to rate subjective pain? yes  -KA      Pre-Treatment Pain Level 0  -KA      Post-Treatment Pain Level 0  -KA         Subjective    Subjective Comments No significant changes since last week, brought bandaging supplies to clinic.  -KA         Manual Lymphatic Drainage    Manual Lymphatic Drainage Comments Short neck, B axilla, B IA, diaphragmatic breathing, B inguinals, BLE  -KA         Compression/Skin Care    Compression/Skin Care skin care;wrapping location;bandaging  -KA      Skin Care moisturizing lotion applied  -KA      Wrapping Location lower extremity  -KA      Wrapping Location LE bilateral:;foot to knee  -KA      Bandage  Layers cotton liner;padding/fluff layer;short-stretch bandages (comment size/quantity)  -      Bandaging Comments TG9, Artiflex x 1, Komprex kidneys just posterior to ankle malleoli, Rosidal soft ankle to knee, Rosidal K 1-8 cm, 1-10 cm, 1-12 cm, pt's shoes  -      Bandaging Technique moderate compression;figure-eight  -KA                User Key  (r) = Recorded By, (t) = Taken By, (c) = Cosigned By      Initials Name Provider Type    Ruth Ann Shea, PT Physical Therapist                                   PT Assessment/Plan       Row Name 11/11/24 0918          PT Assessment    Assessment Comments Began MLD to BLE today, good response from patient.  Also started compression bandaging with 3 SS bandages per leg, figure 8 technique, moderate compression foot to knee.  -        PT Plan    PT Plan Comments Assess response to bandaging, modify as needed.  -               User Key  (r) = Recorded By, (t) = Taken By, (c) = Cosigned By      Initials Name Provider Type    Ruth Ann Shea, PT Physical Therapist                        OP Exercises       Row Name 11/11/24 0919 11/11/24 0900          Subjective    Subjective Comments -- No significant changes since last week, brought bandaging supplies to clinic.  -EUN        Subjective Pain    Able to rate subjective pain? -- yes  -KA     Pre-Treatment Pain Level -- 0  -KA     Post-Treatment Pain Level -- 0  -KA        Total Minutes    94913 - PT Manual Therapy Minutes 56  -KA --               User Key  (r) = Recorded By, (t) = Taken By, (c) = Cosigned By      Initials Name Provider Type    Ruth Ann Shea, PT Physical Therapist                            Manual Rx (Last 36 Hours)       Manual Treatments       Row Name 11/11/24 0919             Total Minutes    08389 - PT Manual Therapy Minutes 56  -KA                User Key  (r) = Recorded By, (t) = Taken By, (c) = Cosigned By      Initials Name Provider Type    Ruth Ann Shea, LUIS Physical  Therapist                                       Time Calculation:   Start Time: 0820  Stop Time: 0916  Time Calculation (min): 56 min  Timed Charges  54011 - PT Manual Therapy Minutes: 56  Total Minutes  Timed Charges Total Minutes: 56   Total Minutes: 56   Therapy Charges for Today       Code Description Service Date Service Provider Modifiers Qty    31832791603 HC PT MANUAL THERAPY EA 15 MIN 11/11/2024 Ruth Ann Dasilva, PT GP 4                      Ruth Ann Dasilva, PT  11/11/2024

## 2024-11-13 ENCOUNTER — HOSPITAL ENCOUNTER (OUTPATIENT)
Dept: PHYSICAL THERAPY | Facility: HOSPITAL | Age: 74
Setting detail: THERAPIES SERIES
Discharge: HOME | End: 2024-11-13
Payer: MEDICARE

## 2024-11-13 DIAGNOSIS — I89.0 LYMPHEDEMA: Primary | ICD-10-CM

## 2024-11-13 PROCEDURE — 97140 MANUAL THERAPY 1/> REGIONS: CPT

## 2024-11-13 NOTE — THERAPY TREATMENT NOTE
Outpatient Physical Therapy Lymphedema Treatment Note  [facility]     Patient Name: Álvaro Jacques  : 1950  MRN: [id number]  Today's Date: 2024        Visit Date: 2024    Visit Dx:    ICD-10-CM ICD-9-CM   1. Lymphedema  I89.0 457.1       Patient Active Problem List   Diagnosis    Seasonal allergic rhinitis due to pollen    Antral gastritis    Coronel's esophagus without dysplasia    Recurrent major depressive disorder, in partial remission    Elevated liver function tests    Other emphysema    Essential hypertension    Chronic gout of multiple sites    Osteoarthritis of both knees    Primary insomnia    Benign prostatic hyperplasia with urinary frequency    Immunization deficiency    Non-intractable vomiting with nausea    Medicare annual wellness visit, subsequent    Hyperamylasemia    ELEAZAR (generalized anxiety disorder)    Bronchitis    Chronic cough    Colon cancer screening    Hyperglycemia    Clinical diagnosis of COVID-19    Localized edema    Venous insufficiency    Preoperative clearance    Obesity (BMI 30-39.9)    Encounter for hepatitis C screening test for low risk patient    Hyperhidrosis    Personal history of colonic polyps    Lymphedema    Influenza    Chronic fatigue         Lymphedema       Row Name 24 0800             Subjective Pain    Able to rate subjective pain? yes  -KA      Pre-Treatment Pain Level 0  -KA      Post-Treatment Pain Level 0  -KA         Subjective    Subjective Comments Responded well to bandaging, can tell legs are smaller and more normally shaped.  -KA         Manual Lymphatic Drainage    Manual Lymphatic Drainage Comments Short neck, B axilla, B IA, diaphragmatic breathing, B inguinals, BLE  -KA         Compression/Skin Care    Compression/Skin Care skin care;wrapping location;bandaging  -KA      Skin Care moisturizing lotion applied  -KA      Wrapping Location lower extremity  -KA      Wrapping Location LE bilateral:;foot to knee  -KA       Bandage Layers cotton liner;padding/fluff layer;short-stretch bandages (comment size/quantity)  -      Bandaging Comments TG9, Artiflex x 1, Komprex kidneys just posterior to ankle malleoli, Rosidal soft ankle to knee, Rosidal K 1-8 cm, 1-10 cm, 1-12 cm, pt's shoes  -      Bandaging Technique moderate compression;figure-eight  -                User Key  (r) = Recorded By, (t) = Taken By, (c) = Cosigned By      Initials Name Provider Type    Ruth Ann Shea, PT Physical Therapist                                   PT Assessment/Plan       Row Name 11/13/24 0850          PT Assessment    Assessment Comments Pt responded well to bandaging, no issues, legs visibly reduced.  Continued with MLD BLE with good response from patient, continued with compression bandaging foot to knee with no changes in technique from last visit.  -     Rehab Potential Good  -KA     Patient/caregiver participated in establishment of treatment plan and goals Yes  -KA     Patient would benefit from skilled therapy intervention Yes  -KA        PT Plan    PT Plan Comments Continue CDT.  -               User Key  (r) = Recorded By, (t) = Taken By, (c) = Cosigned By      Initials Name Provider Type    Ruth Ann Shea, PT Physical Therapist                        OP Exercises       Row Name 11/13/24 0851 11/13/24 0800          Subjective    Subjective Comments -- Responded well to bandaging, can tell legs are smaller and more normally shaped.  -        Subjective Pain    Able to rate subjective pain? -- yes  -KA     Pre-Treatment Pain Level -- 0  -KA     Post-Treatment Pain Level -- 0  -KA        Total Minutes    29892 - PT Manual Therapy Minutes 59  -KA --               User Key  (r) = Recorded By, (t) = Taken By, (c) = Cosigned By      Initials Name Provider Type    Ruth Ann Shea, PT Physical Therapist                            Manual Rx (Last 36 Hours)       Manual Treatments       Row Name 11/13/24 0851              Total Minutes    11063 - PT Manual Therapy Minutes 59  -KA                User Key  (r) = Recorded By, (t) = Taken By, (c) = Cosigned By      Initials Name Provider Type    Ruth Ann Shea, LUIS Physical Therapist                                       Time Calculation:   Start Time: 0815  Stop Time: 0914  Time Calculation (min): 59 min  Timed Charges  48425 - PT Manual Therapy Minutes: 59  Total Minutes  Timed Charges Total Minutes: 59   Total Minutes: 59   Therapy Charges for Today       Code Description Service Date Service Provider Modifiers Qty    [id number]  PT MANUAL THERAPY EA 15 MIN 11/13/2024 Ruth Ann Dasilva, PT GP, KX 4                      Ruth Ann Dasilva PT  11/13/2024

## 2024-11-15 ENCOUNTER — HOSPITAL ENCOUNTER (OUTPATIENT)
Dept: PHYSICAL THERAPY | Facility: HOSPITAL | Age: 74
Setting detail: THERAPIES SERIES
Discharge: HOME | End: 2024-11-15
Payer: MEDICARE

## 2024-11-15 DIAGNOSIS — I89.0 LYMPHEDEMA: Primary | ICD-10-CM

## 2024-11-15 PROCEDURE — 97140 MANUAL THERAPY 1/> REGIONS: CPT

## 2024-11-15 NOTE — THERAPY PROGRESS REPORT/RE-CERT
Outpatient Physical Therapy Lymphedema Progress Note  [facility]     Patient Name: Álvaro Jacques  : 1950  MRN: [id number]  Today's Date: 11/15/2024        Visit Date: 11/15/2024    Visit Dx:    ICD-10-CM ICD-9-CM   1. Lymphedema  I89.0 457.1       Patient Active Problem List   Diagnosis    Seasonal allergic rhinitis due to pollen    Antral gastritis    Coronel's esophagus without dysplasia    Recurrent major depressive disorder, in partial remission    Elevated liver function tests    Other emphysema    Essential hypertension    Chronic gout of multiple sites    Osteoarthritis of both knees    Primary insomnia    Benign prostatic hyperplasia with urinary frequency    Immunization deficiency    Non-intractable vomiting with nausea    Medicare annual wellness visit, subsequent    Hyperamylasemia    ELEAZAR (generalized anxiety disorder)    Bronchitis    Chronic cough    Colon cancer screening    Hyperglycemia    Clinical diagnosis of COVID-19    Localized edema    Venous insufficiency    Preoperative clearance    Obesity (BMI 30-39.9)    Encounter for hepatitis C screening test for low risk patient    Hyperhidrosis    Personal history of colonic polyps    Lymphedema    Influenza    Chronic fatigue         Lymphedema       Row Name 11/15/24 0800             Subjective Pain    Able to rate subjective pain? yes  -KA      Pre-Treatment Pain Level 0  -KA      Post-Treatment Pain Level 0  -KA         Subjective    Subjective Comments Pt received 1 set of Juxtalite HD from [employer] in the mail.  -KA         Lymphedema Assessment    Lymphedema Classification RLE:;LLE:;secondary;stage 2 (Spontaneously Irreversible)  -KA      Lymphedema Assessment Comments Pt with mild lymphedema RLE, moderate lympehdema LLE, affection foot to knees  -KA         Lymphedema Measurements    Measurement Type(s) Circumferential  -KA      Circumferential Areas Lower extremities  -KA         BLE Circumferential (cm)    Measurement Location 1  Knee  -KA      Left 1 41.9 cm  -KA      Right 1 41.1 cm  -KA      Measurement Location 2 10 cm below knee  -KA      Left 2 42.4 cm  -KA      Right 2 44.5 cm  -KA      Measurement Location 3 20 cm below knee  -KA      Left 3 34.4 cm  -KA      Right 3 35.3 cm  -KA      Measurement Location 4 30 cm below knee  -KA      Left 4 28.8 cm  -KA      Right 4 28.6 cm  -KA      Measurement Location 5 Ankle  -KA      Left 5 29.8 cm  -KA      Right 5 29.4 cm  -KA      Measurement Location 6 Midfoot  -KA      Left 6 25.7 cm  -KA      Right 6 25.9 cm  -KA      LLE Circumferential Total 203 cm  -KA      RLE Circumferential Total 204.8 cm  -KA         Manual Lymphatic Drainage    Manual Lymphatic Drainage Comments Short neck, B axilla, B IA, diaphragmatic breathing, B inguinals, BLE  -KA         Compression/Skin Care    Compression/Skin Care compression garment  -KA      Compression Garment Comments Applied Juxtalite HD Large Long to BLE, 30-40 mmHg  -KA                User Key  (r) = Recorded By, (t) = Taken By, (c) = Cosigned By      Initials Name Provider Type    Ruth Ann Shea, PT Physical Therapist                                   PT Assessment/Plan       Row Name 11/15/24 0848          PT Assessment    Functional Limitations Impaired gait;Limitation in home management;Limitations in community activities;Limitations in functional capacity and performance;Performance in leisure activities;Performance in self-care ADL  -KA     Impairments Edema;Balance;Endurance;Gait;Impaired lymphatic circulation;Integumentary integrity;Muscle strength;Pain;Range of motion;Sensation  -KA     Assessment Comments Pt has responded well to CDT.  In one week of treatment, he has seen net decreased edema of 9.3 cm in LLE, minimal reduction in RLE.  He continues to have pocket of stubborn edema at L ankle despite inelastic garments, performing MLD to this area and has lymphedema pump ordered which should help.  Received first pair of new  inelastic garments from [facility], began using them today and they fit properly, will give proper containment.  Continued MLD today for BLE, extra time on ankles.  Pt remains appropriate for skilled physical therapy to reduce edema and improve self management of condition, but since he is near baseline swelling from discharge, will let him practice self management for now since he is pretty well reduced and has new garments, will reassess after he receives pump and begins using regularly to determine if he does need custom nighttime or daytime garments.  -KA     Rehab Potential Good  -     Patient/caregiver participated in establishment of treatment plan and goals Yes  -KA     Patient would benefit from skilled therapy intervention Yes  -KA        PT Plan    Predicted Duration of Therapy Intervention (PT) 4-6 visits over one month  -KA     Planned CPT's? PT RE-EVAL: 63369;PT THER PROC EA 15 MIN: 48860;PT THER ACT EA 15 MIN: 03512;PT MANUAL THERAPY EA 15 MIN: 76590;PT NEUROMUSC RE-EDUCATION EA 15 MIN: 35751;PT GAIT TRAINING EA 15 MIN: 54285;PT SELF CARE/HOME MGMT/TRAIN EA 15: 55151  -     PT Plan Comments Transition to self management, return for reassessment after he receives pump and has been using for at least a couple of weeks.  -EUN               User Key  (r) = Recorded By, (t) = Taken By, (c) = Cosigned By      Initials Name Provider Type    Ruth Ann Shea, PT Physical Therapist                        OP Exercises       Row Name 11/15/24 1498 11/15/24 0800          Subjective    Subjective Comments -- Pt received 1 set of Juxtalite HD from [employer] in the mail.  -EUN        Subjective Pain    Able to rate subjective pain? -- yes  -KA     Pre-Treatment Pain Level -- 0  -KA     Post-Treatment Pain Level -- 0  -KA        Total Minutes    38032 - PT Manual Therapy Minutes 55  -KA --               User Key  (r) = Recorded By, (t) = Taken By, (c) = Cosigned By      Initials Name Provider Type    EUN Dasilva  Ruth Ann, PT Physical Therapist                            Manual Rx (Last 36 Hours)       Manual Treatments       Row Name 11/15/24 1158             Total Minutes    76224 - PT Manual Therapy Minutes 55  -EUN                User Key  (r) = Recorded By, (t) = Taken By, (c) = Cosigned By      Initials Name Provider Type    Ruth Ann Shea, PT Physical Therapist                     PT OP Goals       Row Name 11/15/24 1100          PT Short Term Goals    STG Date to Achieve 11/18/24  -     STG 1 Patient demonstrates decreased net edema of Left Lower Extremity>/= 5-10 cm for decreased edema symptoms, decreased risk of infection, and improved skin care.  -KA     STG 1 Progress Met  -KA     STG 1 Progress Comments Decreased net edema 9.3 cm  -     STG 2 Pt will demonstrate compliance with self MLD to improve lymphatic flow.  -     STG 2 Progress Met  -     STG 2 Progress Comments Pt performing self MLD when he sees swelling and is out of garments  -        Long Term Goals    LTG Date to Achieve 02/02/25  -     LTG 1 Pt will obtain new compression garments every 6 months for optimal management of lymphedema.  -     LTG 1 Progress Ongoing  -KA     LTG 1 Progress Comments Did receive Juxtalite HD and came in wearing them  -     LTG 2 Pt will be compliant with use of long term compression garments daily.  -     LTG 2 Progress Ongoing  -     LTG 3 Pt will use lymphedema pump 1x/daily at least 5 days per week.  -     LTG 3 Progress New  -     LTG 3 Progress Comments Has not received  -EUN               User Key  (r) = Recorded By, (t) = Taken By, (c) = Cosigned By      Initials Name Provider Type    Ruth Ann Shea, PT Physical Therapist                                   Time Calculation:   Start Time: 0820  Stop Time: 0920  Time Calculation (min): 60 min  Timed Charges  31429 - PT Manual Therapy Minutes: 55  Total Minutes  Timed Charges Total Minutes: 55   Total Minutes: 55   Therapy Charges for  Today       Code Description Service Date Service Provider Modifiers Qty    [id number] HC PT MANUAL THERAPY EA 15 MIN 11/15/2024 Ruth Ann Dasilva, PT GP, KX 4                      Ruth Ann Dasilva, PT  11/15/2024

## 2024-11-30 DIAGNOSIS — K22.70 BARRETT'S ESOPHAGUS WITHOUT DYSPLASIA: ICD-10-CM

## 2024-12-01 RX ORDER — PANTOPRAZOLE SODIUM 40 MG/1
40 TABLET, DELAYED RELEASE ORAL 2 TIMES DAILY
Qty: 180 TABLET | Refills: 3 | Status: SHIPPED | OUTPATIENT
Start: 2024-12-01

## 2024-12-10 DIAGNOSIS — R05.3 CHRONIC COUGH: ICD-10-CM

## 2024-12-10 DIAGNOSIS — J30.1 SEASONAL ALLERGIC RHINITIS DUE TO POLLEN: ICD-10-CM

## 2024-12-10 RX ORDER — MONTELUKAST SODIUM 10 MG/1
10 TABLET ORAL NIGHTLY
Qty: 90 TABLET | Refills: 3 | Status: SHIPPED | OUTPATIENT
Start: 2024-12-10

## 2025-02-10 DIAGNOSIS — F41.1 GAD (GENERALIZED ANXIETY DISORDER): ICD-10-CM

## 2025-02-10 RX ORDER — ESCITALOPRAM OXALATE 10 MG/1
10 TABLET ORAL DAILY
Qty: 90 TABLET | Refills: 2 | Status: SHIPPED | OUTPATIENT
Start: 2025-02-10

## 2025-05-06 ENCOUNTER — OFFICE VISIT (OUTPATIENT)
Dept: FAMILY MEDICINE CLINIC | Facility: CLINIC | Age: 75
End: 2025-05-06
Payer: MEDICARE

## 2025-05-06 VITALS
RESPIRATION RATE: 17 BRPM | SYSTOLIC BLOOD PRESSURE: 128 MMHG | TEMPERATURE: 97 F | DIASTOLIC BLOOD PRESSURE: 80 MMHG | OXYGEN SATURATION: 95 % | HEART RATE: 68 BPM | WEIGHT: 288.4 LBS | HEIGHT: 71 IN | BODY MASS INDEX: 40.38 KG/M2

## 2025-05-06 DIAGNOSIS — I10 ESSENTIAL HYPERTENSION: Primary | ICD-10-CM

## 2025-05-06 DIAGNOSIS — N40.1 BENIGN PROSTATIC HYPERPLASIA WITH URINARY FREQUENCY: ICD-10-CM

## 2025-05-06 DIAGNOSIS — M1A.09X0 CHRONIC GOUT OF MULTIPLE SITES, UNSPECIFIED CAUSE: ICD-10-CM

## 2025-05-06 DIAGNOSIS — R73.9 HYPERGLYCEMIA: ICD-10-CM

## 2025-05-06 DIAGNOSIS — R35.0 BENIGN PROSTATIC HYPERPLASIA WITH URINARY FREQUENCY: ICD-10-CM

## 2025-05-06 DIAGNOSIS — I89.0 LYMPHEDEMA: ICD-10-CM

## 2025-05-06 DIAGNOSIS — E66.813 CLASS 3 SEVERE OBESITY DUE TO EXCESS CALORIES WITH SERIOUS COMORBIDITY AND BODY MASS INDEX (BMI) OF 40.0 TO 44.9 IN ADULT: ICD-10-CM

## 2025-05-06 DIAGNOSIS — E66.01 CLASS 3 SEVERE OBESITY DUE TO EXCESS CALORIES WITH SERIOUS COMORBIDITY AND BODY MASS INDEX (BMI) OF 40.0 TO 44.9 IN ADULT: ICD-10-CM

## 2025-05-06 DIAGNOSIS — Z00.00 MEDICARE ANNUAL WELLNESS VISIT, SUBSEQUENT: ICD-10-CM

## 2025-05-06 RX ORDER — TIRZEPATIDE 2.5 MG/.5ML
2.5 INJECTION, SOLUTION SUBCUTANEOUS WEEKLY
Qty: 2 ML | Refills: 3 | Status: SHIPPED | OUTPATIENT
Start: 2025-05-06

## 2025-05-06 NOTE — ASSESSMENT & PLAN NOTE
Orders:    Comprehensive Metabolic Panel    Lipid Panel With / Chol / HDL Ratio    TSH Rfx On Abnormal To Free T4

## 2025-05-06 NOTE — ASSESSMENT & PLAN NOTE
Patient's (Body mass index is 40.24 kg/m².) indicates that they are obese (BMI >30) with health conditions that include osteoarthritis . Weight is worsening. BMI  is above average; BMI management plan is completed. We discussed portion control and increasing exercise.     Orders:    Tirzepatide-Weight Management (Zepbound) 2.5 MG/0.5ML solution; Inject 0.5 mL under the skin into the appropriate area as directed 1 (One) Time Per Week.  Obesity. Uncontrolled.  Start zepbound 2.5 a week.

## 2025-05-06 NOTE — PROGRESS NOTES
Subjective   The ABCs of the Annual Wellness Visit  Medicare Wellness Visit      Álvaro Jacques is a 74 y.o. patient who presents for a Medicare Wellness Visit.    The following portions of the patient's history were reviewed and   updated as appropriate: allergies, current medications, past family history, past medical history, past social history, past surgical history, and problem list.    Compared to one year ago, the patient's physical   health is the same.  Compared to one year ago, the patient's mental   health is the same.    Recent Hospitalizations:  He was not admitted to the hospital during the last year.     Current Medical Providers:  Patient Care Team:  Corey Martínez MD as PCP - General (Family Medicine)    Outpatient Medications Prior to Visit   Medication Sig Dispense Refill    cetirizine (zyrTEC) 5 MG tablet Take 1 tablet by mouth Daily.      escitalopram (LEXAPRO) 10 MG tablet TAKE 1 TABLET BY MOUTH EVERY DAY 90 tablet 2    fluticasone (FLONASE) 50 MCG/ACT nasal spray 1 spray into the nostril(s) as directed by provider Daily. 16 g 5    ibuprofen (ADVIL,MOTRIN) 600 MG tablet Take 1 tablet by mouth Daily As Needed for Mild Pain.      meloxicam (MOBIC) 15 MG tablet Take 1 tablet by mouth Daily.      metoprolol tartrate (LOPRESSOR) 50 MG tablet TAKE 1 TABLET BY MOUTH 2 TIMES A  tablet 3    montelukast (SINGULAIR) 10 MG tablet TAKE 1 TABLET BY MOUTH EVERY NIGHT 90 tablet 3    pantoprazole (PROTONIX) 40 MG EC tablet TAKE 1 TABLET BY MOUTH 2 TIMES A  tablet 3    spironolactone (ALDACTONE) 50 MG tablet Take 1 tablet by mouth 2 (Two) Times a Day. 90 tablet 3    tamsulosin (FLOMAX) 0.4 MG capsule 24 hr capsule Take 1 capsule by mouth Daily. 90 capsule 3     No facility-administered medications prior to visit.     No opioid medication identified on active medication list. I have reviewed chart for other potential  high risk medication/s and harmful drug interactions in the  "elderly.      Aspirin is not on active medication list.  Aspirin use is not indicated based on review of current medical condition/s. Risk of harm outweighs potential benefits.  .    Patient Active Problem List   Diagnosis    Seasonal allergic rhinitis due to pollen    Antral gastritis    Coronel's esophagus without dysplasia    Recurrent major depressive disorder, in partial remission    Elevated liver function tests    Other emphysema    Essential hypertension    Chronic gout of multiple sites    Osteoarthritis of both knees    Primary insomnia    Benign prostatic hyperplasia with urinary frequency    Immunization deficiency    Non-intractable vomiting with nausea    Medicare annual wellness visit, subsequent    Hyperamylasemia    ELEAZAR (generalized anxiety disorder)    Bronchitis    Chronic cough    Colon cancer screening    Hyperglycemia    Clinical diagnosis of COVID-19    Localized edema    Venous insufficiency    Preoperative clearance    Obesity (BMI 30-39.9)    Encounter for hepatitis C screening test for low risk patient    Hyperhidrosis    Personal history of colonic polyps    Lymphedema    Influenza    Chronic fatigue    Class 3 severe obesity due to excess calories with serious comorbidity and body mass index (BMI) of 40.0 to 44.9 in adult     Advance Care Planning Advance Directive is not on file.  ACP discussion was held with the patient during this visit. Patient has an advance directive (not in EMR), copy requested.            Objective   Vitals:    05/06/25 0919   BP: 128/80   BP Location: Left arm   Patient Position: Sitting   Cuff Size: Large Adult   Pulse: 68   Resp: 17   Temp: 97 °F (36.1 °C)   TempSrc: Temporal   SpO2: 95%   Weight: 131 kg (288 lb 6.4 oz)   Height: 180.3 cm (70.98\")       Estimated body mass index is 40.24 kg/m² as calculated from the following:    Height as of this encounter: 180.3 cm (70.98\").    Weight as of this encounter: 131 kg (288 lb 6.4 oz).    Class 3 Severe Obesity " (BMI >=40). Obesity-related health conditions include the following: hypertension. Obesity is unchanged. BMI is is above average; BMI management plan is completed. We discussed portion control and increasing exercise.           Does the patient have evidence of cognitive impairment? No                                                                                                Health  Risk Assessment    Smoking Status:  Social History     Tobacco Use   Smoking Status Former    Current packs/day: 0.00    Types: Cigarettes, Cigars    Quit date: 1970    Years since quittin.8   Smokeless Tobacco Never   Tobacco Comments    still smokes cigars occasionally      Alcohol Consumption:  Social History     Substance and Sexual Activity   Alcohol Use Yes    Alcohol/week: 10.0 standard drinks of alcohol    Types: 10 Shots of liquor per week    Comment: per week//Caffeine use: 2 cups daily        Fall Risk Screen  STEADI Fall Risk Assessment was completed, and patient is at LOW risk for falls.Assessment completed on:2025    Depression Screening   Little interest or pleasure in doing things? Not at all   Feeling down, depressed, or hopeless? Not at all   PHQ-2 Total Score 0      Health Habits and Functional and Cognitive Screenin/29/2025     7:53 AM   Functional & Cognitive Status   Do you have difficulty preparing food and eating? No   Do you have difficulty bathing yourself, getting dressed or grooming yourself? No   Do you have difficulty using the toilet? No   Do you have difficulty moving around from place to place? No   Do you have trouble with steps or getting out of a bed or a chair? No   Current Diet Well Balanced Diet   Dental Exam Up to date   Eye Exam Not up to date   Exercise (times per week) 0 times per week   Current Exercises Include No Regular Exercise   Do you need help using the phone?  No   Are you deaf or do you have serious difficulty hearing?  No   Do you need help to go to places  out of walking distance? No   Do you need help shopping? No   Do you need help preparing meals?  No   Do you need help with housework?  No   Do you need help with laundry? No   Do you need help taking your medications? No   Do you need help managing money? No   Do you ever drive or ride in a car without wearing a seat belt? No   Have you felt unusual stress, anger or loneliness in the last month? No   Who do you live with? Spouse   If you need help, do you have trouble finding someone available to you? No   Have you been bothered in the last four weeks by sexual problems? Yes   Do you have difficulty concentrating, remembering or making decisions? No           Age-appropriate Screening Schedule:  Refer to the list below for future screening recommendations based on patient's age, sex and/or medical conditions. Orders for these recommended tests are listed in the plan section. The patient has been provided with a written plan.    Health Maintenance List  Health Maintenance   Topic Date Due    TDAP/TD VACCINES (1 - Tdap) Never done    AAA SCREEN ONCE  Never done    GASTROSCOPY (EGD)  09/01/2024    COVID-19 Vaccine (5 - 2024-25 season) 09/01/2024    INFLUENZA VACCINE  07/01/2025    ANNUAL WELLNESS VISIT  05/06/2026    COLORECTAL CANCER SCREENING  09/01/2026    HEPATITIS C SCREENING  Completed    Pneumococcal Vaccine 50+  Completed    ZOSTER VACCINE  Completed                                                                                                                                                CMS Preventative Services Quick Reference  Risk Factors Identified During Encounter  Inactivity/Sedentary: Patient was advised to exercise at least 150 minutes a week per CDC recommendations.    The above risks/problems have been discussed with the patient.  Pertinent information has been shared with the patient in the After Visit Summary.  An After Visit Summary and PPPS were made available to the patient.    Follow Up:  "  Next Medicare Wellness visit to be scheduled in 1 year.         Additional E&M Note during same encounter follows:  Patient has additional, significant, and separately identifiable condition(s)/problem(s) that require work above and beyond the Medicare Wellness Visit     Chief Complaint  Medicare Wellness-subsequent    Subjective   HPI  Bill is also being seen today for additional medical problem/s.    Review of Systems   Constitutional:  Negative for chills, diaphoresis and fatigue.   HENT:  Negative for rhinorrhea.    Respiratory:  Negative for cough and shortness of breath.    Cardiovascular:  Negative for chest pain and leg swelling.   Gastrointestinal:  Negative for abdominal distention and abdominal pain.   Musculoskeletal:  Negative for arthralgias and back pain.   Skin:  Negative for rash.      C/o trouble with lymphedema.  Increasing severity.          Objective   Vital Signs:  /80 (BP Location: Left arm, Patient Position: Sitting, Cuff Size: Large Adult)   Pulse 68   Temp 97 °F (36.1 °C) (Temporal)   Resp 17   Ht 180.3 cm (70.98\")   Wt 131 kg (288 lb 6.4 oz)   SpO2 95%   BMI 40.24 kg/m²   Physical Exam  Vitals reviewed.   Constitutional:       Appearance: He is well-developed. He is not diaphoretic.   HENT:      Head: Normocephalic and atraumatic.   Eyes:      General: No scleral icterus.     Pupils: Pupils are equal, round, and reactive to light.   Neck:      Thyroid: No thyromegaly.   Cardiovascular:      Rate and Rhythm: Normal rate and regular rhythm.      Heart sounds: No murmur heard.     No friction rub. No gallop.   Pulmonary:      Effort: Pulmonary effort is normal. No respiratory distress.      Breath sounds: No wheezing or rales.   Chest:      Chest wall: No tenderness.   Abdominal:      General: Bowel sounds are normal. There is no distension.      Palpations: Abdomen is soft.      Tenderness: There is no abdominal tenderness.   Musculoskeletal:         General: No deformity. " Normal range of motion.   Lymphadenopathy:      Cervical: No cervical adenopathy.   Skin:     General: Skin is warm and dry.      Findings: No rash.   Neurological:      Cranial Nerves: No cranial nerve deficit.      Motor: No abnormal muscle tone.                    Assessment and Plan      Essential hypertension      Orders:    Comprehensive Metabolic Panel    Lipid Panel With / Chol / HDL Ratio    TSH Rfx On Abnormal To Free T4    Lymphedema       Obesity.  Uncontrolled.  Start zepbound 2.5 weekly.    Class 3 severe obesity due to excess calories with serious comorbidity and body mass index (BMI) of 40.0 to 44.9 in adult  Patient's (Body mass index is 40.24 kg/m².) indicates that they are obese (BMI >30) with health conditions that include osteoarthritis . Weight is worsening. BMI  is above average; BMI management plan is completed. We discussed portion control and increasing exercise.     Orders:    Tirzepatide-Weight Management (Zepbound) 2.5 MG/0.5ML solution; Inject 0.5 mL under the skin into the appropriate area as directed 1 (One) Time Per Week.  Obesity. Uncontrolled.  Start zepbound 2.5 a week.    Hyperglycemia    Orders:    Hemoglobin A1c    Medicare annual wellness visit, subsequent    Orders:    CBC & Differential    Benign prostatic hyperplasia with urinary frequency    Orders:    PSA DIAGNOSTIC    Chronic gout of multiple sites, unspecified cause    Orders:    Uric Acid      Obesity.  Uncontrolled.  Start tirzepatide.   Preventive Counseling:  Encouraged to stay active.  Covid vaccine declined.   HEP A UTD.  Pneumovax UTD.  Colonoscopy UTD.  Tdap recommended.    Dentist UTD.  Recommended optho.            Follow Up   Return in about 4 months (around 9/6/2025).  Patient was given instructions and counseling regarding his condition or for health maintenance advice. Please see specific information pulled into the AVS if appropriate.

## 2025-05-07 LAB
ALBUMIN SERPL-MCNC: 3.9 G/DL (ref 3.5–5.2)
ALBUMIN/GLOB SERPL: 1.9 G/DL
ALP SERPL-CCNC: 51 U/L (ref 39–117)
ALT SERPL-CCNC: 22 U/L (ref 1–41)
AST SERPL-CCNC: 22 U/L (ref 1–40)
BASOPHILS # BLD AUTO: 0.08 10*3/MM3 (ref 0–0.2)
BASOPHILS NFR BLD AUTO: 1.4 % (ref 0–1.5)
BILIRUB SERPL-MCNC: 1.6 MG/DL (ref 0–1.2)
BUN SERPL-MCNC: 11 MG/DL (ref 8–23)
BUN/CREAT SERPL: 15.1 (ref 7–25)
CALCIUM SERPL-MCNC: 8.8 MG/DL (ref 8.6–10.5)
CHLORIDE SERPL-SCNC: 103 MMOL/L (ref 98–107)
CHOLEST SERPL-MCNC: 122 MG/DL (ref 0–200)
CHOLEST/HDLC SERPL: 1.77 {RATIO}
CO2 SERPL-SCNC: 22.5 MMOL/L (ref 22–29)
CREAT SERPL-MCNC: 0.73 MG/DL (ref 0.76–1.27)
EGFRCR SERPLBLD CKD-EPI 2021: 96.7 ML/MIN/1.73
EOSINOPHIL # BLD AUTO: 0.06 10*3/MM3 (ref 0–0.4)
EOSINOPHIL NFR BLD AUTO: 1 % (ref 0.3–6.2)
ERYTHROCYTE [DISTWIDTH] IN BLOOD BY AUTOMATED COUNT: 15.8 % (ref 12.3–15.4)
GLOBULIN SER CALC-MCNC: 2.1 GM/DL
GLUCOSE SERPL-MCNC: 106 MG/DL (ref 65–99)
HBA1C MFR BLD: 4.8 % (ref 4.8–5.6)
HCT VFR BLD AUTO: 41.8 % (ref 37.5–51)
HDLC SERPL-MCNC: 69 MG/DL (ref 40–60)
HGB BLD-MCNC: 14.8 G/DL (ref 13–17.7)
IMM GRANULOCYTES # BLD AUTO: 0.03 10*3/MM3 (ref 0–0.05)
IMM GRANULOCYTES NFR BLD AUTO: 0.5 % (ref 0–0.5)
LDLC SERPL CALC-MCNC: 39 MG/DL (ref 0–100)
LYMPHOCYTES # BLD AUTO: 1.03 10*3/MM3 (ref 0.7–3.1)
LYMPHOCYTES NFR BLD AUTO: 17.4 % (ref 19.6–45.3)
MCH RBC QN AUTO: 31.6 PG (ref 26.6–33)
MCHC RBC AUTO-ENTMCNC: 35.4 G/DL (ref 31.5–35.7)
MCV RBC AUTO: 89.1 FL (ref 79–97)
MONOCYTES # BLD AUTO: 0.44 10*3/MM3 (ref 0.1–0.9)
MONOCYTES NFR BLD AUTO: 7.4 % (ref 5–12)
NEUTROPHILS # BLD AUTO: 4.27 10*3/MM3 (ref 1.7–7)
NEUTROPHILS NFR BLD AUTO: 72.3 % (ref 42.7–76)
NRBC BLD AUTO-RTO: 0 /100 WBC (ref 0–0.2)
PLATELET # BLD AUTO: 167 10*3/MM3 (ref 140–450)
POTASSIUM SERPL-SCNC: 4 MMOL/L (ref 3.5–5.2)
PROT SERPL-MCNC: 6 G/DL (ref 6–8.5)
PSA SERPL-MCNC: 2.2 NG/ML (ref 0–4)
RBC # BLD AUTO: 4.69 10*6/MM3 (ref 4.14–5.8)
SODIUM SERPL-SCNC: 136 MMOL/L (ref 136–145)
TRIGL SERPL-MCNC: 68 MG/DL (ref 0–150)
TSH SERPL DL<=0.005 MIU/L-ACNC: 3.24 UIU/ML (ref 0.27–4.2)
URATE SERPL-MCNC: 5.4 MG/DL (ref 3.4–7)
VLDLC SERPL CALC-MCNC: 14 MG/DL (ref 5–40)
WBC # BLD AUTO: 5.91 10*3/MM3 (ref 3.4–10.8)

## 2025-06-05 DIAGNOSIS — E66.01 CLASS 3 SEVERE OBESITY DUE TO EXCESS CALORIES WITH SERIOUS COMORBIDITY AND BODY MASS INDEX (BMI) OF 40.0 TO 44.9 IN ADULT: Primary | ICD-10-CM

## 2025-06-05 DIAGNOSIS — E66.813 CLASS 3 SEVERE OBESITY DUE TO EXCESS CALORIES WITH SERIOUS COMORBIDITY AND BODY MASS INDEX (BMI) OF 40.0 TO 44.9 IN ADULT: Primary | ICD-10-CM

## 2025-06-06 ENCOUNTER — PRIOR AUTHORIZATION (OUTPATIENT)
Dept: FAMILY MEDICINE CLINIC | Facility: CLINIC | Age: 75
End: 2025-06-06
Payer: MEDICARE

## 2025-06-11 DIAGNOSIS — E66.01 CLASS 3 SEVERE OBESITY DUE TO EXCESS CALORIES WITH SERIOUS COMORBIDITY AND BODY MASS INDEX (BMI) OF 40.0 TO 44.9 IN ADULT: ICD-10-CM

## 2025-06-11 DIAGNOSIS — E66.813 CLASS 3 SEVERE OBESITY DUE TO EXCESS CALORIES WITH SERIOUS COMORBIDITY AND BODY MASS INDEX (BMI) OF 40.0 TO 44.9 IN ADULT: ICD-10-CM

## 2025-06-11 RX ORDER — TIRZEPATIDE 5 MG/.5ML
5 INJECTION, SOLUTION SUBCUTANEOUS WEEKLY
Qty: 2 ML | Refills: 5 | Status: SHIPPED | OUTPATIENT
Start: 2025-06-11

## 2025-08-30 DIAGNOSIS — I10 ESSENTIAL HYPERTENSION: ICD-10-CM

## 2025-08-30 RX ORDER — METOPROLOL TARTRATE 50 MG
50 TABLET ORAL 2 TIMES DAILY
Qty: 180 TABLET | Refills: 3 | Status: SHIPPED | OUTPATIENT
Start: 2025-08-30

## (undated) DEVICE — ADAPT CLN BIOGUARD AIR/H2O DISP

## (undated) DEVICE — TUBING, SUCTION, 1/4" X 10', STRAIGHT: Brand: MEDLINE

## (undated) DEVICE — ADAPT CLN SCPE ENDO PORPOISE BX/50 DISP

## (undated) DEVICE — CANN O2 ETCO2 FITS ALL CONN CO2 SMPL A/ 7IN DISP LF

## (undated) DEVICE — KT ORCA ORCAPOD DISP STRL

## (undated) DEVICE — GOWN ISOL W/THUMB UNIV BLU BX/15

## (undated) DEVICE — BITEBLOCK OMNI BLOC

## (undated) DEVICE — GAUZE,SPONGE,FLUFF,6"X6.75",STRL,5/TRAY: Brand: MEDLINE

## (undated) DEVICE — GOWN SURG ENDOARMOR LVL3 UNIV KNT/CUF DISP NS

## (undated) DEVICE — SYRINGE, LUER SLIP, STERILE, 60ML: Brand: MEDLINE

## (undated) DEVICE — THE SINGLE USE ETRAP – POLYP TRAP IS USED FOR SUCTION RETRIEVAL OF ENDOSCOPICALLY REMOVED POLYPS.: Brand: ETRAP

## (undated) DEVICE — THE TORRENT IRRIGATION SCOPE CONNECTOR IS USED WITH THE TORRENT IRRIGATION TUBING TO PROVIDE IRRIGATION FLUIDS SUCH AS STERILE WATER DURING GASTROINTESTINAL ENDOSCOPIC PROCEDURES WHEN USED IN CONJUNCTION WITH AN IRRIGATION PUMP (OR ELECTROSURGICAL UNIT).: Brand: TORRENT

## (undated) DEVICE — VIAL FORMLN CAP 10PCT 20ML

## (undated) DEVICE — LN SMPL CO2 SHTRM SD STREAM W/M LUER

## (undated) DEVICE — FRCP BX RADJAW4 NDL 2.8 240CM LG OG BX40

## (undated) DEVICE — FLEX ADVANTAGE 1500CC: Brand: FLEX ADVANTAGE

## (undated) DEVICE — SENSR O2 OXIMAX FNGR A/ 18IN NONSTR

## (undated) DEVICE — SINGLE-USE BIOPSY FORCEPS: Brand: RADIAL JAW 4

## (undated) DEVICE — Device

## (undated) DEVICE — MSK PROC CURAPLEX O2 2/ADAPT 7FT

## (undated) DEVICE — SNAR POLYP SENSATION STDOVL 27 240 BX40

## (undated) DEVICE — SNAR POLYP CAPTIVATOR/COLD STFF RND 10MM 240CM